# Patient Record
Sex: FEMALE | Race: WHITE | NOT HISPANIC OR LATINO | Employment: OTHER | URBAN - METROPOLITAN AREA
[De-identification: names, ages, dates, MRNs, and addresses within clinical notes are randomized per-mention and may not be internally consistent; named-entity substitution may affect disease eponyms.]

---

## 2017-01-09 ENCOUNTER — COMMUNICATION - HEALTHEAST (OUTPATIENT)
Dept: ADMINISTRATIVE | Facility: HOSPITAL | Age: 72
End: 2017-01-09

## 2017-01-18 ENCOUNTER — COMMUNICATION - HEALTHEAST (OUTPATIENT)
Dept: ADMINISTRATIVE | Facility: HOSPITAL | Age: 72
End: 2017-01-18

## 2017-01-24 ENCOUNTER — COMMUNICATION - HEALTHEAST (OUTPATIENT)
Dept: INTERNAL MEDICINE | Facility: CLINIC | Age: 72
End: 2017-01-24

## 2017-02-01 ENCOUNTER — COMMUNICATION - HEALTHEAST (OUTPATIENT)
Dept: INTERNAL MEDICINE | Facility: CLINIC | Age: 72
End: 2017-02-01

## 2017-02-03 ENCOUNTER — COMMUNICATION - HEALTHEAST (OUTPATIENT)
Dept: ADMINISTRATIVE | Facility: CLINIC | Age: 72
End: 2017-02-03

## 2017-02-03 ENCOUNTER — COMMUNICATION - HEALTHEAST (OUTPATIENT)
Dept: INTERNAL MEDICINE | Facility: CLINIC | Age: 72
End: 2017-02-03

## 2017-02-03 DIAGNOSIS — L50.9 HIVES: ICD-10-CM

## 2017-02-15 ENCOUNTER — OFFICE VISIT - HEALTHEAST (OUTPATIENT)
Dept: ALLERGY | Facility: CLINIC | Age: 72
End: 2017-02-15

## 2017-02-15 DIAGNOSIS — L50.9 URTICARIA: ICD-10-CM

## 2017-02-15 DIAGNOSIS — R09.81 NASAL CONGESTION: ICD-10-CM

## 2017-03-03 ENCOUNTER — RECORDS - HEALTHEAST (OUTPATIENT)
Dept: ADMINISTRATIVE | Facility: OTHER | Age: 72
End: 2017-03-03

## 2017-03-23 ENCOUNTER — COMMUNICATION - HEALTHEAST (OUTPATIENT)
Dept: ALLERGY | Facility: CLINIC | Age: 72
End: 2017-03-23

## 2017-04-06 ENCOUNTER — OFFICE VISIT - HEALTHEAST (OUTPATIENT)
Dept: INTERNAL MEDICINE | Facility: CLINIC | Age: 72
End: 2017-04-06

## 2017-04-06 DIAGNOSIS — H91.90 HEARING LOSS, UNSPECIFIED LATERALITY: ICD-10-CM

## 2017-04-06 DIAGNOSIS — E78.2 MIXED HYPERLIPIDEMIA: ICD-10-CM

## 2017-04-06 DIAGNOSIS — C50.411 MALIGNANT NEOPLASM OF UPPER-OUTER QUADRANT OF RIGHT FEMALE BREAST (H): ICD-10-CM

## 2017-04-06 DIAGNOSIS — Z78.0 MENOPAUSE: ICD-10-CM

## 2017-04-06 DIAGNOSIS — Z00.01 ENCOUNTER FOR GENERAL ADULT MEDICAL EXAMINATION WITH ABNORMAL FINDINGS: ICD-10-CM

## 2017-04-06 DIAGNOSIS — E55.9 VITAMIN D DEFICIENCY: ICD-10-CM

## 2017-04-06 LAB
CHOLEST SERPL-MCNC: 299 MG/DL
FASTING STATUS PATIENT QL REPORTED: YES
HDLC SERPL-MCNC: 47 MG/DL
LDLC SERPL CALC-MCNC: 191 MG/DL
TRIGL SERPL-MCNC: 305 MG/DL

## 2017-04-06 ASSESSMENT — MIFFLIN-ST. JEOR: SCORE: 1606.62

## 2017-04-21 ENCOUNTER — COMMUNICATION - HEALTHEAST (OUTPATIENT)
Dept: INTERNAL MEDICINE | Facility: CLINIC | Age: 72
End: 2017-04-21

## 2017-04-27 ENCOUNTER — OFFICE VISIT - HEALTHEAST (OUTPATIENT)
Dept: AUDIOLOGY | Facility: CLINIC | Age: 72
End: 2017-04-27

## 2017-04-27 DIAGNOSIS — H90.3 SENSORINEURAL HEARING LOSS, ASYMMETRICAL: ICD-10-CM

## 2017-05-04 ENCOUNTER — RECORDS - HEALTHEAST (OUTPATIENT)
Dept: ADMINISTRATIVE | Facility: OTHER | Age: 72
End: 2017-05-04

## 2017-09-08 ENCOUNTER — HOSPITAL ENCOUNTER (OUTPATIENT)
Dept: MAMMOGRAPHY | Facility: CLINIC | Age: 72
Discharge: HOME | End: 2017-09-08
Attending: INTERNAL MEDICINE

## 2017-09-08 DIAGNOSIS — Z12.31 VISIT FOR SCREENING MAMMOGRAM: ICD-10-CM

## 2017-10-09 ENCOUNTER — HOSPITAL ENCOUNTER (OUTPATIENT)
Dept: CT IMAGING | Facility: CLINIC | Age: 72
Setting detail: RADIATION/ONCOLOGY SERIES
Discharge: STILL ADMITTED OR ADMINISTRATIVE | End: 2017-10-09
Attending: INTERNAL MEDICINE

## 2017-10-09 DIAGNOSIS — C50.411 MALIGNANT NEOPLASM OF UPPER-OUTER QUADRANT OF RIGHT FEMALE BREAST (H): ICD-10-CM

## 2017-10-09 DIAGNOSIS — Z15.09 GENETIC PREDISPOSITION TO CANCER: ICD-10-CM

## 2017-10-16 ENCOUNTER — AMBULATORY - HEALTHEAST (OUTPATIENT)
Dept: INFUSION THERAPY | Facility: HOSPITAL | Age: 72
End: 2017-10-16

## 2017-10-16 ENCOUNTER — OFFICE VISIT - HEALTHEAST (OUTPATIENT)
Dept: ONCOLOGY | Facility: HOSPITAL | Age: 72
End: 2017-10-16

## 2017-10-16 DIAGNOSIS — C50.411 MALIGNANT NEOPLASM OF UPPER-OUTER QUADRANT OF RIGHT BREAST IN FEMALE, ESTROGEN RECEPTOR NEGATIVE (H): ICD-10-CM

## 2017-10-16 DIAGNOSIS — Z17.1 MALIGNANT NEOPLASM OF UPPER-OUTER QUADRANT OF RIGHT BREAST IN FEMALE, ESTROGEN RECEPTOR NEGATIVE (H): ICD-10-CM

## 2017-10-16 DIAGNOSIS — Z15.09 GENETIC PREDISPOSITION TO CANCER: ICD-10-CM

## 2017-10-16 DIAGNOSIS — C50.411 MALIGNANT NEOPLASM OF UPPER-OUTER QUADRANT OF RIGHT FEMALE BREAST (H): ICD-10-CM

## 2017-10-16 DIAGNOSIS — Z15.01 BRCA2 POSITIVE: ICD-10-CM

## 2017-10-16 DIAGNOSIS — D12.5 BENIGN NEOPLASM OF SIGMOID COLON: ICD-10-CM

## 2017-10-16 DIAGNOSIS — Z15.09 BRCA2 POSITIVE: ICD-10-CM

## 2017-11-17 ENCOUNTER — TRANSFERRED RECORDS (OUTPATIENT)
Dept: HEALTH INFORMATION MANAGEMENT | Facility: CLINIC | Age: 72
End: 2017-11-17

## 2017-11-17 ENCOUNTER — RECORDS - HEALTHEAST (OUTPATIENT)
Dept: ADMINISTRATIVE | Facility: OTHER | Age: 72
End: 2017-11-17

## 2017-12-13 ENCOUNTER — COMMUNICATION - HEALTHEAST (OUTPATIENT)
Dept: INTERNAL MEDICINE | Facility: CLINIC | Age: 72
End: 2017-12-13

## 2018-01-25 ENCOUNTER — RECORDS - HEALTHEAST (OUTPATIENT)
Dept: ADMINISTRATIVE | Facility: OTHER | Age: 73
End: 2018-01-25

## 2018-03-01 ENCOUNTER — OFFICE VISIT - HEALTHEAST (OUTPATIENT)
Dept: INTERNAL MEDICINE | Facility: CLINIC | Age: 73
End: 2018-03-01

## 2018-03-01 ENCOUNTER — COMMUNICATION - HEALTHEAST (OUTPATIENT)
Dept: INTERNAL MEDICINE | Facility: CLINIC | Age: 73
End: 2018-03-01

## 2018-03-01 DIAGNOSIS — E55.9 VITAMIN D DEFICIENCY: ICD-10-CM

## 2018-03-01 DIAGNOSIS — Z00.01 ENCOUNTER FOR GENERAL ADULT MEDICAL EXAMINATION WITH ABNORMAL FINDINGS: ICD-10-CM

## 2018-03-01 DIAGNOSIS — C50.411 MALIGNANT NEOPLASM OF UPPER-OUTER QUADRANT OF RIGHT BREAST IN FEMALE, ESTROGEN RECEPTOR NEGATIVE (H): ICD-10-CM

## 2018-03-01 DIAGNOSIS — R06.00 DYSPNEA, UNSPECIFIED TYPE: ICD-10-CM

## 2018-03-01 DIAGNOSIS — Z17.1 MALIGNANT NEOPLASM OF UPPER-OUTER QUADRANT OF RIGHT BREAST IN FEMALE, ESTROGEN RECEPTOR NEGATIVE (H): ICD-10-CM

## 2018-03-01 DIAGNOSIS — E78.2 MIXED HYPERLIPIDEMIA: ICD-10-CM

## 2018-03-01 DIAGNOSIS — Z78.0 MENOPAUSE: ICD-10-CM

## 2018-03-01 LAB
ALBUMIN SERPL-MCNC: 3.9 G/DL (ref 3.5–5)
ALBUMIN UR-MCNC: ABNORMAL MG/DL
ALP SERPL-CCNC: 100 U/L (ref 45–120)
ALT SERPL W P-5'-P-CCNC: 43 U/L (ref 0–45)
ANION GAP SERPL CALCULATED.3IONS-SCNC: 10 MMOL/L (ref 5–18)
APPEARANCE UR: CLEAR
AST SERPL W P-5'-P-CCNC: 28 U/L (ref 0–40)
BILIRUB DIRECT SERPL-MCNC: 0.1 MG/DL
BILIRUB SERPL-MCNC: 0.4 MG/DL (ref 0–1)
BILIRUB UR QL STRIP: NEGATIVE
BUN SERPL-MCNC: 16 MG/DL (ref 8–28)
CALCIUM SERPL-MCNC: 9.5 MG/DL (ref 8.5–10.5)
CHLORIDE BLD-SCNC: 102 MMOL/L (ref 98–107)
CHOLEST SERPL-MCNC: 250 MG/DL
CO2 SERPL-SCNC: 26 MMOL/L (ref 22–31)
COLOR UR AUTO: YELLOW
CREAT SERPL-MCNC: 0.65 MG/DL (ref 0.6–1.1)
ERYTHROCYTE [DISTWIDTH] IN BLOOD BY AUTOMATED COUNT: 11.3 % (ref 11–14.5)
FASTING STATUS PATIENT QL REPORTED: YES
GFR SERPL CREATININE-BSD FRML MDRD: >60 ML/MIN/1.73M2
GLUCOSE BLD-MCNC: 90 MG/DL (ref 70–125)
GLUCOSE UR STRIP-MCNC: NEGATIVE MG/DL
HCT VFR BLD AUTO: 45.9 % (ref 35–47)
HDLC SERPL-MCNC: 51 MG/DL
HGB BLD-MCNC: 15.5 G/DL (ref 12–16)
HGB UR QL STRIP: ABNORMAL
KETONES UR STRIP-MCNC: NEGATIVE MG/DL
LDLC SERPL CALC-MCNC: 160 MG/DL
LEUKOCYTE ESTERASE UR QL STRIP: NEGATIVE
MCH RBC QN AUTO: 30.6 PG (ref 27–34)
MCHC RBC AUTO-ENTMCNC: 33.8 G/DL (ref 32–36)
MCV RBC AUTO: 90 FL (ref 80–100)
NITRATE UR QL: NEGATIVE
PH UR STRIP: 5.5 [PH] (ref 5–8)
PLATELET # BLD AUTO: 243 THOU/UL (ref 140–440)
PMV BLD AUTO: 7.4 FL (ref 7–10)
POTASSIUM BLD-SCNC: 5 MMOL/L (ref 3.5–5)
PROT SERPL-MCNC: 7.3 G/DL (ref 6–8)
RBC # BLD AUTO: 5.08 MILL/UL (ref 3.8–5.4)
SODIUM SERPL-SCNC: 138 MMOL/L (ref 136–145)
SP GR UR STRIP: 1.02 (ref 1–1.03)
TRIGL SERPL-MCNC: 197 MG/DL
TSH SERPL DL<=0.005 MIU/L-ACNC: 4.13 UIU/ML (ref 0.3–5)
UROBILINOGEN UR STRIP-ACNC: ABNORMAL
WBC: 5.9 THOU/UL (ref 4–11)

## 2018-03-01 ASSESSMENT — MIFFLIN-ST. JEOR: SCORE: 1654.81

## 2018-03-02 LAB — 25(OH)D3 SERPL-MCNC: 38.2 NG/ML (ref 30–80)

## 2018-04-09 ENCOUNTER — OFFICE VISIT (OUTPATIENT)
Dept: PODIATRY | Facility: CLINIC | Age: 73
End: 2018-04-09
Payer: COMMERCIAL

## 2018-04-09 VITALS
SYSTOLIC BLOOD PRESSURE: 106 MMHG | DIASTOLIC BLOOD PRESSURE: 66 MMHG | HEIGHT: 66 IN | BODY MASS INDEX: 40.18 KG/M2 | WEIGHT: 250 LBS

## 2018-04-09 DIAGNOSIS — R60.0 LOWER EXTREMITY EDEMA: Primary | ICD-10-CM

## 2018-04-09 DIAGNOSIS — B35.1 ONYCHOMYCOSIS: ICD-10-CM

## 2018-04-09 PROCEDURE — 99203 OFFICE O/P NEW LOW 30 MIN: CPT | Performed by: PODIATRIST

## 2018-04-09 RX ORDER — ACETAMINOPHEN 160 MG
2000 TABLET,DISINTEGRATING ORAL
COMMUNITY

## 2018-04-09 RX ORDER — MULTIVITAMIN WITH IRON
1 TABLET ORAL DAILY
COMMUNITY

## 2018-04-09 RX ORDER — ACETAMINOPHEN 325 MG/1
325-650 TABLET ORAL PRN
COMMUNITY

## 2018-04-09 RX ORDER — FLUTICASONE PROPIONATE 50 MCG
2 SPRAY, SUSPENSION (ML) NASAL
COMMUNITY
Start: 2018-03-01 | End: 2024-04-08

## 2018-04-09 RX ORDER — ROSUVASTATIN CALCIUM 10 MG/1
20 TABLET, COATED ORAL DAILY
COMMUNITY
Start: 2018-03-01

## 2018-04-09 RX ORDER — DIAZEPAM 5 MG
5-10 TABLET ORAL
COMMUNITY
Start: 2018-03-01 | End: 2024-04-08

## 2018-04-09 NOTE — MR AVS SNAPSHOT
"              After Visit Summary   2018    Aida Alberts    MRN: [id number]           Patient Information     Date Of Birth          1945        Visit Information        Provider Department      2018 11:00 AM Sohail Ayala DPM [facility]        Today's Diagnoses     Lower extremity edema    -  1    Onychomycosis           Follow-ups after your visit        Follow-up notes from your care team     Return if symptoms worsen or fail to improve.      Who to contact     If you have questions or need follow up information about today's clinic visit or your schedule please contact [facility] directly at [phone].  Normal or non-critical lab and imaging results will be communicated to you by MyChart, letter or phone within 4 business days after the clinic has received the results. If you do not hear from us within 7 days, please contact the clinic through MyChart or phone. If you have a critical or abnormal lab result, we will notify you by phone as soon as possible.  Submit refill requests through [address] or call your pharmacy and they will forward the refill request to us. Please allow 3 business days for your refill to be completed.          Additional Information About Your Visit        MyChart Information     [address] lets you send messages to your doctor, view your test results, renew your prescriptions, schedule appointments and more. To sign up, go to [email or web address] . Click on \"Log in\" on the left side of the screen, which will take you to the Welcome page. Then click on \"Sign up Now\" on the right side of the page.     You will be asked to enter the access code listed below, as well as some personal information. Please follow the directions to create your username and password.     Your access code is: [email or web address]  Expires: 2018  1:01 PM     Your access code will  in 90 days. If you need help or a new code, please call your [facility] or " "[phone].        Care EveryWhere ID     This is your Care EveryWhere ID. This could be used by other organizations to access your [facility] medical records  [id number]        Your Vitals Were     Height BMI (Body Mass Index)                5' 6\" (1.676 m) 40.35 kg/m2           Blood Pressure from Last 3 Encounters:   04/09/18 106/66    Weight from Last 3 Encounters:   04/09/18 250 lb (113.4 kg)              Today, you had the following     No orders found for display       Primary Care Provider Fax #    Physician No Ref-Primary [phone]       No address on file        Equal Access to Services     [facility]: Hadii aad adan hadoxanao Soesequiel, waaxda luqadaha, qaybta kaalmada aderobbyyada, lilia cole aderobby styles . So [facility] [phone].    ATENCIÓN: Si habla español, tiene a chavez disposición servicios gratuitos de asistencia lingüística. Llame al [phone].    We comply with applicable federal civil rights laws and [place] laws. We do not discriminate on the basis of race, color, national origin, age, disability, sex, sexual orientation, or gender identity.            Thank you!     Thank you for choosing [facility]  for your care. Our goal is always to provide you with excellent care. Hearing back from our patients is one way we can continue to improve our services. Please take a few minutes to complete the written survey that you may receive in the mail after your visit with us. Thank you!             Your Updated Medication List - Protect others around you: Learn how to safely use, store and throw away your medicines at www.disposemymeds.org.          This list is accurate as of 4/9/18  1:01 PM.  Always use your most recent med list.                   Brand Name Dispense Instructions for use Diagnosis    acetaminophen 325 MG tablet    TYLENOL     Take 325-650 mg by mouth        diazepam 5 MG tablet    VALIUM     Take 5-10 mg by mouth        fluticasone 50 MCG/ACT spray    " FLONASE     Spray 2 sprays in nostril        magnesium 250 MG tablet      Take 1 tablet by mouth        rosuvastatin 10 MG tablet    CRESTOR     Take 10 mg by mouth        vitamin D3 2000 UNITS Caps      Take 2,000 Units by mouth

## 2018-04-09 NOTE — PROGRESS NOTES
"Foot & Ankle Surgery  April 9, 2018    CC: \"bl ankle swelling\", \"nail discoloration\"    I was asked to see Aida Alberts regarding the chief complaint by:  self    HPI:  Pt is a 72 year old female who presents with above complaint.  Bilateral issues x months.  \"no pain\".  She noticed the swelling when she was on a recent [place] vacation.  However, she has had issues long-term with bilateral lower extremity edema, left worse than right as she previously sustained a left tibial fracture.  She has compression stockings but has not used them yet.  She was also told while getting a pedicure that her nails were discolored, wondering what the issue is.     ROS:   Pos for CC.  The patient denies current nausea, vomiting, chills, fevers, belly pain, calf pain, chest pain or SOB.  Complete remainder of ROS is otherwise neg.    VITALS:    Vitals:    04/09/18 1116   BP: 106/66   Weight: 250 lb (113.4 kg)   Height: 5' 6\" (1.676 m)       PMH:  No past medical history on file.    SXHX:  No past surgical history on file.     MEDS:    Current Outpatient Prescriptions   Medication     rosuvastatin (CRESTOR) 10 MG tablet     magnesium 250 MG tablet     fluticasone (FLONASE) 50 MCG/ACT spray     diazepam (VALIUM) 5 MG tablet     Cholecalciferol (VITAMIN D3) 2000 UNITS CAPS     acetaminophen (TYLENOL) 325 MG tablet     No current facility-administered medications for this visit.        ALL:     Allergies   Allergen Reactions     Amoxicillin-Pot Clavulanate Nausea and Vomiting       FMH:  No family history on file.    SocHx:    Social History     Social History     Marital status:      Spouse name: N/A     Number of children: N/A     Years of education: N/A     Occupational History     Not on file.     Social History Main Topics     Smoking status: Former Smoker     Smokeless tobacco: Never Used     Alcohol use Not on file     Drug use: Not on file     Sexual activity: Not on file     Other Topics Concern     Not on file "     Social History Narrative     No narrative on file           EXAMINATION:  Gen:   No apparent distress  Neuro:   A&Ox3, no deficits  Psych:    Answering questions appropriately for age and situation with normal affect  Head:    NCAT  Eye:    Visual scanning without deficit  Ear:    Response to auditory stimuli wnl  Lung:    Non-labored breathing on RA noted  Abd:    NTND per patient report  Lymph:    Pitting edema bilateral lower extremity   Vasc:    Pulses palpable, CFT minimally delayed  Neuro:    Light touch sensation intact to all sensory nerve distributions without paresthesias  Derm:    Nail polish on all nails but I can visualize the underside of the R hallux nail, and it appears mycotic, dystrophic, thickened, discolored without skin pigment change.  Peeling skin medial R lower leg without venous pigment changes  MSK:    ROM, strength wnl without limitation, no pain on palpation noted.  Calf:    Neg for redness or tenderness    Assessment:  72 year old female with onychomycosis, mt R hallux; bilateral lower extremity pitting edema      Plan:  Discussed etiologies, anatomy and options  1.  Onychomycosis   -discussed cultures would be needed for definitive diagnosis  -discussed topical medication treatment plan, and less-than-ideal results  -patient declined treatment at this time    2.  Bilateral lower extremity edema  -discussed risk of blood clot being conributing facture, mt after recent trip to [place].  Patient declined scan, which is reasonable, as she has had baseline bilateral lower extremity edema in the past.  We also discussed humidity and/or salty diet can contribute to acute edema in the lower extremity  -advised compression stockings and elevation 30m TID for swelling control     Follow up:  prn or sooner with acute issues      Patient's medical history was reviewed today    Body mass index is 40.35 kg/(m^2).  Weight management plan: Patient was referred to their PCP to discuss a diet and  exercise plan.        Sohail Ayala, ARSALAN   Podiatric Foot & Ankle Surgeon  [facility]  [phone]

## 2018-04-19 ENCOUNTER — RECORDS - HEALTHEAST (OUTPATIENT)
Dept: ADMINISTRATIVE | Facility: OTHER | Age: 73
End: 2018-04-19

## 2018-04-26 ENCOUNTER — COMMUNICATION - HEALTHEAST (OUTPATIENT)
Dept: INTERNAL MEDICINE | Facility: CLINIC | Age: 73
End: 2018-04-26

## 2018-04-27 ENCOUNTER — COMMUNICATION - HEALTHEAST (OUTPATIENT)
Dept: ONCOLOGY | Facility: HOSPITAL | Age: 73
End: 2018-04-27

## 2018-04-30 ENCOUNTER — OFFICE VISIT - HEALTHEAST (OUTPATIENT)
Dept: ONCOLOGY | Facility: HOSPITAL | Age: 73
End: 2018-04-30

## 2018-04-30 DIAGNOSIS — C50.411 MALIGNANT NEOPLASM OF UPPER-OUTER QUADRANT OF RIGHT BREAST IN FEMALE, ESTROGEN RECEPTOR NEGATIVE (H): ICD-10-CM

## 2018-04-30 DIAGNOSIS — Z17.1 MALIGNANT NEOPLASM OF UPPER-OUTER QUADRANT OF RIGHT BREAST IN FEMALE, ESTROGEN RECEPTOR NEGATIVE (H): ICD-10-CM

## 2018-05-03 ENCOUNTER — COMMUNICATION - HEALTHEAST (OUTPATIENT)
Dept: ONCOLOGY | Facility: HOSPITAL | Age: 73
End: 2018-05-03

## 2018-05-09 ENCOUNTER — COMMUNICATION - HEALTHEAST (OUTPATIENT)
Dept: INTERNAL MEDICINE | Facility: CLINIC | Age: 73
End: 2018-05-09

## 2018-05-11 ENCOUNTER — RECORDS - HEALTHEAST (OUTPATIENT)
Dept: ADMINISTRATIVE | Facility: OTHER | Age: 73
End: 2018-05-11

## 2018-05-15 ENCOUNTER — COMMUNICATION - HEALTHEAST (OUTPATIENT)
Dept: ONCOLOGY | Facility: HOSPITAL | Age: 73
End: 2018-05-15

## 2018-05-16 ENCOUNTER — OFFICE VISIT - HEALTHEAST (OUTPATIENT)
Dept: ONCOLOGY | Facility: HOSPITAL | Age: 73
End: 2018-05-16

## 2018-05-16 DIAGNOSIS — Z15.01 BRCA2 POSITIVE: ICD-10-CM

## 2018-05-16 DIAGNOSIS — Z15.09 BRCA2 POSITIVE: ICD-10-CM

## 2018-05-16 DIAGNOSIS — C50.411 MALIGNANT NEOPLASM OF UPPER-OUTER QUADRANT OF RIGHT FEMALE BREAST (H): ICD-10-CM

## 2018-05-18 ENCOUNTER — RECORDS - HEALTHEAST (OUTPATIENT)
Dept: ADMINISTRATIVE | Facility: OTHER | Age: 73
End: 2018-05-18

## 2018-06-02 ENCOUNTER — OFFICE VISIT - HEALTHEAST (OUTPATIENT)
Dept: FAMILY MEDICINE | Facility: CLINIC | Age: 73
End: 2018-06-02

## 2018-06-02 DIAGNOSIS — R09.89 SINUS COMPLAINT: ICD-10-CM

## 2018-06-08 ENCOUNTER — OFFICE VISIT - HEALTHEAST (OUTPATIENT)
Dept: INTERNAL MEDICINE | Facility: CLINIC | Age: 73
End: 2018-06-08

## 2018-06-08 DIAGNOSIS — Z78.0 MENOPAUSE: ICD-10-CM

## 2018-06-08 DIAGNOSIS — Z01.818 PREOP EXAM FOR INTERNAL MEDICINE: ICD-10-CM

## 2018-06-08 LAB
ANION GAP SERPL CALCULATED.3IONS-SCNC: 12 MMOL/L (ref 5–18)
BUN SERPL-MCNC: 16 MG/DL (ref 8–28)
CALCIUM SERPL-MCNC: 9.7 MG/DL (ref 8.5–10.5)
CHLORIDE BLD-SCNC: 101 MMOL/L (ref 98–107)
CO2 SERPL-SCNC: 27 MMOL/L (ref 22–31)
CREAT SERPL-MCNC: 0.64 MG/DL (ref 0.6–1.1)
ERYTHROCYTE [DISTWIDTH] IN BLOOD BY AUTOMATED COUNT: 11.5 % (ref 11–14.5)
GFR SERPL CREATININE-BSD FRML MDRD: >60 ML/MIN/1.73M2
GLUCOSE BLD-MCNC: 74 MG/DL (ref 70–125)
HCT VFR BLD AUTO: 44.6 % (ref 35–47)
HGB BLD-MCNC: 15.3 G/DL (ref 12–16)
MCH RBC QN AUTO: 31.2 PG (ref 27–34)
MCHC RBC AUTO-ENTMCNC: 34.2 G/DL (ref 32–36)
MCV RBC AUTO: 91 FL (ref 80–100)
PLATELET # BLD AUTO: 261 THOU/UL (ref 140–440)
PMV BLD AUTO: 7.2 FL (ref 7–10)
POTASSIUM BLD-SCNC: 4.9 MMOL/L (ref 3.5–5)
RBC # BLD AUTO: 4.89 MILL/UL (ref 3.8–5.4)
SODIUM SERPL-SCNC: 140 MMOL/L (ref 136–145)
WBC: 10.1 THOU/UL (ref 4–11)

## 2018-06-08 ASSESSMENT — MIFFLIN-ST. JEOR: SCORE: 1650.28

## 2018-08-30 ENCOUNTER — COMMUNICATION - HEALTHEAST (OUTPATIENT)
Dept: INTERNAL MEDICINE | Facility: CLINIC | Age: 73
End: 2018-08-30

## 2018-09-07 ENCOUNTER — RECORDS - HEALTHEAST (OUTPATIENT)
Dept: ADMINISTRATIVE | Facility: OTHER | Age: 73
End: 2018-09-07

## 2019-04-15 ENCOUNTER — TRANSFERRED RECORDS (OUTPATIENT)
Dept: HEALTH INFORMATION MANAGEMENT | Facility: CLINIC | Age: 74
End: 2019-04-15
Payer: COMMERCIAL

## 2019-04-19 ENCOUNTER — COMMUNICATION - HEALTHEAST (OUTPATIENT)
Dept: INTERNAL MEDICINE | Facility: CLINIC | Age: 74
End: 2019-04-19

## 2019-07-22 ENCOUNTER — RECORDS - HEALTHEAST (OUTPATIENT)
Dept: ADMINISTRATIVE | Facility: OTHER | Age: 74
End: 2019-07-22

## 2019-07-23 ENCOUNTER — RECORDS - HEALTHEAST (OUTPATIENT)
Dept: ADMINISTRATIVE | Facility: OTHER | Age: 74
End: 2019-07-23

## 2019-08-13 ENCOUNTER — AMBULATORY - HEALTHEAST (OUTPATIENT)
Dept: FAMILY MEDICINE | Facility: CLINIC | Age: 74
End: 2019-08-13

## 2019-08-13 ENCOUNTER — COMMUNICATION - HEALTHEAST (OUTPATIENT)
Dept: ONCOLOGY | Facility: HOSPITAL | Age: 74
End: 2019-08-13

## 2019-08-13 DIAGNOSIS — C50.411 MALIGNANT NEOPLASM OF UPPER-OUTER QUADRANT OF RIGHT BREAST IN FEMALE, ESTROGEN RECEPTOR NEGATIVE (H): ICD-10-CM

## 2019-08-13 DIAGNOSIS — Z15.09 GENETIC PREDISPOSITION TO CANCER: ICD-10-CM

## 2019-08-13 DIAGNOSIS — Z87.891 STOPPED SMOKING WITH GREATER THAN 20 PACK YEAR HISTORY: ICD-10-CM

## 2019-08-13 DIAGNOSIS — Z17.1 MALIGNANT NEOPLASM OF UPPER-OUTER QUADRANT OF RIGHT BREAST IN FEMALE, ESTROGEN RECEPTOR NEGATIVE (H): ICD-10-CM

## 2019-08-29 ENCOUNTER — HOSPITAL ENCOUNTER (OUTPATIENT)
Dept: CT IMAGING | Facility: CLINIC | Age: 74
Discharge: HOME | End: 2019-08-29
Attending: INTERNAL MEDICINE

## 2019-08-29 DIAGNOSIS — C50.411 MALIGNANT NEOPLASM OF UPPER-OUTER QUADRANT OF RIGHT BREAST IN FEMALE, ESTROGEN RECEPTOR NEGATIVE (H): ICD-10-CM

## 2019-08-29 DIAGNOSIS — Z87.891 STOPPED SMOKING WITH GREATER THAN 20 PACK YEAR HISTORY: ICD-10-CM

## 2019-08-29 DIAGNOSIS — Z17.1 MALIGNANT NEOPLASM OF UPPER-OUTER QUADRANT OF RIGHT BREAST IN FEMALE, ESTROGEN RECEPTOR NEGATIVE (H): ICD-10-CM

## 2019-08-29 DIAGNOSIS — Z15.09 GENETIC PREDISPOSITION TO CANCER: ICD-10-CM

## 2019-10-07 ENCOUNTER — HOSPITAL ENCOUNTER (OUTPATIENT)
Dept: MAMMOGRAPHY | Facility: CLINIC | Age: 74
Discharge: HOME | End: 2019-10-07
Attending: INTERNAL MEDICINE

## 2019-10-07 DIAGNOSIS — Z12.31 SCREENING MAMMOGRAM, ENCOUNTER FOR: ICD-10-CM

## 2019-10-08 ENCOUNTER — RECORDS - HEALTHEAST (OUTPATIENT)
Dept: ADMINISTRATIVE | Facility: OTHER | Age: 74
End: 2019-10-08

## 2019-10-08 ENCOUNTER — RECORDS - HEALTHEAST (OUTPATIENT)
Dept: RADIOLOGY | Facility: CLINIC | Age: 74
End: 2019-10-08

## 2019-10-10 ENCOUNTER — AMBULATORY - HEALTHEAST (OUTPATIENT)
Dept: ONCOLOGY | Facility: HOSPITAL | Age: 74
End: 2019-10-10

## 2019-10-10 ENCOUNTER — OFFICE VISIT - HEALTHEAST (OUTPATIENT)
Dept: ONCOLOGY | Facility: HOSPITAL | Age: 74
End: 2019-10-10

## 2019-10-10 DIAGNOSIS — C50.411 MALIGNANT NEOPLASM OF UPPER-OUTER QUADRANT OF RIGHT BREAST IN FEMALE, ESTROGEN RECEPTOR NEGATIVE (H): ICD-10-CM

## 2019-10-10 DIAGNOSIS — Z17.1 MALIGNANT NEOPLASM OF UPPER-OUTER QUADRANT OF RIGHT BREAST IN FEMALE, ESTROGEN RECEPTOR NEGATIVE (H): ICD-10-CM

## 2019-10-10 DIAGNOSIS — Z12.31 ENCOUNTER FOR SCREENING MAMMOGRAM FOR MALIGNANT NEOPLASM OF BREAST: ICD-10-CM

## 2019-10-11 ENCOUNTER — AMBULATORY - HEALTHEAST (OUTPATIENT)
Dept: OTHER | Facility: CLINIC | Age: 74
End: 2019-10-11

## 2019-10-11 DIAGNOSIS — Z17.1 ESTROGEN RECEPTOR NEGATIVE STATUS (ER-): ICD-10-CM

## 2019-10-11 DIAGNOSIS — C50.411 MALIGNANT NEOPLASM OF UPPER-OUTER QUADRANT OF RIGHT FEMALE BREAST (H): ICD-10-CM

## 2019-10-17 NOTE — TELEPHONE ENCOUNTER
ONCOLOGY INTAKE: Records Information      APPT INFORMATION:  Referring provider:  Va Marinelli  Referring provider s clinic:  [facility]   Reason for visit/diagnosis:  BRCA2+, personal history of breast cancer, family history of ovarian cancer  Has patient been notified of appointment date and time?: yes    RECORDS INFORMATION:  Were the records received with the referral (via [employer])? yes    Has patient been seen for any external appt for this diagnosis? yes    If yes, where? [facility]     Has patient had any imaging or procedures outside of [place] for this condition? yes      If Yes, where? [facility] - labs for genetics and imaging for breast cancer    ADDITIONAL INFORMATION:  Referral sent to [facility]

## 2019-10-18 NOTE — TELEPHONE ENCOUNTER
RECORDS STATUS - BREAST    RECORDS REQUESTED FROM: Epic/   DATE REQUESTED: 11/15/2019    NOTES DETAILS STATUS   OFFICE NOTE from referring provider Shanita Marinelli   OFFICE NOTE from medical oncologist [facility]   OFFICE NOTE from surgeon N/A Robotic Assisted Hysterectomy 2/23/2016    OFFICE NOTE from radiation oncologist     OPERATIVE REPORT     LABS     PATHOLOGY REPORTS  (Tissue diagnosis, Stage, ER/CA percentage positive and intensity of staining, HER2 IHC, FISH, and all biopsies from breast and any distant metastasis)                 Complete Bx slides Case [id number] [facility] 2/24/2016 Report in CE    Bx Slides [facility] Breast path Report in CE     GENONOMIC TESTING     TYPE:   (Next Generation Sequencing, including Foundation One testing, and Oncotype score)     IMAGING (NEED IMAGES & REPORT)     CT SCANS     MRI Complete PACS   MAMMO Complete  [facility]    ULTRASOUND Complete PACS   PET     BONE SCAN     BRAIN MRI       Action    Action Taken 10/18/2019 3pm     I faxed MR (Genetic results/notes from [facility]) to HIM    I called [facility] to have some IMG pushed to PACS  IMG dept Ph: [phone]

## 2019-10-30 ENCOUNTER — AMBULATORY - HEALTHEAST (OUTPATIENT)
Dept: OTHER | Facility: CLINIC | Age: 74
End: 2019-10-30

## 2019-10-31 ENCOUNTER — HEALTH MAINTENANCE LETTER (OUTPATIENT)
Age: 74
End: 2019-10-31

## 2019-11-13 ENCOUNTER — AMBULATORY - HEALTHEAST (OUTPATIENT)
Dept: OTHER | Facility: CLINIC | Age: 74
End: 2019-11-13

## 2019-11-15 ENCOUNTER — PRE VISIT (OUTPATIENT)
Dept: ONCOLOGY | Facility: CLINIC | Age: 74
End: 2019-11-15

## 2019-12-02 ENCOUNTER — AMBULATORY - HEALTHEAST (OUTPATIENT)
Dept: OTHER | Facility: CLINIC | Age: 74
End: 2019-12-02

## 2019-12-16 ENCOUNTER — HEALTH MAINTENANCE LETTER (OUTPATIENT)
Age: 74
End: 2019-12-16

## 2020-01-02 NOTE — PROGRESS NOTES
Oncology Risk Management Consultation:  Date on this visit: 1/3/2020    Aida Alberts  is referred by Laz Hernández MD for an oncology risk management consultation. She requires high risk screening and surveillance to reduce  her risk of cancer secondary to having a deleterious BRCA2 mutation and is considered to be at high risk for hereditary breast and ovarian cancer.    Primary Physician: Laz Hernández MD    History Of Present Illness:  Ms. Alberts is a very pleasant, healthy 74 year old female who presents with family history of breast and ovarian cancer and a personal diagnosis of a BRCA2 mutation.     Genetic Testin2015 - Positive for a deleterious BRCA2+, deletions of exons 5-7, which is the same mutation identified in her sister who was diagnosed with stage IV ovarian cancer. The testing was done using a single site genetic test from [facility].     Pertinent history:  Menarche (age unknown)  Nulliparous.  Menopause in 40s.  Breast density: Heterogeneously dense.  History of triple negative breast cancer diagnosed in  located on the right side measuring 1.6 cm in size presenting as a mammogram otherwise completely asymptomatic for which she had lumpectomy and sentinel lymph node biopsy. [place] lymph node biopsy did show some positive cells on immunohistochemistry in her lymph node. Subsequent to that she underwent adjuvant chemotherapy with a dose dense [place] and cyclophosphamide for 4 cycles. Then she went on to have radiation therapy. Then at [facility] they discovered that the lymph node might have been positive so she was given adjuvant Taxol therapy as well.    2016 [facility] pathology showed benign endometrial polyp (2.7 cm), benign submucosal, intramural and subserosal leiomyomata (6; up to 0.9 cm), deep adenomyosis, endometriosis, uterine serosa, bilateral ovaries and fallopian tubes with no evidence of dysplasia or malignancy.  Mild chronic cervicitis.  No evidence  of cervical dysplasia.    History of OCPs: No  History of HRT: No    Pertinent screening history:  5/15/2000- Mammogram  5/18/2000 - bilateral breast ultrasound  6/5/2002- Mammogram -  6/20/2003- Mammogram  1/31/2013 - Breast imaging ([facility]?)  1/2/2015 - Screening mammogram, BiRads0 for 1.2 cm partially circumscribed mass in upper outer left breast at 2:00.   1/9/2015 - Left breast US, BiRads3, benign cyst and probable cluster of benign microcysts at 2:00 within zone 2-3  5/28/2015 - Left breast US, BiRads3, Cluster of probable benign microcysts 2:00 zone 3, located approximately 7 cm from the nipple, have decreased slightly in size.  6/25/2015 - Mammogram ([facility])  7/24/2015 - Breast MRI, BiRads2.  2/19/2016 - Mammogram ([facility])  2/19/2016 - Left breast US and Diagnostic tomosynthesis mammogram both, BiRads 0 for 2:00 position zone 2 demonstrates a hypoechoic mass 1.5 x 0.7 cm. This most likely represents a cyst. internal echoes within the mass.   2/22/2016 - Breast MRI, BiRads2, likely benign cyst in left breast.  8/22/2016 - [facility]  8/25/2016 - Screening mammogram, BiRads2  3/6/2017 -Breast MRI, BiRads2.  9/8/2017 - Screening mammogram, BiRads2  4/20/2018 - Breast MRI, BiRads4,  LEFT BREAST: Regional area of clumped retroareolar plane enhancement 4.2 x 1.2 cm for which MR-guided biopsy is recommended as this is indeterminate.  5/11/2018 - Left breast biopsy, Pathology reveals fibrocystic change.  10/7/2019 - Screening mammogram, BiRads2.        Patient reports history of multiple adenomas, no records available for:  5/8/2013 - Diagnostic colonoscopy  6/26/2015 - Colonoscopy  10/17/2016 - Colonoscopy  11/17/2017 - Colonoscopy    At this visit, she denies new fatigue, breast pain, asymmetry, lumps, masses, thickening, nipple discharge and skin changes in her breasts.  She denies urinary urgency and frequency, abdominal pain, bloating, constipation, unusual vaginal discharge and early satiety.    Past Medical/Surgical  History:  Past Medical History:   Diagnosis Date     BRCA2 positive 01/22/2015    deletion of exons 5-7, identified using single site testing through [email or web address]     Colonic adenoma     5-6 advanced adenomas in 2013 colonoscopy     Malignant neoplasm of upper-outer quadrant of right breast in female, estrogen receptor negative ([facility]) 2002    infiltrating ductal carcinoma w/DCIS, Stage IIA (T1c, N1, cM0, Free text: ER-ve,GA-ve,Hds4wmb -ve)     No past surgical history on file.    Allergies:  Allergies as of 01/03/2020 - Reviewed 04/09/2018   Allergen Reaction Noted     Amoxicillin-pot clavulanate Nausea and Vomiting 02/15/2016       Current Medications:  Current Outpatient Medications   Medication Sig Dispense Refill     acetaminophen (TYLENOL) 325 MG tablet Take 325-650 mg by mouth       Cholecalciferol (VITAMIN D3) 2000 UNITS CAPS Take 2,000 Units by mouth       diazepam (VALIUM) 5 MG tablet Take 5-10 mg by mouth       fluticasone (FLONASE) 50 MCG/ACT spray Spray 2 sprays in nostril       magnesium 250 MG tablet Take 1 tablet by mouth       rosuvastatin (CRESTOR) 10 MG tablet Take 10 mg by mouth          Family History:  Family History   Problem Relation Age of Onset     Hereditary Breast and Ovarian Cancer Syndrome Sister         BRCA2+, same mutation     Ovarian Cancer Sister 77        endometriod adenocarcinoma, grade2     Breast Cancer Niece 40     Melanoma Niece 29     Cancer Paternal Uncle         intestinal or renal cell        Social History:  Social History     Socioeconomic History     Marital status:      Spouse name: Not on file     Number of children: Not on file     Years of education: Not on file     Highest education level: Not on file   Occupational History     Not on file   Social Needs     Financial resource strain: Not on file     Food insecurity:     Worry: Not on file     Inability: Not on file     Transportation needs:     Medical: Not on file     Non-medical: Not on file   Tobacco  Use     Smoking status: Former Smoker     Smokeless tobacco: Never Used   Substance and Sexual Activity     Alcohol use: Not on file     Drug use: Not on file     Sexual activity: Not on file   Lifestyle     Physical activity:     Days per week: Not on file     Minutes per session: Not on file     Stress: Not on file   Relationships     Social connections:     Talks on phone: Not on file     Gets together: Not on file     Attends Church service: Not on file     Active member of club or organization: Not on file     Attends meetings of clubs or organizations: Not on file     Relationship status: Not on file     Intimate partner violence:     Fear of current or ex partner: Not on file     Emotionally abused: Not on file     Physically abused: Not on file     Forced sexual activity: Not on file   Other Topics Concern     Not on file   Social History Narrative     Not on file       Physical Exam:  There were no vitals taken for this visit.  Physical exam deferred.    Laboratory/Imaging Studies  No results found for any visits on 01/03/20.    ASSESSMENT  We discussed the differences between cancer risk for the general population and those with BRCA mutations. It is estimated that 10% of all women with breast cancer have BRCA mutations.  It is estimated that 15% of all women with ovarian cancer have BRCA mutations. We also discussed that inheriting a mutation does not mean that a person will develop cancer, but rather that they are at increased risk.     Women with BRCA2 mutations have an estimated 45-49% cumulative risk of breast cancer by age 70, compared to the general population risk of 12%.  The risk of developing contralateral breast cancer within BRCA2 carriers  is higher when the age at first diagnosis is less than age 40. It is estimated that in women whose breast cancer have a 34.6% risk for contralateral breast cancer within 10- years of their initial diagnosis (with no intervention.)    The risk for ovarian  cancer, including primary peritoneal and fallopian tube cancer, by age 70 is estimated to be between 11-18%. Male BRCA2+ carriers have a cumulative breast cancer risk of more than 6% by age 70. Both men and women have an increased risk of pancreatic cancer,  Melanoma, gall bladder, bile duct and stomach cancer compared to the general population, although the exact risks have not been well defined.     We reviewed the All Syndromes Known to Man calculator and looked at her remaining lifetime risk for contralateral breast cancer, which does not take into account her breast cancer genomic composition but only looks at her BRCA2+ status; her remaining risk for recurrence by this calculator is about 13.7% within the next 5 years and up to 33% to age 85. Her ovarian cancer risk may be about 2.3-5%.  We discussed that she should continue to have gynecological manual exams as long as she is sexually active, which she is not at this time, and that I would not recommend pelvic imaging or  tests for her for screening due to their lack of efficacy. I would recommend annual skin exams (already underway with Dr. Billingsley) and annual eye exams, already underway, as she is at heightened risk for melanoma. I would not recommend pancreatic screening for her at this time as there is no pancreatic cancer in her family; we discuss losing weight, and limiting alcohol as prevention strategies for pancreatic cancer.    We discussed signs and symptoms to be watchful for in between surveillance visits. She verbalized understanding of signs and symptoms to address with her health care providers  including vaginal bleeding, unresolved bloating, pain, tenderness, early satiety and lumps, thickening, swelling, tenderness, nipple discharge or changes in skin of breasts.    We also discussed following  a healthy lifestyle plan recommended by both NCCN and the American Cancer Society that can reduce the risk of cancer:  1 Limit alcohol  consumption to less than 1 drink per day (1 drink=5 oz.wine, 12 oz. Beer or 1.5 oz. 80-proof liquor). She may cut back on her alcohol intake but takes in about 1-2 drinks when she does drink.   2. Exercise per American Cancer Society guidelines of at least 150 minutes of moderate-intensity activity or 75 minutes of vigorous activity each week. (Or a combination of both.) Exercise should be spread  out over the week. She is planning to add a walking regimen with the [email or web address] program this year.   3. Maintain a healthy weight with a Body Mass Index between 19-24.9. She recently lost 26 pounds using [employer] and going to continue the program this year.  4. Do not use tobacco products and limit exposure to passive smoke. She does not smoke.     INDIVIDUALIZED CANCER RISK MANAGEMENT PLAN:  Individualized Surveillance Plan for women  Hereditary Breast and/or Ovarian Cancer Syndrome   Per NCCN Guidelines Version 3.2019   Recommended screening Test or procedure Last done Next Scheduled    Breast self awareness- starting at age 18. Women should be familiar with their breasts and promptly report changes to their care provider. Periodic, consistent self exam may facilitate breast self awareness. Premenopausal women may find self exams to be most informative when performed at the end of menses.   1/3//2019   October 2020   Breast screening, starting at age 25 Clinical breast exams every 6 -12 months Deferred October 2020   Breast screening   Age 25-29 Annual breast MRI screening with contrast (or mammogram if MRI is unavailable) or individualized based on family history if a breast cancer diagnosis before age 30 is present.       Breast MRI is performed preferably on day 7-15 of menstrual cycle for premenopausal women.     See below     See below   Breast screening   Age >30-75 years     Annual mammogram (consider tomosynthesis mammogram) and annual screening MRI.     Breast MRI is performed preferably on day 7-15  of menstrual cycle for premenopausal women.    Age>75 years, management should be considered on an individual basis. 10/7/2019- SCREENING MAMMOGRAM Findings: postsurgical lumpectomy changes in the right breast. No evidence for malignancy and no change from the previous exam(s). Continue routine screening mammogram as recommended. ACR BI-RADS Category 2: Benign Findings   Breast MRI in April    Exam  in October 2020 followed by tomosynthesis mammogram   Ovarian cancer screening, starting at age 30-35 Consider transvaginal ultrasound and  tests. NA- THBSO in 2016 for risk reduction    Recommendation: risk-reducing salpingo-oophorectomy(RRSO), typically between 35 and 40 years old and  upon completion of child bearing.     Because ovarian cancer onset in patients with BRCA2 mutations is on average of 8-10 years later than in patients with BRCA1 mutations, it is reasonable to delay RRSO until 40-45 years in patients with BRCA2 mutations  unless age at diagnosis in the family warrants earlier age for consideration for prophylactic surgery.    Limited data suggest that there may be a slightly increased risk of serous uterine cancer among women with BRCA1+ pathogenic variants. The provider and the patient should discuss the risks and benefits of a concurrent hysterectomy at the time of RRSO for women with a BRCA1+ pathogenic variant /like pathogenic variant prior to surgery.     Salpingectomy alone is not the standard of care for risk reduction although clinical trials are ongoing.     Discuss option of risk-reducing mastectomy.    Consider risks and benefits of risk reducing agents, such as tamoxifen and raloxifene for breast and ovarian cancer.    Consider a full body skin and eye exam for melanoma screening for both BRCA1+ and BRCA2+.     NOTE: Women with BRCA mutation who are treated for breast cancer should have screening of the remaining breast tissue with annual mammography and breast MRI.    The International  Cancer of the Pancreas Screening (CAPS) Consortium recommends that individuals with a BRCA2 mutation who have at least one first-degree relative with pancreatic cancer should undergo initial screening with endoscopic ultrasonography and/or MRI/magnetic resonance cholangiopancreatography.       I will be happy to work with her to manage her cancer risk, will follow up on her screenings and see her in the Cancer Risk Management clinic in October.    I spent 60  minutes with the patient with greater that 50% of it in counseling and coordinating care as documented above.    Estrella Her, APRN-CNS, OCN, AGN-BC  Clinical Nurse Specialist  Cancer Risk Management Program  [facility] [address]  [place] [address]    phone:  [phone]  Pager: [phone]  fax: [phone]    CC: MD Demetrice Sylvester MD Joshua Colton, MD

## 2020-01-03 ENCOUNTER — ONCOLOGY VISIT (OUTPATIENT)
Dept: ONCOLOGY | Facility: CLINIC | Age: 75
End: 2020-01-03
Attending: CLINICAL NURSE SPECIALIST
Payer: COMMERCIAL

## 2020-01-03 VITALS
TEMPERATURE: 97.7 F | RESPIRATION RATE: 16 BRPM | SYSTOLIC BLOOD PRESSURE: 116 MMHG | WEIGHT: 234.9 LBS | HEART RATE: 78 BPM | OXYGEN SATURATION: 98 % | BODY MASS INDEX: 39.14 KG/M2 | HEIGHT: 65 IN | DIASTOLIC BLOOD PRESSURE: 70 MMHG

## 2020-01-03 DIAGNOSIS — Z15.01 BRCA2 POSITIVE: Primary | ICD-10-CM

## 2020-01-03 DIAGNOSIS — Z15.09 BRCA2 POSITIVE: Primary | ICD-10-CM

## 2020-01-03 DIAGNOSIS — Z85.3 HISTORY OF MALIGNANT NEOPLASM OF BREAST: ICD-10-CM

## 2020-01-03 PROBLEM — M12.9 ARTHROPATHY: Status: ACTIVE | Noted: 2020-01-03

## 2020-01-03 PROBLEM — E78.5 HYPERLIPIDEMIA: Status: ACTIVE | Noted: 2020-01-03

## 2020-01-03 PROBLEM — Z17.1 MALIGNANT NEOPLASM OF UPPER-OUTER QUADRANT OF RIGHT BREAST IN FEMALE, ESTROGEN RECEPTOR NEGATIVE (H): Status: ACTIVE | Noted: 2020-01-03

## 2020-01-03 PROBLEM — M17.0 BILATERAL PRIMARY OSTEOARTHRITIS OF KNEE: Status: ACTIVE | Noted: 2019-04-08

## 2020-01-03 PROBLEM — Z86.0100 HISTORY OF COLON POLYPS: Status: ACTIVE | Noted: 2019-04-08

## 2020-01-03 PROBLEM — C50.411 MALIGNANT NEOPLASM OF UPPER-OUTER QUADRANT OF RIGHT BREAST IN FEMALE, ESTROGEN RECEPTOR NEGATIVE (H): Status: ACTIVE | Noted: 2020-01-03

## 2020-01-03 PROBLEM — D12.6 BENIGN NEOPLASM OF COLON: Status: ACTIVE | Noted: 2019-04-08

## 2020-01-03 PROCEDURE — 99215 OFFICE O/P EST HI 40 MIN: CPT | Mod: ZP | Performed by: CLINICAL NURSE SPECIALIST

## 2020-01-03 PROCEDURE — G0463 HOSPITAL OUTPT CLINIC VISIT: HCPCS | Mod: ZF

## 2020-01-03 ASSESSMENT — PAIN SCALES - GENERAL: PAINLEVEL: NO PAIN (0)

## 2020-01-03 ASSESSMENT — MIFFLIN-ST. JEOR: SCORE: 1568.87

## 2020-01-03 NOTE — LETTER
Cancer Risk Management  Program Locations    [facility]  [facility]  [facility]  [facility]  Mailing Address  Cancer Risk Management Program  [facility]  [address]  [place] [address]    New patient appointments  [phone]  January 3, 2020    Aida Alberts  [address] [facility] [address]  [facility] [address]      Dear Aida,    It was a pleasure meeting with you today.  Below is a copy of my note from our visit, outlining your surveillance plan.      I look forward to seeing you in the future to coordinate your care and reduce your health risks. Please feel free to contact me if you have any questions or concerns.      Oncology Risk Management Consultation:  Date on this visit: 1/3/2020    Aida Alberts  is referred by Laz Hernández MD for an oncology risk management consultation. She requires high risk screening and surveillance to reduce  her risk of cancer secondary to having a deleterious BRCA2 mutation and is considered to be at high risk for hereditary breast and ovarian cancer.    Primary Physician: Laz Hernández MD    History Of Present Illness:  Ms. Alberts is a very pleasant, healthy 74 year old female who presents with family history of breast and ovarian cancer and a personal diagnosis of a BRCA2 mutation.     Genetic Testin2015 - Positive for a deleterious BRCA2+, deletions of exons 5-7, which is the same mutation identified in her sister who was diagnosed with stage IV ovarian cancer. The testing was done using a single site genetic test from [employer].     Pertinent history:  Menarche (age unknown)  Nulliparous.  Menopause in 40s.  Breast density: Heterogeneously dense.  History of triple negative breast cancer diagnosed in  located on the right side measuring 1.6 cm in size presenting as  a mammogram otherwise completely asymptomatic for which she had lumpectomy and sentinel lymph node biopsy. [place] lymph node biopsy did show some positive cells on immunohistochemistry in her lymph node. Subsequent to that she underwent adjuvant chemotherapy with a dose dense [facility] and cyclophosphamide for 4 cycles. Then she went on to have radiation therapy. Then at [facility] they discovered that the lymph node might have been positive so she was given adjuvant Taxol therapy as well.    2/23/2016 [facility] pathology showed benign endometrial polyp (2.7 cm), benign submucosal, intramural and subserosal leiomyomata (6; up to 0.9 cm), deep adenomyosis, endometriosis, uterine serosa, bilateral ovaries and fallopian tubes with no evidence of dysplasia or malignancy.  Mild chronic cervicitis.  No evidence of cervical dysplasia.    History of OCPs: No  History of HRT: No    Pertinent screening history:  5/15/2000- Mammogram  5/18/2000 - bilateral breast ultrasound  6/5/2002- Mammogram -  6/20/2003- Mammogram  1/31/2013 - Breast imaging ([facility]?)  1/2/2015 - Screening mammogram, BiRads0 for 1.2 cm partially circumscribed mass in upper outer left breast at 2:00.   1/9/2015 - Left breast US, BiRads3, benign cyst and probable cluster of benign microcysts at 2:00 within zone 2-3  5/28/2015 - Left breast US, BiRads3, Cluster of probable benign microcysts 2:00 zone 3, located approximately 7 cm from the nipple, have decreased slightly in size.  6/25/2015 - Mammogram ([facility])  7/24/2015 - Breast MRI, BiRads2.  2/19/2016 - Mammogram ([facility])  2/19/2016 - Left breast US and Diagnostic tomosynthesis mammogram both, BiRads 0 for 2:00 position zone 2 demonstrates a hypoechoic mass 1.5 x 0.7 cm. This most likely represents a cyst. internal echoes within the mass.   2/22/2016 - Breast MRI, BiRads2, likely benign cyst in left breast.  8/22/2016 - [facility]  8/25/2016 - Screening mammogram, BiRads2  3/6/2017 -Breast MRI, BiRads2.  9/8/2017 - Screening  mammogram, BiRads2  4/20/2018 - Breast MRI, BiRads4,  LEFT BREAST: Regional area of clumped retroareolar plane enhancement 4.2 x 1.2 cm for which MR-guided biopsy is recommended as this is indeterminate.  5/11/2018 - Left breast biopsy, Pathology reveals fibrocystic change.  10/7/2019 - Screening mammogram, BiRads2.        Patient reports history of multiple adenomas, no records available for:  5/8/2013 - Diagnostic colonoscopy  6/26/2015 - Colonoscopy  10/17/2016 - Colonoscopy  11/17/2017 - Colonoscopy    At this visit, she denies new fatigue, breast pain, asymmetry, lumps, masses, thickening, nipple discharge and skin changes in her breasts.  She denies urinary urgency and frequency, abdominal pain, bloating, constipation, unusual vaginal discharge and early satiety.    Past Medical/Surgical History:  Past Medical History:   Diagnosis Date     BRCA2 positive 01/22/2015    deletion of exons 5-7, identified using single site testing through [facility]     Colonic adenoma     5-6 advanced adenomas in 2013 colonoscopy     Malignant neoplasm of upper-outer quadrant of right breast in female, estrogen receptor negative ([facility]) 2002    infiltrating ductal carcinoma w/DCIS, Stage IIA (T1c, N1, cM0, Free text: ER-ve,AK-ve,Eti6ali -ve)     No past surgical history on file.    Allergies:  Allergies as of 01/03/2020 - Reviewed 04/09/2018   Allergen Reaction Noted     Amoxicillin-pot clavulanate Nausea and Vomiting 02/15/2016       Current Medications:  Current Outpatient Medications   Medication Sig Dispense Refill     acetaminophen (TYLENOL) 325 MG tablet Take 325-650 mg by mouth       Cholecalciferol (VITAMIN D3) 2000 UNITS CAPS Take 2,000 Units by mouth       diazepam (VALIUM) 5 MG tablet Take 5-10 mg by mouth       fluticasone (FLONASE) 50 MCG/ACT spray Spray 2 sprays in nostril       magnesium 250 MG tablet Take 1 tablet by mouth       rosuvastatin (CRESTOR) 10 MG tablet Take 10 mg by mouth          Family  History:  Family History   Problem Relation Age of Onset     Hereditary Breast and Ovarian Cancer Syndrome Sister         BRCA2+, same mutation     Ovarian Cancer Sister 77        endometriod adenocarcinoma, grade2     Breast Cancer Niece 40     Melanoma Niece 29     Cancer Paternal Uncle         intestinal or renal cell        Social History:  Social History     Socioeconomic History     Marital status:      Spouse name: Not on file     Number of children: Not on file     Years of education: Not on file     Highest education level: Not on file   Occupational History     Not on file   Social Needs     Financial resource strain: Not on file     Food insecurity:     Worry: Not on file     Inability: Not on file     Transportation needs:     Medical: Not on file     Non-medical: Not on file   Tobacco Use     Smoking status: Former Smoker     Smokeless tobacco: Never Used   Substance and Sexual Activity     Alcohol use: Not on file     Drug use: Not on file     Sexual activity: Not on file   Lifestyle     Physical activity:     Days per week: Not on file     Minutes per session: Not on file     Stress: Not on file   Relationships     Social connections:     Talks on phone: Not on file     Gets together: Not on file     Attends Mu-ism service: Not on file     Active member of club or organization: Not on file     Attends meetings of clubs or organizations: Not on file     Relationship status: Not on file     Intimate partner violence:     Fear of current or ex partner: Not on file     Emotionally abused: Not on file     Physically abused: Not on file     Forced sexual activity: Not on file   Other Topics Concern     Not on file   Social History Narrative     Not on file       Physical Exam:  There were no vitals taken for this visit.  Physical exam deferred.    Laboratory/Imaging Studies  No results found for any visits on 01/03/20.    ASSESSMENT  We discussed the differences between cancer risk for the  general population and those with BRCA mutations. It is estimated that 10% of all women with breast cancer have BRCA mutations.  It is estimated that 15% of all women with ovarian cancer have BRCA mutations. We also discussed that inheriting a mutation does not mean that a person will develop cancer, but rather that they are at increased risk.     Women with BRCA2 mutations have an estimated 45-49% cumulative risk of breast cancer by age 70, compared to the general population risk of 12%.  The risk of developing contralateral breast cancer within BRCA2 carriers  is higher when the age at first diagnosis is less than age 40. It is estimated that in women whose breast cancer have a 34.6% risk for contralateral breast cancer within 10- years of their initial diagnosis (with no intervention.)    The risk for ovarian cancer, including primary peritoneal and fallopian tube cancer, by age 70 is estimated to be between 11-18%. Male BRCA2+ carriers have a cumulative breast cancer risk of more than 6% by age 70. Both men and women have an increased risk of pancreatic cancer,  Melanoma, gall bladder, bile duct and stomach cancer compared to the general population, although the exact risks have not been well defined.     We reviewed the All Syndromes Known to Man calculator and looked at her remaining lifetime risk for contralateral breast cancer, which does not take into account her breast cancer genomic composition but only looks at her BRCA2+ status; her remaining risk for recurrence by this calculator is about 13.7% within the next 5 years and up to 33% to age 85. Her ovarian cancer risk may be about 2.3-5%.  We discussed that she should continue to have gynecological manual exams as long as she is sexually active, which she is not at this time, and that I would not recommend pelvic imaging or  tests for her for screening due to their lack of efficacy. I would recommend annual skin exams (already underway with   [facility]) and annual eye exams, already underway, as she is at heightened risk for melanoma. I would not recommend pancreatic screening for her at this time as there is no pancreatic cancer in her family; we discuss losing weight, and limiting alcohol as prevention strategies for pancreatic cancer.    We discussed signs and symptoms to be watchful for in between surveillance visits. She verbalized understanding of signs and symptoms to address with her health care providers  including vaginal bleeding, unresolved bloating, pain, tenderness, early satiety and lumps, thickening, swelling, tenderness, nipple discharge or changes in skin of breasts.    We also discussed following  a healthy lifestyle plan recommended by both NCCN and the American Cancer Society that can reduce the risk of cancer:  1 Limit alcohol consumption to less than 1 drink per day (1 drink=5 oz.wine, 12 oz. Beer or 1.5 oz. 80-proof liquor). She may cut back on her alcohol intake but takes in about 1-2 drinks when she does drink.   2. Exercise per American Cancer Society guidelines of at least 150 minutes of moderate-intensity activity or 75 minutes of vigorous activity each week. (Or a combination of both.) Exercise should be spread  out over the week. She is planning to add a walking regimen with the [employer] program this year.   3. Maintain a healthy weight with a Body Mass Index between 19-24.9. She recently lost 26 pounds using [employer] and going to continue the program this year.  4. Do not use tobacco products and limit exposure to passive smoke. She does not smoke.     INDIVIDUALIZED CANCER RISK MANAGEMENT PLAN:  Individualized Surveillance Plan for women  Hereditary Breast and/or Ovarian Cancer Syndrome   Per NCCN Guidelines Version 3.2019   Recommended screening Test or procedure Last done Next Scheduled    Breast self awareness- starting at age 18. Women should be familiar with their breasts and promptly report changes to  their care provider. Periodic, consistent self exam may facilitate breast self awareness. Premenopausal women may find self exams to be most informative when performed at the end of menses.   1/3//2019   October 2020   Breast screening, starting at age 25 Clinical breast exams every 6 -12 months Deferred October 2020   Breast screening   Age 25-29 Annual breast MRI screening with contrast (or mammogram if MRI is unavailable) or individualized based on family history if a breast cancer diagnosis before age 30 is present.       Breast MRI is performed preferably on day 7-15 of menstrual cycle for premenopausal women.     See below     See below   Breast screening   Age >30-75 years     Annual mammogram (consider tomosynthesis mammogram) and annual screening MRI.     Breast MRI is performed preferably on day 7-15 of menstrual cycle for premenopausal women.    Age>75 years, management should be considered on an individual basis. 10/7/2019- SCREENING MAMMOGRAM Findings: postsurgical lumpectomy changes in the right breast. No evidence for malignancy and no change from the previous exam(s). Continue routine screening mammogram as recommended. ACR BI-RADS Category 2: Benign Findings   Breast MRI in April    Exam  in October 2020 followed by tomosynthesis mammogram   Ovarian cancer screening, starting at age 30-35 Consider transvaginal ultrasound and  tests. NA- [facility] in 2016 for risk reduction    Recommendation: risk-reducing salpingo-oophorectomy(RRSO), typically between 35 and 40 years old and  upon completion of child bearing.     Because ovarian cancer onset in patients with BRCA2 mutations is on average of 8-10 years later than in patients with BRCA1 mutations, it is reasonable to delay RRSO until 40-45 years in patients with BRCA2 mutations  unless age at diagnosis in the family warrants earlier age for consideration for prophylactic surgery.    Limited data suggest that there may be a slightly increased risk of  serous uterine cancer among women with BRCA1+ pathogenic variants. The provider and the patient should discuss the risks and benefits of a concurrent hysterectomy at the time of RRSO for women with a BRCA1+ pathogenic variant /like pathogenic variant prior to surgery.     Salpingectomy alone is not the standard of care for risk reduction although clinical trials are ongoing.     Discuss option of risk-reducing mastectomy.    Consider risks and benefits of risk reducing agents, such as tamoxifen and raloxifene for breast and ovarian cancer.    Consider a full body skin and eye exam for melanoma screening for both BRCA1+ and BRCA2+.     NOTE: Women with BRCA mutation who are treated for breast cancer should have screening of the remaining breast tissue with annual mammography and breast MRI.    The International Cancer of the Pancreas Screening (CAPS) Consortium recommends that individuals with a BRCA2 mutation who have at least one first-degree relative with pancreatic cancer should undergo initial screening with endoscopic ultrasonography and/or MRI/magnetic resonance cholangiopancreatography.       I will be happy to work with her to manage her cancer risk, will follow up on her screenings and see her in the Cancer Risk Management clinic in October.    I spent 60  minutes with the patient with greater that 50% of it in counseling and coordinating care as documented above.    Estrella Her, JACKSON-CNS, OCN, AGN-BC  Clinical Nurse Specialist  Cancer Risk Management Program  [facility] [address]  [place] [address]    phone:  [phone]  Pager: [phone]  fax: [phone]    CC: MD Demetrice Sylvester MD Joshua Colton, MD

## 2020-01-03 NOTE — NURSING NOTE
"Oncology Rooming Note    January 3, 2020 2:01 PM   Aida Alberts is a 74 year old female who presents for:    Chief Complaint   Patient presents with     Oncology Clinic Visit     New; Malignant neoplasm of upper-outer quadrant of right breast, estrogen receptor negative     Initial Vitals: Ht 1.655 m (5' 5.16\")   Wt 106.5 kg (234 lb 14.4 oz)   LMP  (LMP Unknown)   Breastfeeding No   BMI 38.90 kg/m   Estimated body mass index is 38.9 kg/m  as calculated from the following:    Height as of this encounter: 1.655 m (5' 5.16\").    Weight as of this encounter: 106.5 kg (234 lb 14.4 oz). Body surface area is 2.21 meters squared.  No Pain (0) Comment: Data Unavailable   No LMP recorded (lmp unknown). Patient is postmenopausal.  Allergies reviewed: Yes  Medications reviewed: Yes    Medications: Medication refills not needed today.  Pharmacy name entered into [employer]: [facility] [address] [place] [address] [id number] [facility]    Clinical concerns: No new concerns.       Leah York, ANGIE              "

## 2020-01-03 NOTE — PATIENT INSTRUCTIONS
Individualized Surveillance Plan for women  Hereditary Breast and/or Ovarian Cancer Syndrome   Per NCCN Guidelines Version 3.2019   Recommended screening Test or procedure Last done Next Scheduled    Breast self awareness- starting at age 18. Women should be familiar with their breasts and promptly report changes to their care provider. Periodic, consistent self exam may facilitate breast self awareness. Premenopausal women may find self exams to be most informative when performed at the end of menses.   1/3//2019   October 2020   Breast screening, starting at age 25 Clinical breast exams every 6 -12 months     Breast screening   Age 25-29 Annual breast MRI screening with contrast (or mammogram if MRI is unavailable) or individualized based on family history if a breast cancer diagnosis before age 30 is present.       Breast MRI is performed preferably on day 7-15 of menstrual cycle for premenopausal women.     See below     See below   Breast screening   Age >30-75 years     Annual mammogram (consider tomosynthesis mammogram) and annual screening MRI.     Breast MRI is performed preferably on day 7-15 of menstrual cycle for premenopausal women.    Age>75 years, management should be considered on an individual basis. 10/7/2019- SCREENING MAMMOGRAM Findings: postsurgical lumpectomy changes in the right breast. No evidence for malignancy and no change from the previous exam(s). Continue routine screening mammogram as recommended. ACR BI-RADS Category 2: Benign Findings   Breast MRI in April    Exam  in October 2020 followed by tomosynthesis mammogram   Ovarian cancer screening, starting at age 30-35 Consider transvaginal ultrasound and  tests. NA- [facility] in 2016 for risk reduction    Recommendation: risk-reducing salpingo-oophorectomy(RRSO), typically between 35 and 40 years old and  upon completion of child bearing.     Because ovarian cancer onset in patients with BRCA2 mutations is on average of 8-10 years  later than in patients with BRCA1 mutations, it is reasonable to delay RRSO until 40-45 years in patients with BRCA2 mutations  unless age at diagnosis in the family warrants earlier age for consideration for prophylactic surgery.    Limited data suggest that there may be a slightly increased risk of serous uterine cancer among women with BRCA1+ pathogenic variants. The provider and the patient should discuss the risks and benefits of a concurrent hysterectomy at the time of RRSO for women with a BRCA1+ pathogenic variant /like pathogenic variant prior to surgery.     Salpingectomy alone is not the standard of care for risk reduction although clinical trials are ongoing.     Discuss option of risk-reducing mastectomy.    Consider risks and benefits of risk reducing agents, such as tamoxifen and raloxifene for breast and ovarian cancer.    Consider a full body skin and eye exam for melanoma screening for both BRCA1+ and BRCA2+.     NOTE: Women with BRCA mutation who are treated for breast cancer should have screening of the remaining breast tissue with annual mammography and breast MRI.    The International Cancer of the Pancreas Screening (CAPS) Consortium recommends that individuals with a BRCA2 mutation who have at least one first-degree relative with pancreatic cancer should undergo initial screening with endoscopic ultrasonography and/or MRI/magnetic resonance cholangiopancreatography.         Patient Education   Understanding Breast Density  What is breast density?  Breasts are made of connective tissue, glandular tissue and fatty tissue. Dense breasts have a lot of the first two types of tissue, but not much fat.  Why is it important?  Having dense breasts means you have a slightly higher risk of getting breast cancer. It also means your mammograms will be harder to read. This is because both dense tissue and lumps look white on a mammogram. Fatty tissue looks black. The pictures below show the 4 levels of  breast density:     How do I know if I have dense breasts?  Only a mammogram can tell you if you have dense breasts. The person who reviews your mammogram (radiologist) will give your breasts a density score based on the levels shown above.  What should I do?  Talk to your doctor about your options. But know that mammograms are proven to reduce cancer deaths. Plus, a yearly mammogram is even more important for women who have a higher risk of breast cancer. Your doctor may order other tests as well.  You should also know how your breasts look and feel. Any time you feel or see something that isn't normal, tell your doctor.  For informational purposes only. Not to replace the advice of your health care provider. Images courtesy American College of Radiology (ACR)   and Society of Breast Imaging (SBI). Used with permission. Text copyright   2014 [employer]. All rights reserved. [place] 164425 - 08/14.

## 2020-01-03 NOTE — Clinical Note
1/3/2020       RE: Aida Alberts  [address]  [facility] [address]     Dear Colleague,    Thank you for referring your patient, Aida Alberts, to the [facility]. Please see a copy of my visit note below.    Oncology Risk Management Consultation:  Date on this visit: 1/3/2020    Aida Alberts  is referred by Laz Hernández MD for an oncology risk management consultation. She requires high risk screening and surveillance to reduce  her risk of cancer secondary to having a deleterious BRCA2 mutation and is considered to be at high risk for hereditary breast and ovarian cancer.    Primary Physician: No Ref-Primary, Physician     History Of Present Illness:  Ms. Alberts is a very pleasant, healthy 74 year old female who presents with family history of breast and ovarian cancer and a personal diagnosis of a BRCA2 mutation.     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    Pertinent history:  Nulliparous.   - History of triple negative breast cancer status post lumpectomy, adjuvant chemotherapy and radiation therapy. right side measuring 1.6 cm in size presenting as a mammogram otherwise completely asymptomatic for which she had lumpectomy and sentinel lymph node biopsy. [place] lymph node biopsy did show some positive cells on immunohistochemistry in her lymph node. Subsequent to that she underwent adjuvant chemotherapy with a dose dense [place] and cyclophosphamide for 4 cycles. Then she went on to have radiation therapy. Then at [facility] they discovered that the lymph node might have been positive so she was given adjuvant Taxol therapy as well.     - prophylactic THBSO, pathology benign.    2018 MRI showed questionable shadow in the left breast. Biopsy showed no evidence of any disease only some fibro-cystic  changes and minimal proliferative changes.    Pertinent screening history:  10/7/2019  BILATERAL FULL FIELD DIGITAL SCREENING MAMMOGRAM Performed on: 10/7/19 Compared to: 09/08/2017 Mammo Screening Bilateral and 08/22/2016 Mammo Screening Bilateral Findings: The breasts are heterogeneously dense, which may obscure small masses. There are postsurgical lumpectomy changes in the right breast. No evidence for malignancy and no change from the previous exam(s). Continue routine screening mammogram as recommended. ACR BI-RADS Category 2: Benign Findings      @he reports _________________    Past Medical/Surgical History:  Past Medical History:   Diagnosis Date     BRCA2 positive 01/22/2015    deletion of exons 5-7, identified using single site testing through [employer]     Colonic adenoma     5-6 advanced adenomas in 2013 colonoscopy     Malignant neoplasm of upper-outer quadrant of right breast in female, estrogen receptor negative (H) 2002    infiltrating ductal carcinoma w/DCIS, Stage IIA (T1c, N1, cM0, Free text: ER-ve,ND-ve,[id number] -ve)     No past surgical history on file.    Allergies:  Allergies as of 01/03/2020 - Reviewed 04/09/2018   Allergen Reaction Noted     Amoxicillin-pot clavulanate Nausea and Vomiting 02/15/2016       Current Medications:  Current Outpatient Medications   Medication Sig Dispense Refill     acetaminophen (TYLENOL) 325 MG tablet Take 325-650 mg by mouth       Cholecalciferol (VITAMIN D3) 2000 UNITS CAPS Take 2,000 Units by mouth       diazepam (VALIUM) 5 MG tablet Take 5-10 mg by mouth       fluticasone (FLONASE) 50 MCG/ACT spray Spray 2 sprays in nostril       magnesium 250 MG tablet Take 1 tablet by mouth       rosuvastatin (CRESTOR) 10 MG tablet Take 10 mg by mouth          Family History:  Family History   Problem Relation Age of Onset     Hereditary Breast and Ovarian Cancer Syndrome Sister         BRCA2+, same mutation     Ovarian Cancer Sister 77        endometriod  "adenocarcinoma, grade2     Breast Cancer Niece 40     Melanoma Niece 29     Cancer Paternal Uncle         intestinal or renal cell        Social History:  Social History     Socioeconomic History     Marital status:      Spouse name: Not on file     Number of children: Not on file     Years of education: Not on file     Highest education level: Not on file   Occupational History     Not on file   Social Needs     Financial resource strain: Not on file     Food insecurity:     Worry: Not on file     Inability: Not on file     Transportation needs:     Medical: Not on file     Non-medical: Not on file   Tobacco Use     Smoking status: Former Smoker     Smokeless tobacco: Never Used   Substance and Sexual Activity     Alcohol use: Not on file     Drug use: Not on file     Sexual activity: Not on file   Lifestyle     Physical activity:     Days per week: Not on file     Minutes per session: Not on file     Stress: Not on file   Relationships     Social connections:     Talks on phone: Not on file     Gets together: Not on file     Attends [employer] service: Not on file     Active member of club or organization: Not on file     Attends meetings of clubs or organizations: Not on file     Relationship status: Not on file     Intimate partner violence:     Fear of current or ex partner: Not on file     Emotionally abused: Not on file     Physically abused: Not on file     Forced sexual activity: Not on file   Other Topics Concern     Not on file   Social History Narrative     Not on file         Review of Systems:  {ROS Zebra:[id number]::\"GENERAL: No change in weight, sleep or appetite.  Normal energy.  No fever or chills\",\"EYES: Negative for vision changes or eye problems\",\"ENT: No problems with ears, nose or throat.  No difficulty swallowing.\",\"RESP: No coughing, wheezing or shortness of breath\",\"CV: No chest pains or palpitations\",\"GI: No nausea, vomiting,  heartburn, abdominal pain, diarrhea, constipation or change " "in bowel habits\",\": No urinary frequency or dysuria, bladder or kidney problems\",\"MUSCULOSKELETAL: No significant muscle or joint pains\",\"NEUROLOGIC: No headaches, numbness, tingling, weakness, problems with balance or coordination\",\"PSYCHIATRIC: No problems with anxiety, depression or mental health\",\"HEME/IMMUNE/ALLERGY: No history of bleeding or clotting problems or anemia.  No allergies or immune system problems\",\"ENDOCRINE: No history of thyroid disease, diabetes or other endocrine disorders\",\"SKIN: No rashes,worrisome lesions or skin problems\"}    Physical Exam:  There were no vitals taken for this visit.  {EXAM COMPLETE FEMALE:637895::\"  GENERAL APPEARANCE: healthy, alert and no apparent distress, except for the concern of her risk of cancer\",\"   BREAST: A multipositional, bilateral breast exam was performed.  Fairly symmpetrical -Rsl>L. Right breast: no palpable dominant masses, no nipple discharge, no skin changes. Dense tissue.  Right axilla: no palpable adenopathy. Left breast: no palpable dominant masses, no nipple discharge, no skin changes. Left axilla: no palpable adenopathy. Dense tissue. CARDIOVASCULAR: regular rate and rhythm, normal S1 S2, no S3 or S4 and no murmur. Pulses +1 in all extremities\",\"   ABDOMEN:  soft, nontender, no masses and bowel sounds present in all 4 quadrants\",\"     SKIN: no suspicious lesions or rashes\",\"   HENT: Mouth without ulcers or lesions\",\"   NECK: no adenopathy, no asymmetry or masses\",\"   LYMPHATICS: No cervical, supraclavicular, axillary or inguinal lymphadenopathy\",\"   RESP: lungs clear to auscultation - no rales, rhonchi or wheezes\"}  Laboratory/Imaging Studies  No results found for any visits on 01/03/20.    ASSESSMENT  We discussed the differences between cancer risk for the general population and those with BRCA mutations. It is estimated that 10% of all women with breast cancer have BRCA mutations.  It is estimated that 15% of all women with ovarian cancer " have BRCA mutations. We also discussed that inheriting a mutation does not mean that a person will develop cancer, but rather that they are at increased risk.     Women with BRCA2 mutations have an estimated 45-49% cumulative risk of breast cancer by age 70, compared to the general population risk of 12%.  The risk of developing contralateral breast cancer within BRCA2 carriers  is higher when the age at first diagnosis is less than age 40. It is estimated that in women whose breast cancer have a 34.6% risk for contralaterl breast cancer within 10- years of their initial diagnosis (with no intervention.)    The risk for ovarian ccancer, including primary peritoneal and fallopian tube cancer, by age 70 is estimated to be between 11-18%. Male BRCA2+ carriers have a cumulative breast cancer risk of more than 6% by age 70. Both men and women have an increased risk of pancreatic cancer,  Melanoma, gall bladder, bile duct and stomach cancer compared to the general population, although the exact risks have not been well defined.     We also discussed the risks and benefits of prophylactic bilateral salpingo-ooperectomy. Research involving 10 studies of BRCA1 and BRCA2 mutation carriers showed an 80% reduction the risk for fallopian tube and ovarian cancer following risk reducing salpingo-oophorectomy, with a residual risk of peritoneal carcinoma of 1-2%. Research shows the a bilateral salpingo-ooperectomy can also reduce the risk of breast cancer by 50%, especially in women 40 years old and younger.  If she wants to discuss this option further, I will be happy to refer her to an appropriate surgeon.    We discussed recommendations for birth control.  The use of oral contraceptives is associated with a significant reduced risk of ovarian cancer for BRCA1/2 carriers with a significant 36% risk reduction in ovarian cancers for each additional 10 years of oral contraceptive use.  (Dudley et al.2010.Oral contraceptive use and  breast or ovarian cancer risk in BRCA1/2 carriers: A meta-analysis.  Journal of Cancer. 46:12: 1877-6905.     We discussed signs and symptoms to be watchful for and she practiced palpating with the clinic's breast simulation model. She verbalized understanding of signs and symptoms to address with her physicians (vaginal bleeding, unresolved bloating, pain, tenderness, early satiety) and lumps, thickening, swelling, tenderness, nipple discharge or changes in skin of breasts.    We also discussed following  a healthy lifestyle plan recommended by both NCCN and the American Cancer Society that can reduce the risk of cancer:  1 Limit alcohol consumption to less than 1 drink per day (1 drink=5 oz.wine, 12 oz. Beer or 1.5 oz. 80-proof liquor).  2. Exercise per American Cancer Society guidelines of at least 150 minutes of moderate-intensity activity or 75 minutes of vigorous activity each week. (Or a combination of both.) Exercise should be spread  out over the week.  3. Maintain a healthy weight with a Body Mass Index between 19-24.9.  4. Do not use tobacco products and limit exposure to passive smoke.    INDIVIDUALIZED CANCER RISK MANAGEMENT PLAN:            I will be happy to work with her to manage her cancer risk, will follow up on her screenings and see her in the Cancer Risk Management clinic in six months.    I spent xx minutes with the patient with greater that 50% of it in counseling and coordinating care as documented above.                          Again, thank you for allowing me to participate in the care of your patient.      Sincerely,    JACKSON Ward CNS

## 2020-01-09 DIAGNOSIS — F41.9 ANXIETY: Primary | ICD-10-CM

## 2020-01-09 RX ORDER — LORAZEPAM 1 MG/1
TABLET ORAL
Qty: 2 TABLET | Refills: 0 | Status: SHIPPED | OUTPATIENT
Start: 2020-01-09 | End: 2021-06-15

## 2020-01-10 ENCOUNTER — RECORDS - HEALTHEAST (OUTPATIENT)
Dept: ADMINISTRATIVE | Facility: OTHER | Age: 75
End: 2020-01-10

## 2020-06-18 ENCOUNTER — HOSPITAL ENCOUNTER (OUTPATIENT)
Dept: MRI IMAGING | Facility: CLINIC | Age: 75
Discharge: HOME | End: 2020-06-18
Attending: CLINICAL NURSE SPECIALIST | Admitting: CLINICAL NURSE SPECIALIST
Payer: COMMERCIAL

## 2020-06-18 DIAGNOSIS — Z85.3 HISTORY OF MALIGNANT NEOPLASM OF BREAST: ICD-10-CM

## 2020-06-18 DIAGNOSIS — Z15.01 BRCA2 POSITIVE: ICD-10-CM

## 2020-06-18 DIAGNOSIS — Z15.09 BRCA2 POSITIVE: ICD-10-CM

## 2020-06-18 LAB
CREAT BLD-MCNC: 0.5 MG/DL (ref 0.52–1.04)
GFR SERPL CREATININE-BSD FRML MDRD: >90 ML/MIN/{1.73_M2}

## 2020-06-18 PROCEDURE — 25500064 ZZH RX 255 OP 636: Performed by: CLINICAL NURSE SPECIALIST

## 2020-06-18 PROCEDURE — A9585 GADOBUTROL INJECTION: HCPCS | Performed by: CLINICAL NURSE SPECIALIST

## 2020-06-18 PROCEDURE — 77049 MRI BREAST C-+ W/CAD BI: CPT

## 2020-06-18 PROCEDURE — 82565 ASSAY OF CREATININE: CPT

## 2020-06-18 RX ORDER — GADOBUTROL 604.72 MG/ML
10 INJECTION INTRAVENOUS ONCE
Status: COMPLETED | OUTPATIENT
Start: 2020-06-18 | End: 2020-06-18

## 2020-06-18 RX ADMIN — GADOBUTROL 10 ML: 604.72 INJECTION INTRAVENOUS at 12:02

## 2020-09-17 ENCOUNTER — COMMUNICATION - HEALTHEAST (OUTPATIENT)
Dept: ONCOLOGY | Facility: HOSPITAL | Age: 75
End: 2020-09-17

## 2020-09-30 ENCOUNTER — COMMUNICATION - HEALTHEAST (OUTPATIENT)
Dept: ONCOLOGY | Facility: HOSPITAL | Age: 75
End: 2020-09-30

## 2020-11-05 ENCOUNTER — COMMUNICATION - HEALTHEAST (OUTPATIENT)
Dept: ADMINISTRATIVE | Facility: HOSPITAL | Age: 75
End: 2020-11-05

## 2020-11-06 ENCOUNTER — COMMUNICATION - HEALTHEAST (OUTPATIENT)
Dept: ONCOLOGY | Facility: HOSPITAL | Age: 75
End: 2020-11-06

## 2020-11-24 ENCOUNTER — AMBULATORY - HEALTHEAST (OUTPATIENT)
Dept: OTHER | Facility: CLINIC | Age: 75
End: 2020-11-24

## 2020-11-24 DIAGNOSIS — C50.411 MALIGNANT NEOPLASM OF UPPER-OUTER QUADRANT OF RIGHT FEMALE BREAST (H): ICD-10-CM

## 2020-12-02 ENCOUNTER — AMBULATORY - HEALTHEAST (OUTPATIENT)
Dept: OTHER | Facility: CLINIC | Age: 75
End: 2020-12-02

## 2020-12-18 ENCOUNTER — ANCILLARY PROCEDURE (OUTPATIENT)
Dept: MAMMOGRAPHY | Facility: CLINIC | Age: 75
End: 2020-12-18
Attending: CLINICAL NURSE SPECIALIST
Payer: COMMERCIAL

## 2020-12-18 DIAGNOSIS — Z15.01 BRCA2 POSITIVE: ICD-10-CM

## 2020-12-18 DIAGNOSIS — Z15.09 BRCA2 POSITIVE: ICD-10-CM

## 2020-12-18 DIAGNOSIS — Z85.3 HISTORY OF MALIGNANT NEOPLASM OF BREAST: ICD-10-CM

## 2020-12-18 PROCEDURE — 77067 SCR MAMMO BI INCL CAD: CPT | Performed by: STUDENT IN AN ORGANIZED HEALTH CARE EDUCATION/TRAINING PROGRAM

## 2020-12-18 PROCEDURE — 77063 BREAST TOMOSYNTHESIS BI: CPT | Performed by: STUDENT IN AN ORGANIZED HEALTH CARE EDUCATION/TRAINING PROGRAM

## 2020-12-21 ENCOUNTER — TRANSFERRED RECORDS (OUTPATIENT)
Dept: HEALTH INFORMATION MANAGEMENT | Facility: CLINIC | Age: 75
End: 2020-12-21

## 2020-12-22 ENCOUNTER — VIRTUAL VISIT (OUTPATIENT)
Dept: ONCOLOGY | Facility: CLINIC | Age: 75
End: 2020-12-22
Attending: CLINICAL NURSE SPECIALIST
Payer: COMMERCIAL

## 2020-12-22 ENCOUNTER — TRANSFERRED RECORDS (OUTPATIENT)
Dept: HEALTH INFORMATION MANAGEMENT | Facility: CLINIC | Age: 75
End: 2020-12-22

## 2020-12-22 DIAGNOSIS — Z15.09 BRCA2 POSITIVE: Primary | ICD-10-CM

## 2020-12-22 DIAGNOSIS — Z15.01 BRCA2 POSITIVE: Primary | ICD-10-CM

## 2020-12-22 PROCEDURE — 99213 OFFICE O/P EST LOW 20 MIN: CPT | Mod: GT | Performed by: CLINICAL NURSE SPECIALIST

## 2020-12-22 NOTE — PROGRESS NOTES
"Aida Alberts is a 75 year old female who is being evaluated via a billable video visit.      The patient has been notified of following:     \"This video visit will be conducted via a call between you and your physician/provider. We have found that certain health care needs can be provided without the need for an in-person physical exam.  This service lets us provide the care you need with a video conversation.  If a prescription is necessary we can send it directly to your pharmacy.  If lab work is needed we can place an order for that and you can then stop by our lab to have the test done at a later time.    Video visits are billed at different rates depending on your insurance coverage.  Please reach out to your insurance provider with any questions.    If during the course of the call the physician/provider feels a video visit is not appropriate, you will not be charged for this service.\"    Patient has given verbal consent for Video visit? Yes  How would you like to obtain your AVS? MyChart  If you are dropped from the video visit, the video invite should be resent to: Send to e-mail at: [email or web address]  Will anyone else be joining your video visit? No      Vitals - Patient Reported  Weight (Patient Reported): 108.9 kg (240 lb)  Height (Patient Reported): 166.4 cm (5' 5.5\")  BMI (Based on Pt Reported Ht/Wt): 39.33  Pain Score: No Pain (0)    Rita Lui CMA December 22, 2020  4:00 PM     Video-Visit Details    Type of service:  Video Visit    Video Start Time: 1633  Video End Time: 1655    Originating Location (pt. Location): Home    Distant Location (provider location):  [facility]     Platform used for Video Visit: [employer]    Oncology Risk Management Consultation:  Date on this visit: 12/22/2020    Aida Alberts is participating in a billable video visit today for follow up.  She requires screening and surveillance to minimize her risk of cancer " secondary to having a deleterious BRCA2 mutation.  She is considered to be at high risk for hereditary breast and ovarian cancer.    Primary Physician: No Ref-Primary, Physician     History Of Present Illness:  Ms. 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      Genetic Testin2015 - Positive for a deleterious BRCA2+, deletions of exons 5-7, which is the same mutation identified in her sister who was diagnosed with stage IV ovarian cancer. The testing was done using a single site genetic test from [facility].     Pertinent history:  Menarche (age unknown)  Nulliparous.  Menopause in 40s.  Breast density: Heterogeneously dense.  History of triple negative breast cancer diagnosed in  located on the right side measuring 1.6 cm in size presenting as a mammogram otherwise completely asymptomatic for which she had lumpectomy and sentinel lymph node biopsy. [place] lymph node biopsy did show some positive cells on immunohistochemistry in her lymph node. Subsequent to that she underwent adjuvant chemotherapy with a dose dense [place] and cyclophosphamide for 4 cycles. Then she went on to have radiation therapy. Then at [facility] they discovered that the lymph node might have been positive so she was given adjuvant Taxol therapy as well.     2016 [facility] pathology showed benign endometrial polyp (2.7 cm), benign submucosal, intramural and subserosal leiomyomata (6; up to 0.9 cm), deep adenomyosis, endometriosis, uterine serosa, bilateral ovaries and fallopian tubes with no evidence of dysplasia or malignancy.  Mild chronic cervicitis.  No evidence of cervical dysplasia.     History of OCPs: No  History of HRT: No     Pertinent screening history:  5/15/2000- Mammogram  2000 - bilateral breast ultrasound  2002- Mammogram -  2003- Mammogram  2013 - Breast imaging ([facility]?)  2015 - Screening  mammogram, BiRads0 for 1.2 cm partially circumscribed mass in upper outer left breast at 2:00.   1/9/2015 - Left breast US, BiRads3, benign cyst and probable cluster of benign microcysts at 2:00 within zone 2-3  5/28/2015 - Left breast US, BiRads3, Cluster of probable benign microcysts 2:00 zone 3, located approximately 7 cm from the nipple, have decreased slightly in size.  6/25/2015 - Mammogram ([facility])  7/24/2015 - Breast MRI, BiRads2.  2/19/2016 - Mammogram ([facility])  2/19/2016 - Left breast US and Diagnostic tomosynthesis mammogram both, BiRads 0 for 2:00 position zone 2 demonstrates a hypoechoic mass 1.5 x 0.7 cm. This most likely represents a cyst. internal echoes within the mass.   2/22/2016 - Breast MRI, BiRads2, likely benign cyst in left breast.  8/22/2016 - [facility]  8/25/2016 - Screening mammogram, BiRads2  3/6/2017 -Breast MRI, BiRads2.  9/8/2017 - Screening mammogram, BiRads2  4/20/2018 - Breast MRI, BiRads4,  LEFT BREAST: Regional area of clumped retroareolar plane enhancement 4.2 x 1.2 cm for which MR-guided biopsy is recommended as this is indeterminate.  5/11/2018 - Left breast biopsy, Pathology reveals fibrocystic change.  10/7/2019 - Screening mammogram, BiRads2.  6/18/2020- Breast MRI, BiRads2  12/18/2020- Screening tomosynthesis mammogram, BiRads2        Patient reports history of multiple adenomas, no records available for:  5/8/2013 - Diagnostic colonoscopy  6/26/2015 - Colonoscopy  10/17/2016 - Colonoscopy  11/17/2017 - Colonoscopy  12/21/2020- Colonoscopy (Dr. Kwong, [facility], [facility]), records requested     At this visit, she denies new fatigue, breast pain, asymmetry, lumps, masses, thickening, nipple discharge and skin changes in her breasts. She denies any urinary urgency, frequency, abdominal pain, bloating and early satiety.    Past Medical/Surgical History:  Past Medical History:   Diagnosis Date     BRCA2 positive 01/22/2015    deletion of exons 5-7, identified using single site  testing through [facility]     Colonic adenoma     5-6 advanced adenomas in 2013 colonoscopy     Malignant neoplasm of upper-outer quadrant of right breast in female, estrogen receptor negative ([facility]) 2002    infiltrating ductal carcinoma w/DCIS, Stage IIA (T1c, N1, cM0, Free text: ER-ve,CT-ve,Yzt3rga -ve)     No past surgical history on file.    Allergies:  Allergies as of 12/22/2020 - Reviewed 12/22/2020   Allergen Reaction Noted     Cefuroxime Hives 06/02/2018     Amoxicillin-pot clavulanate Nausea and Vomiting 02/15/2016       Current Medications:  Current Outpatient Medications   Medication Sig Dispense Refill     acetaminophen (TYLENOL) 325 MG tablet Take 325-650 mg by mouth       Cholecalciferol (VITAMIN D3) 2000 UNITS CAPS Take 2,000 Units by mouth       fluticasone (FLONASE) 50 MCG/ACT spray Spray 2 sprays in nostril       rosuvastatin (CRESTOR) 10 MG tablet Take 20 mg by mouth        diazepam (VALIUM) 5 MG tablet Take 5-10 mg by mouth       LORazepam (ATIVAN) 1 MG tablet Take 30 minutes prior to departure for breast MRI.  Do not operate a vehicle after taking this medication (Patient not taking: Reported on 12/22/2020) 2 tablet 0     magnesium 250 MG tablet Take 1 tablet by mouth          Family History:  Family History   Problem Relation Age of Onset     Hereditary Breast and Ovarian Cancer Syndrome Sister         BRCA2+, same mutation     Ovarian Cancer Sister 77        endometriod adenocarcinoma, grade2     Breast Cancer Niece 40     Melanoma Niece 29     Cancer Paternal Uncle         intestinal or renal cell        Social History:  Social History     Socioeconomic History     Marital status:      Spouse name: Not on file     Number of children: 2     Years of education: Not on file     Highest education level: Not on file   Occupational History     Occupation: Owner     Employer: [employer]   Social Needs     Financial resource strain: Not on file     Food insecurity     Worry: Not  on file     Inability: Not on file     Transportation needs     Medical: Not on file     Non-medical: Not on file   Tobacco Use     Smoking status: Former Smoker     Packs/day: 0.50     Years: 17.00     Pack years: 8.50     Types: Cigarettes     Start date: 1/3/1965     Quit date: 1/3/1982     Years since quittin.9     Smokeless tobacco: Never Used   Substance and Sexual Activity     Alcohol use: Yes     Drug use: Never     Sexual activity: Not Currently   Lifestyle     Physical activity     Days per week: Not on file     Minutes per session: Not on file     Stress: Not on file   Relationships     Social connections     Talks on phone: Not on file     Gets together: Not on file     Attends Protestant service: Not on file     Active member of club or organization: Not on file     Attends meetings of clubs or organizations: Not on file     Relationship status: Not on file     Intimate partner violence     Fear of current or ex partner: Not on file     Emotionally abused: Not on file     Physically abused: Not on file     Forced sexual activity: Not on file   Other Topics Concern     Not on file   Social History Narrative     Not on file       Physical Exam:  LMP  (LMP Unknown)   GENERAL: Healthy, alert and no distress  EYES: Eyes grossly normal to inspection.  No discharge or erythema, or obvious scleral/conjunctival abnormalities.  RESP: No audible wheeze, cough, or visible cyanosis.  No visible retractions or increased work of breathing.    SKIN: Visible skin clear. No significant rash, abnormal pigmentation or lesions.  NEURO: Cranial nerves grossly intact.  Mentation and speech appropriate for age.  PSYCH: Mentation appears normal, affect normal/bright, judgement and insight intact, normal speech and appearance well-groomed.    Laboratory/Imaging Studies  No results found for any visits on 20.    ASSESSMENT  We reviwed her mammogram results and discussed the updated BRCA2+ guidelines per NCCN, listed  below.      We discussed that she should continue to have dermatology screening with Dr. Demetrice Billingsley, at [facility] in [place].  She is due to see her in March/April.    She should continue annual eye exams with [facility], which she is vigilant about, as she has a family history of macular degeneration.  She will let her next opthalomoloigst (her last one retired) that she is BRCA2+ and at elevated risk for ocular melanoma.    She did have a colonoscopy yesterday, as she is having these due to a history of colonic adenomas. Pathology results are pending on the polyps that were removed. Records are reequested.    INDIVIDUALIZED CANCER RISK MANAGEMENT PLAN:  Individualized Surveillance Plan for women  Hereditary Breast and/or Ovarian Cancer Syndrome   Per NCCN Guidelines Version 1.2020   Recommended screening Test or procedure Last done Next Scheduled    Breast self awareness starting at age 18. Women should be familiar with their breasts and promptly report changes to their care provider. Periodic, consistent self exam may facilitate breast self awareness. Premenopausal women may find self exams to be most informative when performed at the end of menses.   12/22/2020 December 2021   Breast screening, starting at age 25 Clinical breast exams every 6 -12 months Deferred December 2021   Breast screening   Age 25-29 Annual breast MRI screening with contrast (or mammogram if MRI is unavailable) or individualized based on family history if a breast cancer diagnosis before age 30 is present.       Breast MRI is performed preferably on day 7-15 of menstrual cycle for premenopausal women.     See below     See below   Breast screening   Age >30-75 years     Annual mammogram (consider tomosynthesis mammogram) and annual screening MRI.     Breast MRI is performed preferably on day 7-15 of menstrual cycle for premenopausal women.    Age>75 years, management should be considered on an individual  basis. 6/18/2020- Breast MRI, BiRads2    12/18/2020- Screening tomosynthesis mammogram, BiRads2 Breast MRI in late June (6/19 or later)    Next exam: December 2021 followed by mammogram   Ovarian cancer screening, starting at age 30-35 Consider transvaginal ultrasound and  tests. NA- THBSO in 2016 NA   Pancreatic Cancer Screening beginning at age 50 or 10 years prior to the earliest pancreatic cancer on the BRCA-side of the family  In families with exocrine pancreatic cancer in a first or second degree relative     Consider Annual MRI/MRCP and/or Endoscopic Ultrasound   Discuss family history- no FH of pancreatic cancer that we know of   Review at next visit   Recommendation: risk-reducing salpingo-oophorectomy(RRSO), typically between 35 and 40 years old and  upon completion of child bearing.     Because ovarian cancer onset in patients with BRCA2 mutations is on average of 8-10 years later than in patients with BRCA1 mutations, it is reasonable to delay RRSO until 40-45 years in patients with BRCA2 mutations  unless age at diagnosis in the family warrants earlier age for consideration for prophylactic surgery.    Limited data suggest that there may be a slightly increased risk of serous uterine cancer among women with BRCA1+ pathogenic variants. The provider and the patient should discuss the risks and benefits of a concurrent hysterectomy at the time of RRSO for women with a BRCA1+ pathogenic variant /like pathogenic variant prior to surgery.     Salpingectomy alone is not the standard of care for risk reduction although clinical trials are ongoing.     Discuss option of risk-reducing mastectomy.    Consider risks and benefits of risk reducing agents, such as tamoxifen and raloxifene for breast and ovarian cancer.    Consider a full body skin and eye exam for melanoma screening for both BRCA1+ and BRCA2+.     NOTE: Women with BRCA mutation who are treated for breast cancer should have screening of the  remaining breast tissue with annual mammography and breast MRI.         Estrella Her, JACKSON-CNS, OCN, AGN-BC  Clinical Nurse Specialist  Cancer Risk Management Program  [facility] [address]  [place] [address]    phone:  [phone]  Pager: [phone]  fax: [phone]    CC: Dr. Hernández

## 2020-12-22 NOTE — LETTER
"    12/22/2020         RE: Aida Alberts  [address]  [facility] [address]        Dear Colleague,    Thank you for referring your patient, Aida Alberts, to the [facility]. Please see a copy of my visit note below.    Aida Alberts is a 75 year old female who is being evaluated via a billable video visit.      The patient has been notified of following:     \"This video visit will be conducted via a call between you and your physician/provider. We have found that certain health care needs can be provided without the need for an in-person physical exam.  This service lets us provide the care you need with a video conversation.  If a prescription is necessary we can send it directly to your pharmacy.  If lab work is needed we can place an order for that and you can then stop by our lab to have the test done at a later time.    Video visits are billed at different rates depending on your insurance coverage.  Please reach out to your insurance provider with any questions.    If during the course of the call the physician/provider feels a video visit is not appropriate, you will not be charged for this service.\"    Patient has given verbal consent for Video visit? Yes  How would you like to obtain your AVS? MyChart  If you are dropped from the video visit, the video invite should be resent to: Send to e-mail at: [email or web address]  Will anyone else be joining your video visit? No      Vitals - Patient Reported  Weight (Patient Reported): 108.9 kg (240 lb)  Height (Patient Reported): 166.4 cm (5' 5.5\")  BMI (Based on Pt Reported Ht/Wt): 39.33  Pain Score: No Pain (0)    Rita Lui CMA December 22, 2020  4:00 PM     Video-Visit Details    Type of service:  Video Visit    Video Start Time: 1633  Video End Time: 1655    Originating Location (pt. Location): Home    Distant Location (provider location):  [facility] "     Platform used for Video Visit: [facility]    Oncology Risk Management Consultation:  Date on this visit: 2020    Aida Alberts is participating in a billable video visit today for follow up.  She requires screening and surveillance to minimize her risk of cancer secondary to having a deleterious BRCA2 mutation.  She is considered to be at high risk for hereditary breast and ovarian cancer.    Primary Physician: No Ref-Primary, Physician     History Of Present Illness:  Ms. 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      Genetic Testin2015 - Positive for a deleterious BRCA2+, deletions of exons 5-7, which is the same mutation identified in her sister who was diagnosed with stage IV ovarian cancer. The testing was done using a single site genetic test from [employer].     Pertinent history:  Menarche (age unknown)  Nulliparous.  Menopause in 40s.  Breast density: Heterogeneously dense.  History of triple negative breast cancer diagnosed in  located on the right side measuring 1.6 cm in size presenting as a mammogram otherwise completely asymptomatic for which she had lumpectomy and sentinel lymph node biopsy. [place] lymph node biopsy did show some positive cells on immunohistochemistry in her lymph node. Subsequent to that she underwent adjuvant chemotherapy with a dose dense [place] and cyclophosphamide for 4 cycles. Then she went on to have radiation therapy. Then at [facility] they discovered that the lymph node might have been positive so she was given adjuvant Taxol therapy as well.     2016 [facility] pathology showed benign endometrial polyp (2.7 cm), benign submucosal, intramural and subserosal leiomyomata (6; up to 0.9 cm), deep adenomyosis, endometriosis, uterine serosa, bilateral ovaries and fallopian tubes with no evidence of dysplasia or malignancy.  Mild chronic cervicitis.   No evidence of cervical dysplasia.     History of OCPs: No  History of HRT: No     Pertinent screening history:  5/15/2000- Mammogram  5/18/2000 - bilateral breast ultrasound  6/5/2002- Mammogram -  6/20/2003- Mammogram  1/31/2013 - Breast imaging ([facility]?)  1/2/2015 - Screening mammogram, BiRads0 for 1.2 cm partially circumscribed mass in upper outer left breast at 2:00.   1/9/2015 - Left breast US, BiRads3, benign cyst and probable cluster of benign microcysts at 2:00 within zone 2-3  5/28/2015 - Left breast US, BiRads3, Cluster of probable benign microcysts 2:00 zone 3, located approximately 7 cm from the nipple, have decreased slightly in size.  6/25/2015 - Mammogram ([facility])  7/24/2015 - Breast MRI, BiRads2.  2/19/2016 - Mammogram ([facility])  2/19/2016 - Left breast US and Diagnostic tomosynthesis mammogram both, BiRads 0 for 2:00 position zone 2 demonstrates a hypoechoic mass 1.5 x 0.7 cm. This most likely represents a cyst. internal echoes within the mass.   2/22/2016 - Breast MRI, BiRads2, likely benign cyst in left breast.  8/22/2016 - [facility]  8/25/2016 - Screening mammogram, BiRads2  3/6/2017 -Breast MRI, BiRads2.  9/8/2017 - Screening mammogram, BiRads2  4/20/2018 - Breast MRI, BiRads4,  LEFT BREAST: Regional area of clumped retroareolar plane enhancement 4.2 x 1.2 cm for which MR-guided biopsy is recommended as this is indeterminate.  5/11/2018 - Left breast biopsy, Pathology reveals fibrocystic change.  10/7/2019 - Screening mammogram, BiRads2.  6/18/2020- Breast MRI, BiRads2  12/18/2020- Screening tomosynthesis mammogram, BiRads2        Patient reports history of multiple adenomas, no records available for:  5/8/2013 - Diagnostic colonoscopy  6/26/2015 - Colonoscopy  10/17/2016 - Colonoscopy  11/17/2017 - Colonoscopy  12/21/2020- Colonoscopy (Dr. Kwong, [facility]), records requested     At this visit, she denies new fatigue, breast pain, asymmetry, lumps, masses, thickening, nipple discharge and  skin changes in her breasts. She denies any urinary urgency, frequency, abdominal pain, bloating and early satiety.    Past Medical/Surgical History:  Past Medical History:   Diagnosis Date     BRCA2 positive 01/22/2015    deletion of exons 5-7, identified using single site testing through [facility]     Colonic adenoma     5-6 advanced adenomas in 2013 colonoscopy     Malignant neoplasm of upper-outer quadrant of right breast in female, estrogen receptor negative (H) 2002    infiltrating ductal carcinoma w/DCIS, Stage IIA (T1c, N1, cM0, Free text: ER-ve,NV-ve,Wsz0bwr -ve)     No past surgical history on file.    Allergies:  Allergies as of 12/22/2020 - Reviewed 12/22/2020   Allergen Reaction Noted     Cefuroxime Hives 06/02/2018     Amoxicillin-pot clavulanate Nausea and Vomiting 02/15/2016       Current Medications:  Current Outpatient Medications   Medication Sig Dispense Refill     acetaminophen (TYLENOL) 325 MG tablet Take 325-650 mg by mouth       Cholecalciferol (VITAMIN D3) 2000 UNITS CAPS Take 2,000 Units by mouth       fluticasone (FLONASE) 50 MCG/ACT spray Spray 2 sprays in nostril       rosuvastatin (CRESTOR) 10 MG tablet Take 20 mg by mouth        diazepam (VALIUM) 5 MG tablet Take 5-10 mg by mouth       LORazepam (ATIVAN) 1 MG tablet Take 30 minutes prior to departure for breast MRI.  Do not operate a vehicle after taking this medication (Patient not taking: Reported on 12/22/2020) 2 tablet 0     magnesium 250 MG tablet Take 1 tablet by mouth          Family History:  Family History   Problem Relation Age of Onset     Hereditary Breast and Ovarian Cancer Syndrome Sister         BRCA2+, same mutation     Ovarian Cancer Sister 77        endometriod adenocarcinoma, grade2     Breast Cancer Niece 40     Melanoma Niece 29     Cancer Paternal Uncle         intestinal or renal cell        Social History:  Social History     Socioeconomic History     Marital status:      Spouse name: Not on file      Number of children: 2     Years of education: Not on file     Highest education level: Not on file   Occupational History     Occupation: Owner     Employer: [employer]   Social Needs     Financial resource strain: Not on file     Food insecurity     Worry: Not on file     Inability: Not on file     Transportation needs     Medical: Not on file     Non-medical: Not on file   Tobacco Use     Smoking status: Former Smoker     Packs/day: 0.50     Years: 17.00     Pack years: 8.50     Types: Cigarettes     Start date: 1/3/1965     Quit date: 1/3/1982     Years since quittin.9     Smokeless tobacco: Never Used   Substance and Sexual Activity     Alcohol use: Yes     Drug use: Never     Sexual activity: Not Currently   Lifestyle     Physical activity     Days per week: Not on file     Minutes per session: Not on file     Stress: Not on file   Relationships     Social connections     Talks on phone: Not on file     Gets together: Not on file     Attends [employer] service: Not on file     Active member of club or organization: Not on file     Attends meetings of clubs or organizations: Not on file     Relationship status: Not on file     Intimate partner violence     Fear of current or ex partner: Not on file     Emotionally abused: Not on file     Physically abused: Not on file     Forced sexual activity: Not on file   Other Topics Concern     Not on file   Social History Narrative     Not on file       Physical Exam:  LMP  (LMP Unknown)   GENERAL: Healthy, alert and no distress  EYES: Eyes grossly normal to inspection.  No discharge or erythema, or obvious scleral/conjunctival abnormalities.  RESP: No audible wheeze, cough, or visible cyanosis.  No visible retractions or increased work of breathing.    SKIN: Visible skin clear. No significant rash, abnormal pigmentation or lesions.  NEURO: Cranial nerves grossly intact.  Mentation and speech appropriate for age.  PSYCH: Mentation appears normal, affect  normal/bright, judgement and insight intact, normal speech and appearance well-groomed.    Laboratory/Imaging Studies  No results found for any visits on 12/22/20.    ASSESSMENT  We reviwed her mammogram results and discussed the updated BRCA2+ guidelines per NCCN, listed below.      We discussed that she should continue to have dermatology screening with Dr. Demetrice Billingsley, at [facility] in [place].  She is due to see her in March/April.    She should continue annual eye exams with [facility], which she is vigilant about, as she has a family history of macular degeneration.  She will let her next opthalomoloigst (her last one retired) that she is BRCA2+ and at elevated risk for ocular melanoma.    She did have a colonoscopy yesterday, as she is having these due to a history of colonic adenomas. Pathology results are pending on the polyps that were removed. Records are reequested.    INDIVIDUALIZED CANCER RISK MANAGEMENT PLAN:  Individualized Surveillance Plan for women  Hereditary Breast and/or Ovarian Cancer Syndrome   Per NCCN Guidelines Version 1.2020   Recommended screening Test or procedure Last done Next Scheduled    Breast self awareness starting at age 18. Women should be familiar with their breasts and promptly report changes to their care provider. Periodic, consistent self exam may facilitate breast self awareness. Premenopausal women may find self exams to be most informative when performed at the end of menses.   12/22/2020 December 2021   Breast screening, starting at age 25 Clinical breast exams every 6 -12 months Deferred December 2021   Breast screening   Age 25-29 Annual breast MRI screening with contrast (or mammogram if MRI is unavailable) or individualized based on family history if a breast cancer diagnosis before age 30 is present.       Breast MRI is performed preferably on day 7-15 of menstrual cycle for premenopausal women.     See below     See below    Breast screening   Age >30-75 years     Annual mammogram (consider tomosynthesis mammogram) and annual screening MRI.     Breast MRI is performed preferably on day 7-15 of menstrual cycle for premenopausal women.    Age>75 years, management should be considered on an individual basis. 6/18/2020- Breast MRI, BiRads2    12/18/2020- Screening tomosynthesis mammogram, BiRads2 Breast MRI in late June (6/19 or later)    Next exam: December 2021 followed by mammogram   Ovarian cancer screening, starting at age 30-35 Consider transvaginal ultrasound and  tests. NA- THBSO in 2016 NA   Pancreatic Cancer Screening beginning at age 50 or 10 years prior to the earliest pancreatic cancer on the BRCA-side of the family  In families with exocrine pancreatic cancer in a first or second degree relative     Consider Annual MRI/MRCP and/or Endoscopic Ultrasound   Discuss family history- no FH of pancreatic cancer that we know of   Review at next visit   Recommendation: risk-reducing salpingo-oophorectomy(RRSO), typically between 35 and 40 years old and  upon completion of child bearing.     Because ovarian cancer onset in patients with BRCA2 mutations is on average of 8-10 years later than in patients with BRCA1 mutations, it is reasonable to delay RRSO until 40-45 years in patients with BRCA2 mutations  unless age at diagnosis in the family warrants earlier age for consideration for prophylactic surgery.    Limited data suggest that there may be a slightly increased risk of serous uterine cancer among women with BRCA1+ pathogenic variants. The provider and the patient should discuss the risks and benefits of a concurrent hysterectomy at the time of RRSO for women with a BRCA1+ pathogenic variant /like pathogenic variant prior to surgery.     Salpingectomy alone is not the standard of care for risk reduction although clinical trials are ongoing.     Discuss option of risk-reducing mastectomy.    Consider risks and benefits of  risk reducing agents, such as tamoxifen and raloxifene for breast and ovarian cancer.    Consider a full body skin and eye exam for melanoma screening for both BRCA1+ and BRCA2+.     NOTE: Women with BRCA mutation who are treated for breast cancer should have screening of the remaining breast tissue with annual mammography and breast MRI.         JACKSON Ward-CNS, OCN, AGN-BC  Clinical Nurse Specialist  Cancer Risk Management Program  [facility] [address]  [place] [address]    phone:  [phone]  Pager: [phone]  fax: [phone]    CC: Dr. Hernández

## 2021-01-05 ENCOUNTER — RECORDS - HEALTHEAST (OUTPATIENT)
Dept: RADIOLOGY | Facility: CLINIC | Age: 76
End: 2021-01-05

## 2021-01-06 ENCOUNTER — OFFICE VISIT - HEALTHEAST (OUTPATIENT)
Dept: ONCOLOGY | Facility: HOSPITAL | Age: 76
End: 2021-01-06

## 2021-01-06 DIAGNOSIS — Z15.09 GENETIC PREDISPOSITION TO CANCER: ICD-10-CM

## 2021-01-06 DIAGNOSIS — C50.411 MALIGNANT NEOPLASM OF UPPER-OUTER QUADRANT OF RIGHT BREAST IN FEMALE, ESTROGEN RECEPTOR NEGATIVE (H): ICD-10-CM

## 2021-01-06 DIAGNOSIS — Z15.01 BRCA2 POSITIVE: ICD-10-CM

## 2021-01-06 DIAGNOSIS — Z15.09 BRCA2 POSITIVE: ICD-10-CM

## 2021-01-06 DIAGNOSIS — Z17.1 MALIGNANT NEOPLASM OF UPPER-OUTER QUADRANT OF RIGHT BREAST IN FEMALE, ESTROGEN RECEPTOR NEGATIVE (H): ICD-10-CM

## 2021-01-15 ENCOUNTER — HEALTH MAINTENANCE LETTER (OUTPATIENT)
Age: 76
End: 2021-01-15

## 2021-03-25 ENCOUNTER — TRANSFERRED RECORDS (OUTPATIENT)
Dept: HEALTH INFORMATION MANAGEMENT | Facility: CLINIC | Age: 76
End: 2021-03-25
Payer: COMMERCIAL

## 2021-05-24 ENCOUNTER — RECORDS - HEALTHEAST (OUTPATIENT)
Dept: ADMINISTRATIVE | Facility: CLINIC | Age: 76
End: 2021-05-24

## 2021-05-25 ENCOUNTER — TELEPHONE (OUTPATIENT)
Dept: ONCOLOGY | Facility: CLINIC | Age: 76
End: 2021-05-25

## 2021-05-25 ENCOUNTER — RECORDS - HEALTHEAST (OUTPATIENT)
Dept: ADMINISTRATIVE | Facility: CLINIC | Age: 76
End: 2021-05-25

## 2021-05-25 NOTE — TELEPHONE ENCOUNTER
5/25/21 - [facility] to call [phone] opt 5, opt2 to schedule the following for Estrella Her:    1.  MRI AFTER June 19th (SD or )  2.  In person visit with Estrella AFTER December 19th (make sure to look to make sure it is an in person day for her!)  3.  Mammogram after visit with Estrella (can be right after appointment)    -[place]

## 2021-05-26 ENCOUNTER — RECORDS - HEALTHEAST (OUTPATIENT)
Dept: ADMINISTRATIVE | Facility: CLINIC | Age: 76
End: 2021-05-26

## 2021-05-26 DIAGNOSIS — N64.59 OTHER SIGNS AND SYMPTOMS IN BREAST: ICD-10-CM

## 2021-05-26 DIAGNOSIS — Z15.01 BRCA2 POSITIVE: Primary | ICD-10-CM

## 2021-05-26 DIAGNOSIS — Z12.31 ENCOUNTER FOR SCREENING MAMMOGRAM FOR MALIGNANT NEOPLASM OF BREAST: ICD-10-CM

## 2021-05-26 DIAGNOSIS — Z15.09 BRCA2 POSITIVE: Primary | ICD-10-CM

## 2021-05-27 ENCOUNTER — RECORDS - HEALTHEAST (OUTPATIENT)
Dept: ADMINISTRATIVE | Facility: CLINIC | Age: 76
End: 2021-05-27

## 2021-05-28 ENCOUNTER — RECORDS - HEALTHEAST (OUTPATIENT)
Dept: ADMINISTRATIVE | Facility: CLINIC | Age: 76
End: 2021-05-28

## 2021-05-30 VITALS — BODY MASS INDEX: 40.65 KG/M2 | WEIGHT: 244 LBS | HEIGHT: 65 IN

## 2021-05-31 VITALS — BODY MASS INDEX: 41.6 KG/M2 | WEIGHT: 251.9 LBS

## 2021-06-01 VITALS — HEIGHT: 66 IN | WEIGHT: 252 LBS | BODY MASS INDEX: 40.5 KG/M2

## 2021-06-01 VITALS — BODY MASS INDEX: 40.34 KG/M2 | HEIGHT: 66 IN | WEIGHT: 251 LBS

## 2021-06-01 VITALS — WEIGHT: 253.5 LBS | BODY MASS INDEX: 40.92 KG/M2

## 2021-06-01 VITALS — BODY MASS INDEX: 40.88 KG/M2 | WEIGHT: 253.3 LBS

## 2021-06-02 ENCOUNTER — RECORDS - HEALTHEAST (OUTPATIENT)
Dept: ADMINISTRATIVE | Facility: CLINIC | Age: 76
End: 2021-06-02

## 2021-06-02 NOTE — PROGRESS NOTES
[facility] Progress Note    Patient: Aida Alberts  MRN: [id number]  Date of Service: 10/10/2019        Reason for visit      1. Malignant neoplasm of upper-outer quadrant of right breast in female, estrogen receptor negative (H)        Assessment     1. A very pleasant 74 y.o.  woman with history of breast cancer diagnosed in  2003 status post lumpectomy, adjuvant chemotherapy and radiation therapy. This was triple negative so no hormone therapy was needed.  April 2018 MRI showed questionable shadow in the left breast.  Biopsy showed no evidence of any disease only some fibro-cystic changes and minimal proliferative changes.  Current mammogram is negative.  2. Found to be BRCA2 positive. Her one sister has been diagnosed with stage IV ovarian cancer. S/P b/l ADONAY and BSO.  3. History of smoking in the past.  4. Patient herself has no biological children.    Plan     1. Continue annual follow-ups.  We did talk about high risk genetics clinic at the [facility].  We will make a referral if she wants to go there.  2. Follow-up with me in a year.  3. Advised to find a primary care physician.  4. Good diet and exercise.  5. MRI breast in spring next year and then will see her back in about a year or so with a mammogram.    Clinical stage      Cancer of upper-outer quadrant of female breast, right    Primary site: Breast (Right)    Clinical free text: ER-ve,MT-ve,Ymp4yhx -ve    Clinical: Stage IIA (T1c, N1, cM0) - Signed by Laz Hernández MD on 7/29/2015    Summary: Stage IIA (T1c, N1, cM0)      History     Aida Alberts is a very pleasant 74 y.o. old female with a history of breast cancer diagnosed in 2003 located on the right side measuring 1.6 cm in size presenting as a mammogram otherwise completely asymptomatic for which she had lumpectomy and sentinel lymph node biopsy. [place] lymph node biopsy did show some positive cells on immunohistochemistry in her lymph node.  Subsequent to that she underwent adjuvant chemotherapy with a dose dense [place] and cyclophosphamide for 4 cycles. Then she went on to have radiation therapy. Then at [facility] they discovered that the lymph node might have been positive so she was given adjuvant Taxol therapy as well. This was triple negative type of breast cancer so no adjuvant hormonal therapy was given. She has since been followed between [facility] and her regular doctor. No evidence of any recurrence. In Summer of 2015 she was found to be positive for BRCA2 mutation. This came to light after her sister was diagnosed with ovarian cancer. She was tested and was found to be positive for BRCA2. Subsequent to that patient and her other siblings were also detected to be positive. Interestingly patient has 3 other unaffected siblings who are all positive as well.  Patient's father  of heart problems. One of his brother  of lung cancer. One of her nieces, sister's daughter was diagnosed with breast cancer at age 40. However she tested negative for BRCA2 mutation.    She was counseled to have her ovaries removed. In 2016 she underwent ADONAY with BSO. No malignancy was found. She recovering well from her surgery.     She had MRI of the breast done at [facility] in 2018.  That showed that behind the nipple on her left breast there were some shadows which was suspicious for malignancy.  She was recommended to have biopsy.  That biopsy was negative as well.  She comes in today for scheduled follow-up.  She did have a mammogram done last week.  She also had a CT scan of the chest.  Both of them were negative for any malignancy.  She has concerns and questions.  She did not get an MRI this year of her breast.    Past Medical History     Past Medical History:   Diagnosis Date     BRCA gene positive      Breast cancer (H)      Colon polyp     patient reports about 6 adenomas     Fibrocystic breast      Hx antineoplastic chemotherapy       Hx of radiation therapy      Hyperlipidemia        Review of Systems   Constitutional  Constitutional (WDL): All constitutional elements are within defined limits  Neurosensory  Neurosensory (WDL): Exceptions to WDL  Peripheral Sensory Neuropathy: Asymptomatic, loss of deep tendon reflexes or paresthesia  Cardiovascular  Cardiovascular (WDL): All cardiovascular elements are within defined limits  Pulmonary  Respiratory (WDL): Exceptions to WDL  Dyspnea: Shortness of breath with moderate exertion  Gastrointestinal  Gastrointestinal (WDL): All gastrointestinal elements are within defined limits  Genitourinary  Genitourinary (WDL): All genitourinary elements are within defined limits  Integumentary  Integumentary (WDL): All integumentary elements are within defined limits  Patient Coping  Patient Coping: Accepting  Accompanied by  Accompanied by: Alone    ECOG performance status and Distress Assessment      ECOG Performance:    ECOG Performance Status: 0    Distress Assessment  Distress Assessment Score: 2:     Pain Status  Currently in Pain: No/denies    Vital Signs     Vitals:    10/10/19 1419   BP: 118/59   Pulse: 75   Temp: 98.1  F (36.7  C)   SpO2: 95%       Physical Exam     GENERAL: no acute distress. Cooperative in conversation.   HEENT: pupils are equal, round and reactive. Oral mucosa is moist and intact.  RESP:Chest symmetric. Regular respiratory rate. No stridor.  ABD: Nondistended, soft.  EXTREMITIES: No lower extremity edema.   NEURO: non focal. Alert and oriented x3.   PSYCH: within normal limits. No depression or anxiety.  SKIN: warm dry intact     Lab Results     Results for orders placed or performed in visit on 06/08/18   Basic Metabolic Panel   Result Value Ref Range    Sodium 140 136 - 145 mmol/L    Potassium 4.9 3.5 - 5.0 mmol/L    Chloride 101 98 - 107 mmol/L    CO2 27 22 - 31 mmol/L    Anion Gap, Calculation 12 5 - 18 mmol/L    Glucose 74 70 - 125 mg/dL    Calcium 9.7 8.5 - 10.5 mg/dL     BUN 16 8 - 28 mg/dL    Creatinine 0.64 0.60 - 1.10 mg/dL    GFR MDRD Af Amer >60 >60 mL/min/1.73m2    GFR MDRD Non Af Amer >60 >60 mL/min/1.73m2   HM2(CBC w/o Differential)   Result Value Ref Range    WBC 10.1 4.0 - 11.0 thou/uL    RBC 4.89 3.80 - 5.40 mill/uL    Hemoglobin 15.3 12.0 - 16.0 g/dL    Hematocrit 44.6 35.0 - 47.0 %    MCV 91 80 - 100 fL    MCH 31.2 27.0 - 34.0 pg    MCHC 34.2 32.0 - 36.0 g/dL    RDW 11.5 11.0 - 14.5 %    Platelets 261 140 - 440 thou/uL    MPV 7.2 7.0 - 10.0 fL           Imaging Results     Mammo Screening Bilateral    Result Date: 10/7/2019  BILATERAL FULL FIELD DIGITAL SCREENING MAMMOGRAM Performed on: 10/7/19 Compared to: 09/08/2017 Mammo Screening Bilateral and 08/22/2016 Mammo Screening Bilateral Findings: The breasts are heterogeneously dense, which may obscure small masses. There are postsurgical lumpectomy changes in the right breast. No evidence for malignancy and no change from the previous exam(s). Continue routine screening mammogram as recommended. ACR BI-RADS Category 2: Benign Findings     Total time spent was 40 minutes, more than half of it was in face-to-face counseling regarding disease state, treatment, side effects and management.        Laz Hernández MD

## 2021-06-02 NOTE — PROGRESS NOTES
Pt arrived at [facility] to see Dr. Hernández. Pt has Breast Cancer and is here for f/u.

## 2021-06-02 NOTE — PROGRESS NOTES
S: Patient was seen in [place] to be measured for originally just a right breast form shaper. I will be getting an Rx from Kathya Zapata CNP for post-mastectomy bras in addition.    O: Goal is to evaluate and measure patient for a mastectomy garment that will provide her comfort and support. In addition, we will be getting her a right breast form shaper to provide shape and mimic her remaining natural breast.    A: She liked the  Massage bra in black, 103 [address]ali Contour bra in beige, and the 110 Leisure Bra in white. I will be ordering these in sizes 42C, 42D, 44C and C/D Large, C/D XL. I will place an order for the following breast form shapers in sizes 8-10: [address] [place], [address] Elliptical Lightweight, and the 76391 Massage Form Lightweight. Order submitted for fabrication to [employer].     P: I have the patient scheduled for a fitting appointment at the [facility] two weeks out on Wednesday, 11/6/19 at 3:00 pm.

## 2021-06-02 NOTE — PROGRESS NOTES
Met with Aida as she completed follow up appointment with Dr. Hernández today.  She is interested in partial prosthesis and bras to help with breast asymmetry. I gave her a list of bra/prosthesis suppliers in our area and recommended she see Rocio Hopkins at [facility].  Cira Smith RN

## 2021-06-03 VITALS
BODY MASS INDEX: 38.74 KG/M2 | DIASTOLIC BLOOD PRESSURE: 59 MMHG | HEART RATE: 75 BPM | OXYGEN SATURATION: 95 % | WEIGHT: 240 LBS | TEMPERATURE: 98.1 F | SYSTOLIC BLOOD PRESSURE: 118 MMHG

## 2021-06-03 NOTE — PROGRESS NOTES
"S: I have received Aida's Magali Contour #103 post-mastectomy bras in black and beige (sizes 46B). Rx on-file is current.    A: I assisted Aidain trying on the mastectomy bras. She liked these sizes better so exchanged her 44C #103 bra in beige for the beige one size 46B today. She went home with the #103 Magali Contour bra in black size 46B.     Aida also showed me her breast form and how she has been wearing it upside down which makes her look more symmetric. The thin \"top\" part of the form tends to roll over when this happens so we applied some microfiber fill which helped to keep it from folding under. I let her know even if it continues to fold, as long as it doesn't show through her bra or feel uncomfortable, I think this will be okay if it is staying in place. I let her know to keep putting it in its' cradle at night to help keep the crease out if possible. She is happy with this plan and will continue to wear the fiber fill between the thinner part of the form and the inner pocket of her bras.    P: She is to call with any further needs.  Goal is to maintain a home program.   "

## 2021-06-03 NOTE — PROGRESS NOTES
S: I have received Aida's mastectomy bras and breast form shapers. Rx on file is current.    A: I assisted Aida in trying on the mastectomy bras and breast forms. She found two bras that she really liked. They fit well and she stated they were comfortable. She went home with the ABC Magali Contour 103 44C in beige, the  Leisure bra XL, C/D in white, as well as the ABC Elliptical Lightweight shaper style [id number] in a size 6. She was instructed on the uses of the garments and how to care for the items.    The Magali Contour 103 bra seemed to be a little too snug in the band so I will be ordering in the same bra in sizes 44B in black and beige for her to try at the next appnt.    P: She is to call with any further needs. We did schedule an appnt two weeks out for 11/27 at 10:00 am in [place] for delivery of the new bras to try on.  Goal is to maintain a home program.

## 2021-06-08 NOTE — PROGRESS NOTES
Assessment:    Urticaria developed while sick and on cefuroxime.  Aida should be considered allergic to cefuroxime at this time, however hives did not develop immediately upon starting cefuroxime and symptoms consisted only of hives.  Most likely the hives are related to illness and not allergy.    History of recurrent illnesses in the late summer and fall.  Consider underlying allergic rhinitis predisposing towards seasonal sinusitis, however allergy testing from today is negative.    Plan:    Regarding antibiotic allergy.  Aida should be considered allergic to cefuroxime for now however I would further investigate.  I recommend waiting 6 weeks from the event and then do skin testing and challenge.  We will contact patient to set up after 6 weeks.  Until then, avoidance of all cephalosporin and penicillin antibiotics.    I would recommend starting fluticasone 1 spray each nostril twice daily August through November.  Also, continuing a pot use during that time.  I recommend keeping windows closed, frequent washing the bedding and p.m. showers during that time.  I consider getting an allergy encasement for the pillow or mattress.  ____________________________________________________________________________                                                             Aida is here for evaluation of recurrent rhinorrhea, congestion and sinus infections.  These typically happen between August and November every year.  Drainage to be thick at times.  Often she'll end up on antibiotics.  Last year she was started on a steroid antibiotic combination and this seemed to help so when she developed symptoms again this year she contacted her primary doctor.  Patient was given prescription for Medrol dose pack and cefuroxime 500 mg twice a day.  5-6 days into the course of the antibiotic patient developed diffuse hives.  She does have images of typical urticarial lesions.  She reports that this rash was all over her body  including arms and legs and back.  Every third she was seen and given triamcinolone ointment and prescription for hydroxyzine.  The rash lasted for approximately 1 week.  No report of wheezing, shortness of breath, vomiting or diarrhea.  Her symptoms of sinusitis are improved.  She does use an [place] pot with the symptoms as well.    Review of symptoms:  as above otherwise negative.    Past medical history: Breast cancer, arthritis, hyperlipidemia    Allergies: No known allergies to medications, latex , foods or hymenoptera venom.    Family history: A sister with a history of allergies.  Social history: Currently lives in an apartment that she describes as new.  She's been in for 1-1/2 years.  No visible water seepage or mold.  No pets in the home.  Patient was a previous cigarette smoker stopping 30 years ago she estimates that she smoked for 20 years less than one half pack per day.    Medications: Reviewed in chart.    Physical Exam:  General:  Alert and oriented.  Eyes:  Sclera clear.  Ears: TMs translucent grey with bony landmarks visible. Nose: Pale, boggy mucosal membranes.  Throat: Pink, mosit.  No lesions.  Neck: Supple.  No lymphadenopathy.  Lungs: CTA.  CV: Regular rate and rhythm. Extremities: Well perfused.  No clubbing or cyanosis. Skin: No rash.    Last Percutaneous Allergy Test Results  Trees  Leandro, White  1:20 H  (W/F in mm): 0/0 (02/15/17 1038)  Birch Mix 1:20 H (W/F in mm): 0/0 (02/15/17 1038)  Pender, Common 1:20 H (W/F in mm): 0/0 (02/15/17 1038)  Elm, American 1:20 H (W/F in mm): 0/0 (02/15/17 1038)  [place], Shagbark 1:20 H (W/F in mm): 0/0 (02/15/17 1038)  Maple, Hard/Sugar 1:20 H (W/F in mm): 0/0 (02/15/17 1038)  Ambrose Mix 1:20 H (W/F in mm): 0/0 (02/15/17 1038)  M[place]dale, Red 1:20 H (W/F in mm): 0/0 (02/15/17 1038)  Ramer, American 1:20 H (W/F in mm): 0/0 (02/15/17 1038)  [place] Tree 1:20 H (W/F in mm): 0/0 (02/15/17 1038)  Dust Mites  D. Pteronyssinus Mite 30,000 AU/ML H (W/F in mm):  0/0 (02/15/17 1038)  D. Farinae Mite 30,000 AU/ML H (W/F in mm: 0/0 (02/15/17 1038)  Grasses  Grass Mix #4 10,000 BAU/ML H: 0/0 (02/15/17 1038)  Darrin Grass 1:20 H (W/F in mm): 0/0 (02/15/17 1038)  Cockroach  Cockroach Mix 1:10 H (W/F in mm): 0/0 (02/15/17 1038)  Molds/Fungi  Alternaria Tenuis 1:10 H (W/F in mm): 0/0 (02/15/17 1038)  Aspergillus Fumigatus 1:10 H (W/F in mm): 0/0 (02/15/17 1038)  Homodendrum Cladosporioides 1:10 H (W/F in mm): 0/0 (02/15/17 1038)  Penicillin Notatum 1:10 H (W/F in mm): 0/0 (02/15/17 1038)  Epicoccum 1:10 H (W/F in mm): 0/0 (02/15/17 1038)  Weeds  Ragweed, Short 1:20 H (W/F in mm): 0/0 (02/15/17 1038)  Dock, Sorrel 1:20 H (W/F in mm): 0/0 (02/15/17 1038)  Lamb's Quarter 1:20 H (W/F in mm): 0/0 (02/15/17 1038)  Pigweed, Rough Red Root 1:20 H  (W/F in mm): 0/0 (02/15/17 1038)  Plantain, English 1:20 H  (W/F in mm): 0/0 (02/15/17 1038)  Sagebrush, Mugwort 1:20 H  (W/F in mm): 0/0 (02/15/17 1038)  Animal  Cat 10,000 BAU/ML H (W/F in mm): 0/0 (02/15/17 1038)  Dog 1:10 H (W/F in mm): 0/0 (02/15/17 1038)  Controls  Device Type: QUINTIP (02/15/17 1038)  Neg. control: 50% Glycerine/Saline H (W/F in mm): 0/0 (02/15/17 1038)  Pos. control: Histamine 6mg/ML (W/F in mms): 5/F (02/15/17 1038)     This transcription uses voice recognition software, which may contain typographical errors.

## 2021-06-09 NOTE — PROGRESS NOTES
Assessment and Plan:       1. Mixed hyperlipidemia  She remains on a statin.  Will check labs today.  - Basic Metabolic Panel  - HM2(CBC w/o Differential)  - Thyroid Stimulating Hormone (TSH)  - Hepatic Profile  - Lipid Cascade  - Urinalysis-UC if Indicated    2. Vitamin D deficiency  Check a level today.  - Vitamin D, Total (25-Hydroxy)    3. Menopause  She is due for a bone density scan.    4.  Breast cancer  We reviewed her previous MRI scan which is level 2, benign.      The patient's current medical problems were reviewed.    The following high BMI interventions were performed this visit: encouragement to exercise and weight loss from baseline weight  The following health maintenance schedule was reviewed with the patient and provided in printed form in the after visit summary:   Health Maintenance   Topic Date Due     ZOSTER VACCINE  07/17/2005     DXA SCAN  07/17/2010     MAMMOGRAM  08/29/2017     FALL RISK ASSESSMENT  04/06/2018     ADVANCE DIRECTIVES DISCUSSED WITH PATIENT  12/26/2019     TD 18+ HE  11/09/2022     COLONOSCOPY  10/17/2026     PNEUMOCOCCAL POLYSACCHARIDE VACCINE AGE 65 AND OVER  Completed     INFLUENZA VACCINE RULE BASED  Completed     PNEUMOCOCCAL CONJUGATE VACCINE FOR ADULTS (PCV13 OR PREVNAR)  Completed        Subjective:   Chief Complaint: Aida Alberts is an 71 y.o. female here for an Annual Wellness visit.   HPI: Aida comes in today for her general checkup.  She is feeling fairly well but has questions with regards to her previous MRI of the breast which was done at [facility] and she never heard the results.  She would like to try to lose weight.  She has been having increased problems with knee pain and is seeing ortho.    Review of Systems:   Please see above.  The rest of the review of systems are negative for all systems.    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     Past Medical History:   Diagnosis Date     Breast cancer      Colon polyp     patient reports about 6 adenomas     Fibrocystic breast      Hx antineoplastic chemotherapy      Hx of radiation therapy      Hyperlipidemia       Past Surgical History:   Procedure Laterality Date     BREAST LUMPECTOMY       COLONOSCOPY       HYSTERECTOMY  2016     OOPHORECTOMY       IL EXCISE BREAST CYST      Description: Breast Surgery Lumpectomy;  Recorded: 2011;  Comments: Breast cancer,       Family History   Problem Relation Age of Onset     Ovarian cancer Sister 77     endometrioid, grade 2     BRCA 1/2 Sister      BRCA2 positive, del exons 5-7, [facility]     Heart failure Mother       age 96     Heart disease Father       in his 60s     BRCA 1/2 Brother      BRCA 1/2 Sister      BRCA 1/2 Sister      No Medical Problems Son      No Medical Problems Daughter       Social History     Social History     Marital status:      Spouse name: N/A     Number of children: N/A     Years of education: N/A     Occupational History     Not on file.     Social History Main Topics     Smoking status: Former Smoker     Quit date: 2013     Smokeless tobacco: Never Used      Comment: 1 pack a week until      Alcohol use 4.2 oz/week     7 Glasses of wine per week     Drug use: No     Sexual activity: No     Other Topics Concern     Not on file     Social History Narrative    She owns a marketing company. She has 2 adopted children and 2 grandchildren and was  in  after 44 years of marriage (he is bipolar).  She lives alone in an apartment.      Current Outpatient Prescriptions   Medication Sig  "Dispense Refill     acetaminophen (TYLENOL) 325 MG tablet Take 325-650 mg by mouth every 4 (four) hours as needed.        diazePAM (VALIUM) 5 MG tablet Take 1-2 tablets (5-10 mg total) by mouth once as needed for anxiety or sleep (prior to MRI). 10 tablet 0     MAGNESIUM ORAL Take 1 tablet by mouth every evening. OTC - unsure of strength       simvastatin (ZOCOR) 40 MG tablet Take 1 tablet (40 mg total) by mouth at bedtime. 90 tablet 3     triamcinolone (KENALOG) 0.1 % cream Apply as  Needed for itching 80 g 0     zolpidem (AMBIEN) 5 MG tablet Take 1 tablet (5 mg total) by mouth at bedtime as needed for sleep. 30 tablet 0     No current facility-administered medications for this visit.       Objective:   Vital Signs:   Visit Vitals     /80     Pulse 78     Resp 12     Ht 5' 5.25\" (1.657 m)     Wt (!) 244 lb (110.7 kg)     Breastfeeding No     BMI 40.29 kg/m2        VisionScreening:  No exam data present     PHYSICAL EXAM  Wt Readings from Last 3 Encounters:   04/06/17 (!) 244 lb (110.7 kg)   10/06/16 (!) 241 lb 9.6 oz (109.6 kg)   04/25/16 (!) 254 lb 8 oz (115.4 kg)     General Appearance: Pleasant and alert  HEENT: Sclera are clear, tympanic membranes clear  Lungs: Normal respirations, clear to auscultation  Breasts: Normal-appearing breasts and nipples with no axillary adenopathy   Cardiac: Regular rate and rhythm with no appreciable murmur rub or gallop   Abdomen: Soft and nondistended  Extremities: No edema  Skin: No rashes  Neuro: Moves all extremities and has facial symmetry  Gait: Ambulates with a normal gait    Personalized prevention plan: Lifestyle interventions to promote self-management and wellness were discussed including good nutrition, ongoing physical activity, falls prevention and continuing with screening tests as recommended including mammograms and/or MRI scans as recommended by oncology, bone density scan and her colonoscopy was last October.  And those listed in the health maintenance " plan listed above.    Much or all of the text in this note was generated through the use of Dragon Dictate voice-to-text software. Errors in spelling or words which seem out of context are unintentional. Sound alike errors, in particular, may have escaped editing.      Assessment Results 4/6/2017   Instrumental Activities of Daily Living No help needed   Get Up and Go Score Less than 12 seconds   Mini Cog Total Score 4     A Mini-Cog score of 0-2 suggests the possibility of dementia, score of 3-5 suggests no dementia    Identified Health Risks:     The patient was provided with written information regarding signs of hearing loss.  Information regarding advance directives (living antonio), including where she can download the appropriate form, was provided to the patient via the AVS.

## 2021-06-10 NOTE — PROGRESS NOTES
Audiology only; referred by Sharmin Mckinnon    History:  Patient's adult children are concerned for her hearing ability; a friend noticed she was leaning in closer to hear while at a restaurant together. She denied tinnitus, vertigo, otalgia, aural fullness, or noise exposure. She reported being diagnosed with pneumonia last week and is currently being treated with antibiotics.    Results:  Otoscopy revealed a partial cerumen occlusion in the left ear; a portion of the left tympanic membrane was visible. Right canal was clear.  Hearing sensitivity was assessed with good reliability using circumaural phones.      Right ear:   Normal hearing sensitivity for 250-4000 Hz, sloping to mild-moderate, likely sensorineural hearing loss for 0009-9034 Hz.    Left ear:  Normal hearing sensitivity for 250-2000 Hz, sloping to mild-moderate mixed hearing loss for 8278-2515 Hz.   Hearing asymmetry was noted between ears; Svitlana was negative at 6000 Hz.    Speech reception thresholds showed agreement with pure tone findings in each ear. Word recognition ability was excellent for the left ear; good for the right ear, with presentation levels at typical conversational volume in both ears.    Tympanometry was consistent with normal middle ear function, bilaterally.    Recommendations:  Follow-up with PCP; recommend ENT referral due to asymmetry. Retest hearing annually (to monitor) or per medical management.  Wear hearing protection consistently in noise.     Yony Santizo, [facility]  [place] Licensed Audiologist [id number]

## 2021-06-12 NOTE — TELEPHONE ENCOUNTER
Pt called back returning call to Schedule  Annual clinic visit with Dr Hernández.   Patient stated she will schedule her mammo first them once she has a day she will us to get her appointment with Dr Hernández.

## 2021-06-13 NOTE — PROGRESS NOTES
[facility] Progress Note    Patient: Aida Alberts  MRN: [id number]  Date of Service: 10/16/2017        Reason for visit      1. Malignant neoplasm of upper-outer quadrant of right breast in female, estrogen receptor negative    2. BRCA2 positive    3. 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 Benign neoplasm of sigmoid colon        Assessment     1. A very pleasant 72 y.o.  woman with history of breast cancer diagnosed in  2003 status post lumpectomy, adjuvant chemotherapy and radiation therapy. This was triple negative so no hormone therapy was needed.   2. Found to be BRCA2 positive. Her one sister has been diagnosed with stage IV ovarian cancer. S/P b/l ADONAY and BSO.  3. History of smoking in the past.  4. Patient herself has no biological children.    Plan     1. Twice a year screening with MRI alternating with Mammogram. Next MRI in Feb and mammogram in August.  2. Twice a year clinical breast exam in April and October.  3. Advised to see Dr. Mckinnon for advise regarding coronary calcification.  4. Good diet and exercise.  5. CT chest biennially for smoking history.     Clinical stage      Cancer of upper-outer quadrant of female breast, right    Primary site: Breast (Right)    Clinical free text: ER-ve,IN-ve,Gbz2wcu -ve    Clinical: Stage IIA (T1c, N1, cM0) - Signed by Laz Hernández MD on 7/29/2015    Summary: Stage IIA (T1c, N1, cM0)      History     Aida Alberts is a very pleasant 72 y.o. old female with a history of breast cancer diagnosed in 2003 located on the right side measuring 1.6 cm in size presenting as a mammogram otherwise completely asymptomatic for which she had lumpectomy and sentinel lymph node biopsy. [place] lymph node biopsy did show some positive cells on immunohistochemistry in her lymph node. Subsequent to that she underwent adjuvant chemotherapy with a dose dense [place] and cyclophosphamide for 4 cycles. Then she went on to have radiation therapy. Then at  [facility] they discovered that the lymph node might have been positive so she was given adjuvant Taxol therapy as well. This was triple negative type of breast cancer so no adjuvant hormonal therapy was given. She has since been followed between [facility] and her regular doctor. No evidence of any recurrence. In Summer of 2015 she was found to be positive for BRCA2 mutation. This came to light after her sister was diagnosed with ovarian cancer. She was tested and was found to be positive for BRCA2. Subsequent to that patient and her other siblings were also detected to be positive. Interestingly patient has 3 other unaffected siblings who are all positive as well.  Patient's father  of heart problems. One of his brother  of lung cancer. One of her nieces, sister's daughter was diagnosed with breast cancer at age 40. However she tested negative for BRCA2 mutation.    She was counseled to have her ovaries removed. In 2016 she underwent ADONAY with BSO. No malignancy was found. She recovering well from her surgery.     Comes in today for scheduled follow up.    Past Medical History     Past Medical History:   Diagnosis Date     BRCA gene positive      Breast cancer      Colon polyp     patient reports about 6 adenomas     Fibrocystic breast      Hx antineoplastic chemotherapy      Hx of radiation therapy      Hyperlipidemia        Review of Systems   Constitutional  Constitutional (WDL): Exceptions to WDL  Weight Gain: 5 - <10% from baseline (up 7 lbs)  Neurosensory  Neurosensory (WDL): Exceptions to WDL  Cardiovascular  Cardiovascular (WDL): Exceptions to WDL  Edema: Yes  Edema Limbs: 5 - 10% inter-limb discrepancy in volume or circumference at point of greatest visible difference, swelling or obscuration of anatomic architecture on close inspection (ankles)  Pulmonary  Respiratory (WDL): Exceptions to WDL  Dyspnea: Shortness of breath with minimal exertion, limiting instrumental  ADL  Gastrointestinal  Gastrointestinal (WDL): All gastrointestinal elements are within defined limits  Genitourinary  Genitourinary (WDL): All genitourinary elements are within defined limits  Integumentary  Integumentary (WDL): All integumentary elements are within defined limits  Patient Coping  Patient Coping: Accepting  Accompanied by  Accompanied by: Alone    ECOG performance status and Distress Assessment      ECOG Performance:    ECOG Performance Status: 0    Distress Assessment  Distress Assessment Score: No distress:     Pain Status  Currently in Pain: No/denies    Vital Signs     Vitals:    10/16/17 1509   BP: 133/62   Pulse: 79   Temp: 98.1  F (36.7  C)   SpO2: 96%       Physical Exam     GENERAL: No acute distress. Cooperative in conversation.   HEENT: Pupils are equal, round and reactive. Oral mucosa is clean and intact. No ulcerations or mucositis noted. No bleeding noted.  RESP:Chest symmetric lungs are clear bilaterally per auscultation. Regular respiratory rate. No wheezes or rhonchi.  CV: Normal S1 S2 Regular, rate and rhythm. No murmurs.  ABD: Nondistended, soft, nontender. Positive bowel sounds. No organomegaly.   EXTREMITIES: No lower extremity edema.   NEURO: Non- focal. Alert and oriented x3.  Cranial nerves appear intact.  PSYCH: Within normal limits. No depression or anxiety.  SKIN: Warm dry intact.    LYMPH NODES: Bilateral cervical, supraclavicular, axillary lymph node examination was done.  Negative for any palpable adenopathy.    Lab Results     Results for orders placed or performed in visit on 10/16/17   Comprehensive Metabolic Panel   Result Value Ref Range    Sodium 138 136 - 145 mmol/L    Potassium 4.8 3.5 - 5.0 mmol/L    Chloride 101 98 - 107 mmol/L    CO2 28 22 - 31 mmol/L    Anion Gap, Calculation 9 5 - 18 mmol/L    Glucose 90 70 - 125 mg/dL    BUN 16 8 - 28 mg/dL    Creatinine 0.74 0.60 - 1.10 mg/dL    GFR MDRD Af Amer >60 >60 mL/min/1.73m2    GFR MDRD Non Af Amer >60 >60  mL/min/1.73m2    Bilirubin, Total 0.4 0.0 - 1.0 mg/dL    Calcium 9.4 8.5 - 10.5 mg/dL    Protein, Total 7.5 6.0 - 8.0 g/dL    Albumin 3.8 3.5 - 5.0 g/dL    Alkaline Phosphatase 96 45 - 120 U/L    AST 22 0 - 40 U/L    ALT 22 0 - 45 U/L   HM1 (CBC with Diff)   Result Value Ref Range    WBC 8.7 4.0 - 11.0 thou/uL    RBC 4.79 3.80 - 5.40 mill/uL    Hemoglobin 14.9 12.0 - 16.0 g/dL    Hematocrit 43.7 35.0 - 47.0 %    MCV 91 80 - 100 fL    MCH 31.1 27.0 - 34.0 pg    MCHC 34.1 32.0 - 36.0 g/dL    RDW 13.1 11.0 - 14.5 %    Platelets 245 140 - 440 thou/uL    MPV 9.6 8.5 - 12.5 fL    Neutrophils % 60 50 - 70 %    Lymphocytes % 28 20 - 40 %    Monocytes % 9 2 - 10 %    Eosinophils % 3 0 - 6 %    Basophils % 1 0 - 2 %    Neutrophils Absolute 5.1 2.0 - 7.7 thou/uL    Lymphocytes Absolute 2.4 0.8 - 4.4 thou/uL    Monocytes Absolute 0.8 0.0 - 0.9 thou/uL    Eosinophils Absolute 0.2 0.0 - 0.4 thou/uL    Basophils Absolute 0.1 0.0 - 0.2 thou/uL         Imaging Results     Ct Chest Without Contrast    Result Date: 10/9/2017  CT CHEST WO CONTRAST 10/9/2017 6:06 PM INDICATION: Tobacco use. Right breast cancer. TECHNIQUE: Routine chest. Dose reduction techniques were used. IV CONTRAST: Nonenhanced CT. COMPARISON: 9/2/2015. FINDINGS: LUNGS AND PLEURA: No pulmonary nodules. No pleural effusions. MEDIASTINUM: Left supraclavicular lymph node 1.2 x 0.9 cm image 1. Coronary artery calcifications in the LAD and right coronary artery. No mediastinal adenopathy. No pericardial effusion. LIMITED UPPER ABDOMEN: Negative. MUSCULOSKELETAL: Negative.     CONCLUSION: 1.  No pleural effusions and no pulmonary nodules. 2.  Coronary artery calcifications.    I reviewed her mammography results from August.  Also reviewed her MRI report from March.    Laz Hernández MD

## 2021-06-13 NOTE — PROGRESS NOTES
S: Aida would like to reorder three post-mastectomy bras: Magali Contour #103 in black and beige, as well as the ABC Leisure #110 bra in black. RX has been requested for renewal.    A: Order was submitted for fabrication to [facility].    P: Upon receiving her garments, they will be shipped to her home address, which was verified.

## 2021-06-13 NOTE — PROGRESS NOTES
S: Aida's ABC post-mastectomy bras (three of them) arrived to . RX on-file is current from Kathya Zapata CNP.    A: No assessment was made. Items will be shipped to her home address, along with proper compliance paperwork to be signed, dated, and returned.    P: She is to call with any further needs.  Goal is to maintain a home program.

## 2021-06-14 NOTE — PROGRESS NOTES
Aida Alberts is a 75 y.o. female who is being evaluated via a billable video visit.      How would you like to obtain your AVS? MyChart.  If dropped from the video visit, the video invitation should be resent by: Text to cell phone: [phone]  Will anyone else be joining your video visit? No        Subjective     Aida Alberts is 75 y.o. and presents to clinic today for the following health issues   HPI           Video-Visit Details    Type of service:  Video Visit    Originating Location (pt. Location): Home    Distant Location (provider location):  [facility]     Platform used for Video Visit: Kuznech

## 2021-06-15 DIAGNOSIS — F41.9 ANXIETY: ICD-10-CM

## 2021-06-15 RX ORDER — LORAZEPAM 1 MG/1
TABLET ORAL
Qty: 1 TABLET | Refills: 0 | Status: SHIPPED | OUTPATIENT
Start: 2021-06-15 | End: 2021-07-22

## 2021-06-16 NOTE — PROGRESS NOTES
Assessment and Plan:   She has had some sad family news recently due to sister's recent diagnosis of metastatic uterine cancer and hypertrophic cardiomyopathy.    1. Encounter for general adult medical examination with abnormal findings  She is due for her MRI scan due to her BRCA2 positivity.  Density scans recommended eventually.    2. Mixed hyperlipidemia  Check labs.  - Basic Metabolic Panel  - HM2(CBC w/o Differential)  - Thyroid Stimulating Hormone (TSH)  - Hepatic Profile  - Lipid Cascade  - Urinalysis-UC if Indicated    3. Malignant neoplasm of upper-outer quadrant of right breast in female, estrogen receptor negative  Follow closely by Dr. Hernández.    4. Vitamin D deficiency  - Vitamin D, Total (25-Hydroxy)    5. Menopause  - DXA Bone Density Scan; Future    6. Dyspnea, unspecified type  Her sister's history and chronic shortness of breath, will go ahead and do a baseline echocardiogram.  She had a MUGA scan years ago prior to her chemotherapy for her breast cancer.  She had an EKG in 2016 that was relatively normal.  - Echo Complete; Future     The patient's current medical problems were reviewed.    The following health maintenance schedule was reviewed with the patient and provided in printed form in the after visit summary:   Health Maintenance   Topic Date Due     ZOSTER VACCINE  07/17/2005     DXA SCAN  07/17/2010     FALL RISK ASSESSMENT  04/06/2018     MAMMOGRAM  09/08/2018     ADVANCE DIRECTIVES DISCUSSED WITH PATIENT  12/26/2019     TD 18+ HE  11/09/2022     COLONOSCOPY  11/17/2027     PNEUMOCOCCAL POLYSACCHARIDE VACCINE AGE 65 AND OVER  Completed     INFLUENZA VACCINE RULE BASED  Completed     PNEUMOCOCCAL CONJUGATE VACCINE FOR ADULTS (PCV13 OR PREVNAR)  Completed        Subjective:   Chief Complaint: Aida Alberts is an 72 y.o. female here for an Annual Wellness visit.   HPI: Aida is doing fairly well.  She recently spent 2 weeks in [employer].  Her sister has been diagnosed with  metastatic uterine cancer which they feel is genetic and related to ovarian cancer.  She also had an alcohol ablation due to hypertrophic cardiomyopathy.    Review of Systems:    Please see above.  The rest of the review of systems are negative for all systems.    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     Past Medical History:   Diagnosis Date     BRCA gene positive      Breast cancer      Colon polyp     patient reports about 6 adenomas     Fibrocystic breast      Hx antineoplastic chemotherapy      Hx of radiation therapy      Hyperlipidemia       Past Surgical History:   Procedure Laterality Date     BREAST LUMPECTOMY       COLONOSCOPY       HYSTERECTOMY  2016     OOPHORECTOMY       FL EXCISE BREAST CYST      Description: Breast Surgery Lumpectomy;  Recorded: 2011;  Comments: Breast cancer,       Family History   Problem Relation Age of Onset     Ovarian cancer Sister 77     endometrioid, grade 2     BRCA 1/2 Sister      BRCA2 positive, del exons 5-7, [employer] [facility]     Heart failure Mother       age 96     Heart disease Father       in his 60s     BRCA 1/2 Brother      BRCA 1/2 Sister      BRCA 1/2 Sister      Heart disease Sister      HOCM     Uterine cancer Sister 76     No Medical Problems Son      No Medical Problems Daughter       Social History     Social History     Marital status:      Spouse name: N/A     Number of children: N/A     Years of education: N/A     Occupational History     Not on file.     Social History Main Topics     Smoking status: Former Smoker     Quit  "date: 12/26/2013     Smokeless tobacco: Never Used      Comment: 1 pack a week until 2013     Alcohol use 4.2 oz/week     7 Glasses of wine per week     Drug use: No     Sexual activity: No     Other Topics Concern     Not on file     Social History Narrative    She owns a marketing company. She has 2 adopted children and 2 grandchildren and was  in 2014 after 44 years of marriage (he is bipolar).  She lives alone in an apartment.      Current Outpatient Prescriptions   Medication Sig Dispense Refill     acetaminophen (TYLENOL) 325 MG tablet Take 325-650 mg by mouth every 4 (four) hours as needed.        cholecalciferol, vitamin D3, (VITAMIN D3) 2,000 unit capsule Take 2,000 Units by mouth daily.       diazePAM (VALIUM) 5 MG tablet Take 1-2 tablets (5-10 mg total) by mouth once as needed for anxiety or sleep (prior to MRI). 10 tablet 0     MAGNESIUM ORAL Take 1 tablet by mouth every evening. OTC - unsure of strength       rosuvastatin (CRESTOR) 10 MG tablet Take 1 tablet (10 mg total) by mouth at bedtime. 90 tablet 11     zolpidem (AMBIEN) 5 MG tablet Take 1 tablet (5 mg total) by mouth at bedtime as needed for sleep. 30 tablet 0     fluticasone (FLONASE) 50 mcg/actuation nasal spray 2 sprays into each nostril daily. 10 g 11     No current facility-administered medications for this visit.       Objective:   Vital Signs:   Visit Vitals     /76 (Patient Site: Left Arm, Patient Position: Sitting, Cuff Size: Adult Large)     Pulse 80     Ht 5' 6\" (1.676 m)     Wt (!) 252 lb (114.3 kg)     SpO2 97%     BMI 40.67 kg/m2        VisionScreening:  No exam data present     PHYSICAL EXAM  Wt Readings from Last 3 Encounters:   03/01/18 (!) 252 lb (114.3 kg)   10/16/17 (!) 251 lb 14.4 oz (114.3 kg)   04/06/17 (!) 244 lb (110.7 kg)     General Appearance: Pleasant and alert  HEENT: Sclera are clear, tympanic membranes clear  Lungs: Normal respirations, clear to auscultation  Breasts: Normal-appearing breasts and " nipples with no axillary adenopathy   Cardiac: Regular rate and rhythm with no appreciable murmur rub or gallop   Abdomen: Soft and nondistended  Extremities: No edema  Skin: No rashes  Neuro: Moves all extremities and has facial symmetry  Gait: Ambulates with a normal gait    Personalized prevention plan: Lifestyle interventions to promote self-management and wellness were discussed including good nutrition, ongoing physical activity, falls prevention and continuing with screening tests as recommended including density scan and those listed in the health maintenance plan listed above.    Much or all of the text in this note was generated through the use of Dragon Dictate voice-to-text software. Errors in spelling or words which seem out of context are unintentional. Sound alike errors, in particular, may have escaped editing.      Assessment Results 3/1/2018   Activities of Daily Living No help needed   Instrumental Activities of Daily Living No help needed   Get Up and Go Score -   Mini Cog Total Score 5   Some recent data might be hidden     A Mini-Cog score of 0-2 suggests the possibility of dementia, score of 3-5 suggests no dementia    Identified Health Risks:     She is at risk for lack of exercise and has been provided with information to increase physical activity for the benefit of her well-being.  The patient was provided with written information regarding signs of hearing loss.  Information regarding advance directives (living antonio), including where she can download the appropriate form, was provided to the patient via the AVS.

## 2021-06-17 NOTE — PROGRESS NOTES
[facility] Progress Note    Patient: Aida Alberts  MRN: [id number]  Date of Service: 4/30/2018        Reason for visit      1. Malignant neoplasm of upper-outer quadrant of right breast in female, estrogen receptor negative        Assessment     1. A very pleasant 72 y.o.  woman with history of breast cancer diagnosed in  2003 status post lumpectomy, adjuvant chemotherapy and radiation therapy. This was triple negative so no hormone therapy was needed.  Now the MRI showing questionable shadow in the left breast.  Needs biopsy.  2. Found to be BRCA2 positive. Her one sister has been diagnosed with stage IV ovarian cancer. S/P b/l ADONAY and BSO.  3. History of smoking in the past.  4. Patient herself has no biological children.    Plan     1. I had lengthy discussion with the patient.  Reviewed the MRI images with her.  Recommended due to her genetics as well as what the MRI findings are it is advisable that she should consider biopsy.  This should be done ideally under MRI guidance.  2. Follow-up with me after that..  3. Advised to see Dr. Mckinnon for her primary care.  4. Good diet and exercise.  5. CT chest biennially for smoking history.     Clinical stage      Cancer of upper-outer quadrant of female breast, right    Primary site: Breast (Right)    Clinical free text: ER-ve,NV-ve,[id number] -ve    Clinical: Stage IIA (T1c, N1, cM0) - Signed by Laz Hernández MD on 7/29/2015    Summary: Stage IIA (T1c, N1, cM0)      History     Aida Alberts is a very pleasant 72 y.o. old female with a history of breast cancer diagnosed in 2003 located on the right side measuring 1.6 cm in size presenting as a mammogram otherwise completely asymptomatic for which she had lumpectomy and sentinel lymph node biopsy. [place] lymph node biopsy did show some positive cells on immunohistochemistry in her lymph node. Subsequent to that she underwent adjuvant chemotherapy with a dose dense [place] and  cyclophosphamide for 4 cycles. Then she went on to have radiation therapy. Then at [facility] they discovered that the lymph node might have been positive so she was given adjuvant Taxol therapy as well. This was triple negative type of breast cancer so no adjuvant hormonal therapy was given. She has since been followed between [facility] and her regular doctor. No evidence of any recurrence. In Summer of 2015 she was found to be positive for BRCA2 mutation. This came to light after her sister was diagnosed with ovarian cancer. She was tested and was found to be positive for BRCA2. Subsequent to that patient and her other siblings were also detected to be positive. Interestingly patient has 3 other unaffected siblings who are all positive as well.  Patient's father  of heart problems. One of his brother  of lung cancer. One of her nieces, sister's daughter was diagnosed with breast cancer at age 40. However she tested negative for BRCA2 mutation.    She was counseled to have her ovaries removed. In 2016 she underwent ADONAY with BSO. No malignancy was found. She recovering well from her surgery.     She had MRI of the breast done at [facility].  That showed that behind the nipple on her left breast there were some shadows which was suspicious for malignancy.  She has been recommended to have biopsy.  She is here to discuss that.  She is not having any symptoms.  She is obviously anxious after finding it out.    Past Medical History     Past Medical History:   Diagnosis Date     BRCA gene positive      Breast cancer      Colon polyp     patient reports about 6 adenomas     Fibrocystic breast      Hx antineoplastic chemotherapy      Hx of radiation therapy      Hyperlipidemia        Review of Systems   Constitutional  Constitutional (WDL): Exceptions to WDL  Fatigue: Fatigue relieved by rest (works full-time)  Neurosensory  Neurosensory (WDL): Exceptions to WDL  Peripheral Sensory Neuropathy:  Asymptomatic, loss of deep tendon reflexes or paresthesia (positional in left hand)  Cardiovascular  Cardiovascular (WDL): Exceptions to WDL  Edema: Yes  Edema Limbs: 5 - 10% inter-limb discrepancy in volume or circumference at point of greatest visible difference, swelling or obscuration of anatomic architecture on close inspection (worse in the heat LT>RT)  Pulmonary  Respiratory (WDL): Within Defined Limits  Gastrointestinal  Gastrointestinal (WDL): All gastrointestinal elements are within defined limits  Genitourinary  Genitourinary (WDL): All genitourinary elements are within defined limits  Integumentary  Integumentary (WDL): All integumentary elements are within defined limits (on antibiotic for a cyst under her rt arm)  Patient Coping  Patient Coping: Accepting  Accompanied by  Accompanied by: Alone    ECOG performance status and Distress Assessment      ECOG Performance:    ECOG Performance Status: 0    Distress Assessment  Distress Assessment Score: 7 (being here):     Pain Status  Currently in Pain: No/denies    Vital Signs     Vitals:    04/30/18 1513   BP: 148/66   Pulse: 87   Temp: 98.5  F (36.9  C)   SpO2: 96%       Physical Exam     GENERAL: no acute distress. Cooperative in conversation.   HEENT: pupils are equal, round and reactive. Oral mucosa is moist and intact.  RESP:Chest symmetric. Regular respiratory rate. No stridor.  ABD: Nondistended, soft.  EXTREMITIES: No lower extremity edema.   NEURO: non focal. Alert and oriented x3.   PSYCH: within normal limits. No depression or anxiety.  SKIN: warm dry intact     Lab Results     Results for orders placed or performed in visit on 03/01/18   Basic Metabolic Panel   Result Value Ref Range    Sodium 138 136 - 145 mmol/L    Potassium 5.0 3.5 - 5.0 mmol/L    Chloride 102 98 - 107 mmol/L    CO2 26 22 - 31 mmol/L    Anion Gap, Calculation 10 5 - 18 mmol/L    Glucose 90 70 - 125 mg/dL    Calcium 9.5 8.5 - 10.5 mg/dL    BUN 16 8 - 28 mg/dL    Creatinine  0.65 0.60 - 1.10 mg/dL    GFR MDRD Af Amer >60 >60 mL/min/1.73m2    GFR MDRD Non Af Amer >60 >60 mL/min/1.73m2   HM2(CBC w/o Differential)   Result Value Ref Range    WBC 5.9 4.0 - 11.0 thou/uL    RBC 5.08 3.80 - 5.40 mill/uL    Hemoglobin 15.5 12.0 - 16.0 g/dL    Hematocrit 45.9 35.0 - 47.0 %    MCV 90 80 - 100 fL    MCH 30.6 27.0 - 34.0 pg    MCHC 33.8 32.0 - 36.0 g/dL    RDW 11.3 11.0 - 14.5 %    Platelets 243 140 - 440 thou/uL    MPV 7.4 7.0 - 10.0 fL   Thyroid Stimulating Hormone (TSH)   Result Value Ref Range    TSH 4.13 0.30 - 5.00 uIU/mL   Hepatic Profile   Result Value Ref Range    Bilirubin, Total 0.4 0.0 - 1.0 mg/dL    Bilirubin, Direct 0.1 <=0.5 mg/dL    Protein, Total 7.3 6.0 - 8.0 g/dL    Albumin 3.9 3.5 - 5.0 g/dL    Alkaline Phosphatase 100 45 - 120 U/L    AST 28 0 - 40 U/L    ALT 43 0 - 45 U/L   Lipid Cascade   Result Value Ref Range    Cholesterol 250 (H) <=199 mg/dL    Triglycerides 197 (H) <=149 mg/dL    HDL Cholesterol 51 >=50 mg/dL    LDL Calculated 160 (H) <=129 mg/dL    Patient Fasting > 8hrs? Yes    Urinalysis-UC if Indicated   Result Value Ref Range    Color, UA Yellow Colorless, Yellow, Straw, Light Yellow    Clarity, UA Clear Clear    Glucose, UA Negative Negative    Bilirubin, UA Negative Negative    Ketones, UA Negative Negative    Specific Gravity, UA 1.025 1.005 - 1.030    Blood, UA Trace (!) Negative    pH, UA 5.5 5.0 - 8.0    Protein, UA Trace (!) Negative mg/dL    Urobilinogen, UA 0.2 E.U./dL 0.2 E.U./dL, 1.0 E.U./dL    Nitrite, UA Negative Negative    Leukocytes, UA Negative Negative   Vitamin D, Total (25-Hydroxy)   Result Value Ref Range    Vitamin D, Total (25-Hydroxy) 38.2 30.0 - 80.0 ng/mL         Imaging Results     No results found.  I reviewed her MRI report and images.  Total time spent was 40 minutes, more than half of it was in face-to-face counseling regarding disease state, treatment, side effects and management.      Laz Hernández MD

## 2021-06-18 NOTE — PROGRESS NOTES
[facility] Progress Note    Patient: Aida Alberts  MRN: [id number]  Date of Service: 5/16/2018        Reason for visit      1. Malignant neoplasm of upper-outer quadrant of right female breast    2. BRCA2 positive        Assessment     1. A very pleasant 72 y.o.  woman with history of breast cancer diagnosed in  2003 status post lumpectomy, adjuvant chemotherapy and radiation therapy. This was triple negative so no hormone therapy was needed.  April 2018 MRI showed questionable shadow in the left breast.  Biopsy showed no evidence of any disease only some fibro-cystic changes and minimal proliferative changes.  2. Found to be BRCA2 positive. Her one sister has been diagnosed with stage IV ovarian cancer. S/P b/l ADONAY and BSO.  3. History of smoking in the past.  4. Patient herself has no biological children.    Plan     1. I had lengthy discussion with the patient.  Reviewed the labs report with her.  This is suggestive that she has no precancerous or worrisome lesion.  She should continue with the follow-up plan that she has set up before which is her yearly MRI as well as yearly breast exams.  2. Follow-up with me in August next year.  3. Advised to see Dr. Mckinnon for her primary care at least once a year.  4. Good diet and exercise.  5. CT chest biennially for smoking history.     Clinical stage      Cancer of upper-outer quadrant of female breast, right    Primary site: Breast (Right)    Clinical free text: ER-ve,IN-ve,Nek5xsp -ve    Clinical: Stage IIA (T1c, N1, cM0) - Signed by Laz Hernández MD on 7/29/2015    Summary: Stage IIA (T1c, N1, cM0)      History     Aida Alberts is a very pleasant 72 y.o. old female with a history of breast cancer diagnosed in 2003 located on the right side measuring 1.6 cm in size presenting as a mammogram otherwise completely asymptomatic for which she had lumpectomy and sentinel lymph node biopsy. [place] lymph node biopsy did show some positive  cells on immunohistochemistry in her lymph node. Subsequent to that she underwent adjuvant chemotherapy with a dose dense [place] and cyclophosphamide for 4 cycles. Then she went on to have radiation therapy. Then at [facility] they discovered that the lymph node might have been positive so she was given adjuvant Taxol therapy as well. This was triple negative type of breast cancer so no adjuvant hormonal therapy was given. She has since been followed between [facility] and her regular doctor. No evidence of any recurrence. In Summer of 2015 she was found to be positive for BRCA2 mutation. This came to light after her sister was diagnosed with ovarian cancer. She was tested and was found to be positive for BRCA2. Subsequent to that patient and her other siblings were also detected to be positive. Interestingly patient has 3 other unaffected siblings who are all positive as well.  Patient's father  of heart problems. One of his brother  of lung cancer. One of her nieces, sister's daughter was diagnosed with breast cancer at age 40. However she tested negative for BRCA2 mutation.    She was counseled to have her ovaries removed. In 2016 she underwent ADONAY with BSO. No malignancy was found. She recovering well from her surgery.     She had MRI of the breast done at [facility] in 2018.  That showed that behind the nipple on her left breast there were some shadows which was suspicious for malignancy.  She was recommended to have biopsy.  We discussed that.  She had a biopsy done last week.  Comes in today to discuss results of the same.  Overall feels well.    Past Medical History     Past Medical History:   Diagnosis Date     BRCA gene positive      Breast cancer      Colon polyp     patient reports about 6 adenomas     Fibrocystic breast      Hx antineoplastic chemotherapy      Hx of radiation therapy      Hyperlipidemia        Review of Systems   Constitutional  Constitutional (WDL):  Exceptions to WDL  Fatigue: Fatigue relieved by rest (works full-time)  Neurosensory  Neurosensory (WDL): Exceptions to WDL  Peripheral Sensory Neuropathy: Asymptomatic, loss of deep tendon reflexes or paresthesia (positional in hands)  Cardiovascular  Cardiovascular (WDL): Exceptions to WDL  Edema: Yes  Edema Limbs: 5 - 10% inter-limb discrepancy in volume or circumference at point of greatest visible difference, swelling or obscuration of anatomic architecture on close inspection (worse in the heat)  Pulmonary  Respiratory (WDL): Within Defined Limits  Gastrointestinal  Gastrointestinal (WDL): All gastrointestinal elements are within defined limits  Genitourinary  Genitourinary (WDL): All genitourinary elements are within defined limits  Integumentary  Integumentary (WDL): All integumentary elements are within defined limits  Patient Coping  Patient Coping: Accepting  Accompanied by  Accompanied by: Alone    ECOG performance status and Distress Assessment      ECOG Performance:    ECOG Performance Status: 0    Distress Assessment  Distress Assessment Score: No distress:     Pain Status  Currently in Pain: No/denies    Vital Signs     Vitals:    05/16/18 1517   BP: 134/66   Pulse: 83   Temp: 97.9  F (36.6  C)   SpO2: 95%       Physical Exam     GENERAL: no acute distress. Cooperative in conversation.   HEENT: pupils are equal, round and reactive. Oral mucosa is moist and intact.  RESP:Chest symmetric. Regular respiratory rate. No stridor.  ABD: Nondistended, soft.  EXTREMITIES: No lower extremity edema.   NEURO: non focal. Alert and oriented x3.   PSYCH: within normal limits. No depression or anxiety.  SKIN: warm dry intact     Lab Results     Results for orders placed or performed in visit on 03/01/18   Basic Metabolic Panel   Result Value Ref Range    Sodium 138 136 - 145 mmol/L    Potassium 5.0 3.5 - 5.0 mmol/L    Chloride 102 98 - 107 mmol/L    CO2 26 22 - 31 mmol/L    Anion Gap, Calculation 10 5 - 18 mmol/L     Glucose 90 70 - 125 mg/dL    Calcium 9.5 8.5 - 10.5 mg/dL    BUN 16 8 - 28 mg/dL    Creatinine 0.65 0.60 - 1.10 mg/dL    GFR MDRD Af Amer >60 >60 mL/min/1.73m2    GFR MDRD Non Af Amer >60 >60 mL/min/1.73m2   HM2(CBC w/o Differential)   Result Value Ref Range    WBC 5.9 4.0 - 11.0 thou/uL    RBC 5.08 3.80 - 5.40 mill/uL    Hemoglobin 15.5 12.0 - 16.0 g/dL    Hematocrit 45.9 35.0 - 47.0 %    MCV 90 80 - 100 fL    MCH 30.6 27.0 - 34.0 pg    MCHC 33.8 32.0 - 36.0 g/dL    RDW 11.3 11.0 - 14.5 %    Platelets 243 140 - 440 thou/uL    MPV 7.4 7.0 - 10.0 fL   Thyroid Stimulating Hormone (TSH)   Result Value Ref Range    TSH 4.13 0.30 - 5.00 uIU/mL   Hepatic Profile   Result Value Ref Range    Bilirubin, Total 0.4 0.0 - 1.0 mg/dL    Bilirubin, Direct 0.1 <=0.5 mg/dL    Protein, Total 7.3 6.0 - 8.0 g/dL    Albumin 3.9 3.5 - 5.0 g/dL    Alkaline Phosphatase 100 45 - 120 U/L    AST 28 0 - 40 U/L    ALT 43 0 - 45 U/L   Lipid Cascade   Result Value Ref Range    Cholesterol 250 (H) <=199 mg/dL    Triglycerides 197 (H) <=149 mg/dL    HDL Cholesterol 51 >=50 mg/dL    LDL Calculated 160 (H) <=129 mg/dL    Patient Fasting > 8hrs? Yes    Urinalysis-UC if Indicated   Result Value Ref Range    Color, UA Yellow Colorless, Yellow, Straw, Light Yellow    Clarity, UA Clear Clear    Glucose, UA Negative Negative    Bilirubin, UA Negative Negative    Ketones, UA Negative Negative    Specific Gravity, UA 1.025 1.005 - 1.030    Blood, UA Trace (!) Negative    pH, UA 5.5 5.0 - 8.0    Protein, UA Trace (!) Negative mg/dL    Urobilinogen, UA 0.2 E.U./dL 0.2 E.U./dL, 1.0 E.U./dL    Nitrite, UA Negative Negative    Leukocytes, UA Negative Negative   Vitamin D, Total (25-Hydroxy)   Result Value Ref Range    Vitamin D, Total (25-Hydroxy) 38.2 30.0 - 80.0 ng/mL     Histology (NOTE)   Surgical Final Report   Patient Name: KALYN WILLIAM   Accession #:[id number]   Taken: 5/11/2018   Received: 5/11/2018   Reported: 5/14/2018   Physician(s): ARSEN FELDMAN  "[email or web address]                      Final Pathologic Diagnosis   Breast, left 12 o'clock zone one, biopsy:            -Proliferative fibrocystic changes       -No evidence of atypia or malignancy   *Electronically Signed Out By Lois Siegel MD*   MD Lois Watson MD   Procedures/Addenda   Clinical History   Left breast 12:00 zone 1 enhancement seen on MRI   Gross Description   The specimen is received in formalin and labeled with the patient's   name and \"left breast 12 o'clock zone 1\".  The specimen consists of a   3.4 x 1.6 x 0.6 cm aggregate of multiple entangled and fragmented   tan-yellow cores of soft tissue.  35 percent of the specimen is   fibrous.  The specimen is inked red.  The specimen was collected and   placed in formalin at 0845 hours, May 11, 2018.  The specimen is   entirely submitted in three cassettes.  eo   eao/5/11/2018   Microscopic Description   Microscopic examination is performed.     mrs/5/14/2018   MD Lois Watson MD   [facility]   Department of Pathology   [address] [place] [address]          Imaging Results     No results found.        Laz Hernández MD     "

## 2021-06-18 NOTE — PROGRESS NOTES
Preoperative Exam    Scheduled Procedure: Cataract Surgery,   Surgery Date:  Left , Right ,   Surgery Location: [facility] Fax [phone]    Surgeon:  Dr. Latisha Mcgrath    Assessment/Plan:     1. Preop exam for internal medicine  She appears to medically stable for the proposed surgery.  As long as her cough continues to improve and does not worsen.  - Basic Metabolic Panel  - HM2(CBC w/o Differential)    2.  Sinusitis  She was given doxycycline recently, would prefer to add azithromycin for 7 days as she is not quite better.      Surgical Procedure Risk: Low (reported cardiac risk generally < 1%)  Have you had prior anesthesia?: Yes  Have you or any family members had a previous anesthesia reaction:  No  Do you or any family members have a history of a clotting or bleeding disorder?: No  Cardiac Risk Assessment: no increased risk for major cardiac complications    Patient approved for surgery with general or local anesthesia.        Functional Status: Independent  Patient plans to recover at home alone.     Subjective:      Aida Alberts is a 72 y.o. female who presents for a preoperative consultation.  She has been having decreased vision with halos and other abnormalities and now has mature cataracts and is planning on having the left one performed on Monday and the right one performed a couple of weeks thereafter.  She is feeling okay but is struggling with some grief reactions as her sister  and they had the  within the last week.    All other systems reviewed and are negative, other than those listed in the HPI.    Pertinent History  Do you have difficulty breathing or chest pain after walking up a flight of stairs: No  History of obstructive sleep apnea: No  Steroid use in the last 6 months: Yes: Oral  Frequent Aspirin/NSAID use: No  Prior Blood Transfusion: No  Prior Blood Transfusion Reaction: No  If for some reason prior to, during or after the  procedure, if it is medically indicated, would you be willing to have a blood transfusion?:  There is no transfusion refusal.    Current Outpatient Prescriptions   Medication Sig Dispense Refill     acetaminophen (TYLENOL) 325 MG tablet Take 325-650 mg by mouth every 4 (four) hours as needed.        cholecalciferol, vitamin D3, (VITAMIN D3) 2,000 unit capsule Take 2,000 Units by mouth daily.       diazePAM (VALIUM) 5 MG tablet Take 1-2 tablets (5-10 mg total) by mouth once as needed for anxiety or sleep (prior to MRI). 10 tablet 0     LORazepam (ATIVAN) 1 MG tablet Take 1 tablet (1 mg total) by mouth once as needed for anxiety (MRI). 2 tablet 0     MAGNESIUM ORAL Take 1 tablet by mouth every evening. OTC - unsure of strength       rosuvastatin (CRESTOR) 10 MG tablet Take 1 tablet (10 mg total) by mouth at bedtime. 90 tablet 11     zolpidem (AMBIEN) 5 MG tablet Take 1 tablet (5 mg total) by mouth at bedtime as needed for sleep. 30 tablet 0     azithromycin (ZITHROMAX) 500 MG tablet Take 1 tablet (500 mg total) by mouth daily for 7 days. 7 tablet 0     predniSONE (DELTASONE) 20 MG tablet TAKE 2 TABLETS BY MOUTH DAILY FOR 5 DAYS. TAKE WITH FOOD.  0     No current facility-administered medications for this visit.         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       Past Medical History:   Diagnosis Date     BRCA gene positive      Breast cancer 2002     Colon polyp     patient reports about 6 adenomas     Fibrocystic breast      Hx antineoplastic chemotherapy      Hx of radiation therapy      Hyperlipidemia        Past Surgical History:   Procedure Laterality Date     BREAST LUMPECTOMY       COLONOSCOPY   "     HYSTERECTOMY  02/23/2016     OOPHORECTOMY       MO EXCISE BREAST CYST      Description: Breast Surgery Lumpectomy;  Recorded: 01/07/2011;  Comments: Breast cancer, 2005       Social History     Social History     Marital status:      Spouse name: N/A     Number of children: N/A     Years of education: N/A     Occupational History     Not on file.     Social History Main Topics     Smoking status: Former Smoker     Quit date: 12/26/2008     Smokeless tobacco: Never Used      Comment: 1 pack a week until 2008     Alcohol use 4.2 oz/week     7 Glasses of wine per week     Drug use: No     Sexual activity: No     Other Topics Concern     Not on file     Social History Narrative    She owns a marketing company. She has 2 adopted children and 2 grandchildren and was  in 2014 after 44 years of marriage (he is bipolar).  She lives alone in an apartment.             Objective:     Vitals:    06/08/18 1305   BP: 106/52   Pulse: 78   Temp: 97.6  F (36.4  C)   TempSrc: Tympanic   SpO2: 97%   Weight: (!) 251 lb (113.9 kg)   Height: 5' 6\" (1.676 m)         Physical Exam:  EYES: Eyelids, conjunctiva, and sclera were normal. Pupils were normal.   HEAD, EARS, NOSE, MOUTH, AND THROAT: Head is atraumatic, ears and canals are normal with normal tympanic membranes.  Nose mouth and throat are without lesions.  NECK: Neck appearance was normal. There were no neck masses and the thyroid was not enlarged.  RESPIRATORY: Normal respirations.  Lung sounds were equal bilaterally.  CARDIOVASCULAR: Heart rate and rhythm were normal.  S1 and S2 were normal and there were no extra sounds or murmurs. There was no peripheral edema.  GASTROINTESTINAL: The abdomen was soft and nondistended.     MUSCULOSKELETAL: Skeletal configuration was normal and muscle mass was normal for age. Joint appearance was overall normal.  LYMPHATIC: There were no enlarged nodes palpable.  SKIN/HAIR/NAILS: Skin color was normal.  No rashes.  NEUROLOGIC: " The patient was alert and oriented.  Speech was normal.  There is no facial asymmetry.   PSYCHIATRIC:  Mood and affect were normal.         Patient Instructions     Follow your surgeon's direction on when to stop eating and drinking prior to surgery.          Labs:  Recent Results (from the past 24 hour(s))   HM2(CBC w/o Differential)    Collection Time: 06/08/18  1:26 PM   Result Value Ref Range    WBC 10.1 4.0 - 11.0 thou/uL    RBC 4.89 3.80 - 5.40 mill/uL    Hemoglobin 15.3 12.0 - 16.0 g/dL    Hematocrit 44.6 35.0 - 47.0 %    MCV 91 80 - 100 fL    MCH 31.2 27.0 - 34.0 pg    MCHC 34.2 32.0 - 36.0 g/dL    RDW 11.5 11.0 - 14.5 %    Platelets 261 140 - 440 thou/uL    MPV 7.2 7.0 - 10.0 fL       Immunization History   Administered Date(s) Administered     Influenza high dose, seasonal 11/09/2012, 01/03/2014, 11/24/2015, 10/06/2016, 11/22/2017     Influenza, inj, historic,unspecified 01/01/2009, 11/02/2011     Influenza, seasonal,quad inj 36+ mos 12/26/2014     Pneumo Conj 13-V (2010&after) 11/24/2015     Pneumo Polysac 23-V 02/15/2011, 12/26/2014     Tdap 11/09/2012           Electronically signed by Sharmin Mckinnon MD 06/08/18 1:02 PM

## 2021-06-18 NOTE — PROGRESS NOTES
Assessment:   The encounter diagnosis was Sinus complaint.     Plan:   No medications were ordered this encounter    Patient Instructions     Based on the information provided, I believe you need to be seen immediately.  Please go to urgent care as soon as possible.    You will not be charged for this eVisit.    No Follow-up on file.    Subjective:   Aida Alberts is a 72 y.o. female who submitted an eVisit request for evaluation of her Sinus Problem.  See the questionnaire and message section of encounter report for information related to history of present illness and review of systems.    The following portions of the patient's history were reviewed and updated as appropriate:  She  does not have any pertinent problems on file.  She is allergic to amoxicillin-pot clavulanate..     Objective:   No exam performed today, patient submitted as eVisit.

## 2021-06-30 NOTE — PROGRESS NOTES
"Progress Notes by Laz Hernández MD at 1/6/2021  2:45 PM     Author: Laz Hernández MD Service: -- Author Type: Physician    Filed: 1/11/2021 11:22 PM Encounter Date: 1/6/2021 Status: Signed    : Laz Hernández MD (Physician)       The patient has been notified of following:     \"This video visit will be conducted via a call between you and your physician/provider. We have found that certain health care needs can be provided without the need for an in-person physical exam.  This service lets us provide the care you need with a video conversation.  If a prescription is necessary we can send it directly to your pharmacy.  If lab work is needed we can place an order for that and you can then stop by our lab to have the test done at a later time.    Video visits are billed at different rates depending on your insurance coverage. Please reach out to your insurance provider with any questions.    If during the course of the call the physician/provider feels a video visit is not appropriate, you will not be charged for this service.\"    Patient has given verbal consent to a Video visit? Yes    Patient would like to receive their AVS by AVS Preference: [employer].      Will anyone else be joining your video visit? No          Video-Visit Details    Video Start Time:     Video End Time (time video stopped):     Total visit Time:     Originating Location (pt. Location): [facility] Progress Note    Patient: Aida Alberts  MRN: [id number]  Date of Service: 01/06/2021        Reason for visit      1. Malignant neoplasm of upper-outer quadrant of right breast in female, estrogen receptor negative (H)    2. BRCA2 positive    3. Genetic predisposition to cancer        Assessment     1. A very pleasant 75 y.o.  woman with history of breast cancer diagnosed in  2003 status post lumpectomy, adjuvant chemotherapy and radiation therapy. This was triple negative so no hormone therapy was needed.  " April 2018 MRI showed questionable shadow in the left breast.  Biopsy showed no evidence of any disease only some fibro-cystic changes and minimal proliferative changes.  Current mammogram is negative.  2. Found to be BRCA2 positive. Her one sister has been diagnosed with stage IV ovarian cancer. S/P b/l ADONAY and BSO.  3. History of smoking in the past.  4. Patient herself has no biological children.    Plan     1. Continue annual follow-ups.  She has been seen by the high-risk genetics clinic at the [facility].  2. Follow-up with me in a year.  She will get MRI of the breast to the high-risk clinic at [facility].  3. Follow-up with her primary care physician.  4. Good diet and exercise.  5. She also needs ophthalmologically screening since she is slightly high risk of ocular melanoma due to the BRCA mutation.    Clinical stage      Cancer of upper-outer quadrant of female breast, right    Primary site: Breast (Right)    Clinical free text: ER-ve,ND-ve,Rho4due -ve    Clinical: Stage IIA (T1c, N1, cM0) - Signed by Laz Hernández MD on 7/29/2015    Summary: Stage IIA (T1c, N1, cM0)      History     Aida Alberts is a very pleasant 75 y.o. old female with a history of breast cancer diagnosed in 2003 located on the right side measuring 1.6 cm in size presenting as a mammogram otherwise completely asymptomatic for which she had lumpectomy and sentinel lymph node biopsy. Calvin lymph node biopsy did show some positive cells on immunohistochemistry in her lymph node. Subsequent to that she underwent adjuvant chemotherapy with a dose dense [facility] and cyclophosphamide for 4 cycles. Then she went on to have radiation therapy. Then at [facility] they discovered that the lymph node might have been positive so she was given adjuvant Taxol therapy as well. This was triple negative type of breast cancer so no adjuvant hormonal therapy was given. She has since been followed between [facility] and her  regular doctor. No evidence of any recurrence. In Summer of 2015 she was found to be positive for BRCA2 mutation. This came to light after her sister was diagnosed with ovarian cancer. She was tested and was found to be positive for BRCA2. Subsequent to that patient and her other siblings were also detected to be positive. Interestingly patient has 3 other unaffected siblings who are all positive as well.  Patient's father  of heart problems. One of his brother  of lung cancer. One of her nieces, sister's daughter was diagnosed with breast cancer at age 40. However she tested negative for BRCA2 mutation.    She was counseled to have her ovaries removed. In 2016 she underwent ADONAY with BSO. No malignancy was found. She recovering well from her surgery.     She had MRI of the breast done at [facility] in 2018.  That showed that behind the nipple on her left breast there were some shadows which was suspicious for malignancy.  She was recommended to have biopsy.  That biopsy was negative as well.  She comes in today for scheduled follow-up.  She did have a mammogram done last month.  She was also referred to [facility] high-risk genetics clinic.  She was seen by Estrella Connelly here.  She is now getting regular screening and other advice from them.    Past Medical History     Past Medical History:   Diagnosis Date   ? BRCA gene positive    ? Breast cancer (H)    ? Colon polyp     patient reports about 6 adenomas   ? Fibrocystic breast    ? Hx antineoplastic chemotherapy    ? Hx of radiation therapy    ? Hyperlipidemia        Review of Systems   Constitutional  Constitutional (WDL): All constitutional elements are within defined limits  Neurosensory  Neurosensory (WDL): All neurosensory elements are within defined limits  Cardiovascular  Cardiovascular (WDL): All cardiovascular elements are within defined limits  Pulmonary  Respiratory (WDL): Within Defined  Limits  Gastrointestinal  Gastrointestinal (WDL): All gastrointestinal elements are within defined limits  Genitourinary  Genitourinary (WDL): All genitourinary elements are within defined limits  Integumentary  Integumentary (WDL): All integumentary elements are within defined limits  Patient Coping  Patient Coping: Accepting  Accompanied by       ECOG performance status and Distress Assessment      ECOG Performance:    ECOG Performance Status: 0    Distress Assessment  Distress Assessment Score: No distress:     Pain Status  Currently in Pain: No/denies    Vital Signs     There were no vitals filed for this visit.    Physical Exam     GENERAL: no acute distress. Cooperative in conversation.   HEENT: Facial symmetry preserved.  Oral mucosa is moist and intact.  NECK: No visible thyromegaly.  No deformity.  RESP: Regular respiratory rate. No stridor.  No coughing.  EXTREMITIES: No visible upper extremity edema.   NEURO: non focal. Alert and oriented x3.  Cranial nerves II through XI appear intact clinically.  PSYCH: within normal limits.   SKIN: Facial skin appears warm dry intact       Lab Results     Results for orders placed or performed in visit on 06/08/18   Basic Metabolic Panel   Result Value Ref Range    Sodium 140 136 - 145 mmol/L    Potassium 4.9 3.5 - 5.0 mmol/L    Chloride 101 98 - 107 mmol/L    CO2 27 22 - 31 mmol/L    Anion Gap, Calculation 12 5 - 18 mmol/L    Glucose 74 70 - 125 mg/dL    Calcium 9.7 8.5 - 10.5 mg/dL    BUN 16 8 - 28 mg/dL    Creatinine 0.64 0.60 - 1.10 mg/dL    GFR MDRD Af Amer >60 >60 mL/min/1.73m2    GFR MDRD Non Af Amer >60 >60 mL/min/1.73m2   HM2(CBC w/o Differential)   Result Value Ref Range    WBC 10.1 4.0 - 11.0 thou/uL    RBC 4.89 3.80 - 5.40 mill/uL    Hemoglobin 15.3 12.0 - 16.0 g/dL    Hematocrit 44.6 35.0 - 47.0 %    MCV 91 80 - 100 fL    MCH 31.2 27.0 - 34.0 pg    MCHC 34.2 32.0 - 36.0 g/dL    RDW 11.5 11.0 - 14.5 %    Platelets 261 140 - 440 thou/uL    MPV 7.2 7.0 - 10.0  fL           Imaging Results     MA Screen Bilateral w/Tomo12/22/2020  [place]  Result Impression   IMPRESSION: BI-RADS CATEGORY: 2 - Benign.    RECOMMENDED FOLLOW-UP: Annual Mammography. Ongoing risk-based breast  cancer screening.    Results to be sent to the patient.    ISAÍAS ROSALES MD   Result Narrative   Examination: Bilateral digital screening mammography and bilateral  digital tomosynthesis with computer aided detection.    Comparison: 10/7/2019, 5/11/2018, 9/8/2017, 6/18/2020    History/family history: No symptoms, routine screening. Right breast  cancer status post breast conservation therapy. BRCA2 gene mutation  positive.  Relatives with breast and ovarian cancer.    TECHNIQUE:  Tomosynthesis    BREAST DENSITY: Heterogeneously dense.    COMMENTS: Right breast conservation therapy changes.  No significant  change in either breast.         Laz Hernández MD

## 2021-07-01 ENCOUNTER — MYC MEDICAL ADVICE (OUTPATIENT)
Dept: ONCOLOGY | Facility: CLINIC | Age: 76
End: 2021-07-01

## 2021-07-07 ENCOUNTER — AMBULATORY - HEALTHEAST (OUTPATIENT)
Dept: ONCOLOGY | Facility: HOSPITAL | Age: 76
End: 2021-07-07

## 2021-07-07 DIAGNOSIS — Z15.09 BRCA2 POSITIVE: ICD-10-CM

## 2021-07-07 DIAGNOSIS — Z15.01 BRCA2 POSITIVE: ICD-10-CM

## 2021-07-22 ENCOUNTER — TELEPHONE (OUTPATIENT)
Dept: ONCOLOGY | Facility: CLINIC | Age: 76
End: 2021-07-22

## 2021-07-22 DIAGNOSIS — Z15.09 BRCA2 POSITIVE: Primary | ICD-10-CM

## 2021-07-22 DIAGNOSIS — Z15.01 BRCA2 POSITIVE: Primary | ICD-10-CM

## 2021-07-22 DIAGNOSIS — N64.89 OTHER SPECIFIED DISORDERS OF BREAST: ICD-10-CM

## 2021-07-22 DIAGNOSIS — F41.9 ANXIETY: ICD-10-CM

## 2021-07-22 RX ORDER — LORAZEPAM 2 MG/1
TABLET ORAL
Qty: 1 TABLET | Refills: 0 | Status: SHIPPED | OUTPATIENT
Start: 2021-07-22 | End: 2024-04-08

## 2021-07-22 NOTE — TELEPHONE ENCOUNTER
----- Message from JACKSON Ward sent at 7/22/2021  7:44 AM CDT -----  Regarding: Please fax the breast MRI order to [facility]  I included the phone and fax numbers in the comment box on the order I wrote today for her.  I tried this 2 weeks ago on the [facility] side- I think it got lost in the Epic upgrade.  Please let me know when the fax is successful.    Thanks.  JACKSON Ward-CNS, OCN, AGN-BC  Clinical Nurse Specialist  Cancer Risk Management Program  [facility] [address]  [place] [address]    phone:  [phone]  Pager: [phone]  fax: [phone]

## 2021-08-11 ENCOUNTER — DOCUMENTATION ONLY (OUTPATIENT)
Dept: ORTHOPEDICS | Facility: CLINIC | Age: 76
End: 2021-08-11

## 2021-08-11 NOTE — PROGRESS NOTES
S: Aida would like to reorder three post-mastectomy bras: Magali Contour #103 in black and beige, as well as the [employer] Leisure #110 bra in black just like she received last time in December 2020. RX on-file is current from Dr. Zapata.    A: Order was submitted for fabrication to [employer] for the three above-mentioned bras in size 46B and XL, C/D.    P: Upon receiving her garments, they will be shipped to her home address as per usual.

## 2021-08-19 ENCOUNTER — DOCUMENTATION ONLY (OUTPATIENT)
Dept: ORTHOPEDICS | Facility: CLINIC | Age: 76
End: 2021-08-19

## 2021-08-19 NOTE — PROGRESS NOTES
S: Aida's ABC post-mastectomy bras (two 's and 1 110) arrived to . RX on-file is current from Kathya Zapata CNP.    A: No assessment was made. Items will be shipped to Aida's home address, along with proper compliance paperwork to be signed, dated, and returned.    P: Aida is to call with any further needs.  Goal is to maintain a home program

## 2021-09-04 ENCOUNTER — HEALTH MAINTENANCE LETTER (OUTPATIENT)
Age: 76
End: 2021-09-04

## 2021-12-29 ENCOUNTER — ONCOLOGY VISIT (OUTPATIENT)
Dept: ONCOLOGY | Facility: CLINIC | Age: 76
End: 2021-12-29
Attending: CLINICAL NURSE SPECIALIST
Payer: COMMERCIAL

## 2021-12-29 ENCOUNTER — ANCILLARY PROCEDURE (OUTPATIENT)
Dept: MAMMOGRAPHY | Facility: CLINIC | Age: 76
End: 2021-12-29
Attending: CLINICAL NURSE SPECIALIST
Payer: COMMERCIAL

## 2021-12-29 VITALS
BODY MASS INDEX: 40.74 KG/M2 | HEART RATE: 80 BPM | SYSTOLIC BLOOD PRESSURE: 141 MMHG | WEIGHT: 246 LBS | DIASTOLIC BLOOD PRESSURE: 78 MMHG | OXYGEN SATURATION: 96 %

## 2021-12-29 DIAGNOSIS — Z15.01 BRCA2 POSITIVE: ICD-10-CM

## 2021-12-29 DIAGNOSIS — Z12.31 ENCOUNTER FOR SCREENING MAMMOGRAM FOR MALIGNANT NEOPLASM OF BREAST: ICD-10-CM

## 2021-12-29 DIAGNOSIS — Z17.1 MALIGNANT NEOPLASM OF UPPER-OUTER QUADRANT OF RIGHT BREAST IN FEMALE, ESTROGEN RECEPTOR NEGATIVE (H): ICD-10-CM

## 2021-12-29 DIAGNOSIS — Z15.09 BRCA2 POSITIVE: ICD-10-CM

## 2021-12-29 DIAGNOSIS — F41.9 ANXIETY: ICD-10-CM

## 2021-12-29 DIAGNOSIS — R92.30 DENSE BREAST: ICD-10-CM

## 2021-12-29 DIAGNOSIS — C50.411 MALIGNANT NEOPLASM OF UPPER-OUTER QUADRANT OF RIGHT BREAST IN FEMALE, ESTROGEN RECEPTOR NEGATIVE (H): ICD-10-CM

## 2021-12-29 DIAGNOSIS — Z86.0101 HISTORY OF ADENOMATOUS POLYP OF COLON: Primary | ICD-10-CM

## 2021-12-29 PROCEDURE — G0463 HOSPITAL OUTPT CLINIC VISIT: HCPCS

## 2021-12-29 PROCEDURE — 99215 OFFICE O/P EST HI 40 MIN: CPT | Performed by: CLINICAL NURSE SPECIALIST

## 2021-12-29 PROCEDURE — 77063 BREAST TOMOSYNTHESIS BI: CPT | Mod: GC | Performed by: STUDENT IN AN ORGANIZED HEALTH CARE EDUCATION/TRAINING PROGRAM

## 2021-12-29 PROCEDURE — 77067 SCR MAMMO BI INCL CAD: CPT | Mod: GC | Performed by: STUDENT IN AN ORGANIZED HEALTH CARE EDUCATION/TRAINING PROGRAM

## 2021-12-29 RX ORDER — LORAZEPAM 1 MG/1
2 TABLET ORAL
Qty: 2 TABLET | Refills: 0 | Status: SHIPPED | OUTPATIENT
Start: 2021-12-29 | End: 2023-02-08

## 2021-12-29 ASSESSMENT — PAIN SCALES - GENERAL: PAINLEVEL: NO PAIN (0)

## 2021-12-29 NOTE — NURSING NOTE
"Oncology Rooming Note    December 29, 2021 11:03 AM   Aida Alberts is a 76 year old female who presents for:    Chief Complaint   Patient presents with     Oncology Clinic Visit     KEB; BRCA2+; history of breast cancer     Initial Vitals: BP (!) 141/78   Pulse 80   Wt 111.6 kg (246 lb)   LMP  (LMP Unknown)   SpO2 96%   BMI 40.74 kg/m   Estimated body mass index is 40.74 kg/m  as calculated from the following:    Height as of 1/3/20: 1.655 m (5' 5.16\").    Weight as of this encounter: 111.6 kg (246 lb). Body surface area is 2.27 meters squared.  No Pain (0) Comment: Data Unavailable   No LMP recorded (lmp unknown). Patient is postmenopausal.  Allergies reviewed: Yes  Medications reviewed: Yes    Medications: Medication refills not needed today.  Pharmacy name entered into Ev[employer]:    [id number] - [place], [place] - [address] [id number] - [place], [place] - [address] AT [id number], [place] - [address] [address]    Clinical concerns: none       Lorena Rodriguez CMA            "

## 2021-12-29 NOTE — PROGRESS NOTES
Oncology Risk Management Consultation:  Date on this visit: 2021    Aida Alberts  returns to the Cancer Risk Management Program today at the [facility] for follow up.  She requires screening and surveillance to minimize her risk of cancer secondary to a deleterious BRCA2 mutation.  She is considered to be at high risk for hereditary breast and ovarian cancer. She has a history of triple negative breast cancer diagnosed in  status post lumpectomy, adjuvant chemotherapy and radiation therapy. She is followed by Dr. Hernández for this.     Primary Physician: none    History Of Present Illness:  Ms. 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      Genetic Testin2015 - Positive for a deleterious BRCA2+, deletions of exons 5-7, which is the same mutation identified in her sister who was diagnosed with stage IV ovarian cancer. The testing was done using a single site genetic test from [employer].     Pertinent history:  Menarche (age unknown)  Nulliparous.  Menopause in 40s.  Breast density: Heterogeneously dense.  History of triple negative breast cancer diagnosed in  located on the right side measuring 1.6 cm in size presenting as a mammogram otherwise completely asymptomatic for which she had lumpectomy and sentinel lymph node biopsy. [place] lymph node biopsy did show some positive cells on immunohistochemistry in her lymph node. Subsequent to that she underwent adjuvant chemotherapy with a dose dense Adriamycin and cyclophosphamide for 4 cycles. Then she went on to have radiation therapy. Subsequently,  at [facility] they discovered that the lymph node might have been positive so she was given adjuvant Taxol therapy as well.     2016 [facility] pathology showed benign endometrial polyp (2.7 cm), benign submucosal, intramural and subserosal leiomyomata (6; up to 0.9 cm), deep  adenomyosis, endometriosis, uterine serosa, bilateral ovaries and fallopian tubes with no evidence of dysplasia or malignancy.  Mild chronic cervicitis.  No evidence of cervical dysplasia.     History of OCPs: No  History of HRT: No     Pertinent screening history:  5/15/2000- Mammogram  5/18/2000 - bilateral breast ultrasound  6/5/2002- Mammogram -  6/20/2003- Mammogram  1/31/2013 - Breast imaging ([facility]?)  1/2/2015 - Screening mammogram, BiRads0 for 1.2 cm partially circumscribed mass in upper outer left breast at 2:00.   1/9/2015 - Left breast US, BiRads3, benign cyst and probable cluster of benign microcysts at 2:00 within zone 2-3  5/28/2015 - Left breast US, BiRads3, Cluster of probable benign microcysts 2:00 zone 3, located approximately 7 cm from the nipple, have decreased slightly in size.  6/25/2015 - Mammogram ([facility])  7/24/2015 - Breast MRI, BiRads2.  2/19/2016 - Mammogram ([facility])  2/19/2016 - Left breast US and Diagnostic tomosynthesis mammogram both, BiRads 0 for 2:00 position zone 2 demonstrates a hypoechoic mass 1.5 x 0.7 cm. This most likely represents a cyst. internal echoes within the mass.   2/22/2016 - Breast MRI, BiRads2, likely benign cyst in left breast.  8/22/2016 Screening mammogram, BiRads2  3/6/2017 -Breast MRI, BiRads2.  9/8/2017 - Screening mammogram, BiRads2  4/20/2018 - Breast MRI, BiRads4,  LEFT BREAST: Regional area of clumped retroareolar plane enhancement 4.2 x 1.2 cm for which MR-guided biopsy is recommended as this is indeterminate.  5/11/2018 - Left breast biopsy, Pathology reveals fibrocystic change.  10/7/2019 - Screening mammogram, BiRads2.  6/18/2020- Breast MRI, BiRads2  12/18/2020- Screening tomosynthesis mammogram, BiRads2  6/21/2021- Breast MRI, BiRads2     3/26/2012- Colonoscopy, 5 mm tubular adenoma in cecum, 16-20mm tubulovillous adenoma in ascending colon, 5-6 mm tubular adenoma in transverse colon  5/8/2013-Colonoscopy, 6 mm sessile serrated adenoma in cecum  10/17/2016 -  Colonoscopy, Three 4-10 mm tubular adenomas in the ascending colon, three 3-5 mm tubular adenomas in descending colon, 4-5 mm tubular adenoma   11/17/2017 - Colonoscopy, One 3 mm tubular adenoma in cecum  12/21/2020- Colonoscopy (Dr. Kwong, [facility], [facility]), advanced tubular adenoma (11-15 mm) in descending colon; negative for atypia and malignancy     At this visit, she denies new fatigue, breast pain, asymmetry, lumps, masses, thickening, nipple discharge and skin changes in her breasts. She denies any urinary urgency, frequency, abdominal pain, bloating and early satiety.       Past Medical/Surgical History:  Past Medical History:   Diagnosis Date     BRCA gene positive      BRCA2 positive 01/22/2015    deletion of exons 5-7, identified using single site testing through [facility]     Breast cancer (H) 2002     Colon polyp     patient reports about 6 adenomas     Colonic adenoma     5-6 advanced adenomas in 2013 colonoscopy     Fibrocystic breast      Hx antineoplastic chemotherapy      Hx of radiation therapy      Hyperlipidemia      Malignant neoplasm of upper-outer quadrant of right breast in female, estrogen receptor negative (H) 2002    infiltrating ductal carcinoma w/DCIS, Stage IIA (T1c, N1, cM0, Free text: ER-ve,UT-ve,Lsy9jes -ve)     Past Surgical History:   Procedure Laterality Date     BIOPSY BREAST       BIOPSY BREAST  2018    at [facility]     COLONOSCOPY       EXCISE BREAST CYST/FIBROADENOMA/TUMOR/DUCT LESION/NIPPLE LESION/AREOLAR LESION      Description: Breast Surgery Lumpectomy;  Recorded: 01/07/2011;  Comments: Breast cancer, 2005     HYSTERECTOMY  02/23/2016     LUMPECTOMY BREAST       OOPHORECTOMY         Allergies:  Allergies as of 12/29/2021 - Reviewed 06/21/2021   Allergen Reaction Noted     Cefuroxime Hives 06/02/2018     Amoxicillin-pot clavulanate Nausea and Vomiting 02/15/2016       Current Medications:  Current Outpatient Medications   Medication Sig Dispense  Refill     acetaminophen (TYLENOL) 325 MG tablet Take 325-650 mg by mouth       Cholecalciferol (VITAMIN D3) 2000 UNITS CAPS Take 2,000 Units by mouth       diazepam (VALIUM) 5 MG tablet Take 5-10 mg by mouth       fluticasone (FLONASE) 50 MCG/ACT spray Spray 2 sprays in nostril       LORazepam (ATIVAN) 2 MG tablet Take 30 minutes prior to departure for breast MRI.  Do not operate a vehicle after taking this medication 1 tablet 0     magnesium 250 MG tablet Take 1 tablet by mouth       rosuvastatin (CRESTOR) 10 MG tablet Take 20 mg by mouth           Family History:  Family History   Problem Relation Age of Onset     Hereditary Breast and Ovarian Cancer Syndrome Sister         BRCA2+, same mutation     Ovarian Cancer Sister 77        endometriod adenocarcinoma, grade2     Breast Cancer Niece 40     Melanoma Niece 29     Cancer Paternal Uncle         intestinal or renal cell      Ovarian Cancer Sister 77.00        endometrioid, grade 2     Hereditary Breast and Ovarian Cancer Syndrome Sister         BRCA2 positive, del exons 5-7, [facility]     Heart Failure Mother          age 96     Heart Disease Father          in his 60s     Hereditary Breast and Ovarian Cancer Syndrome Brother      Hereditary Breast and Ovarian Cancer Syndrome Sister      Hereditary Breast and Ovarian Cancer Syndrome Sister      Heart Disease Sister         HOCM     Uterine Cancer Sister 76.00         age 76       Social History:  Social History     Socioeconomic History     Marital status:      Spouse name: Not on file     Number of children: 2     Years of education: Not on file     Highest education level: Not on file   Occupational History     Occupation: Owner     Employer: [employer]   Tobacco Use     Smoking status: Former Smoker     Packs/day: 0.50     Years: 17.00     Pack years: 8.50     Types: Cigarettes     Start date: 1/3/1965     Quit date: 2008     Years since quittin.0      Smokeless tobacco: Never Used     Tobacco comment: 1 pack a week until 2008   Substance and Sexual Activity     Alcohol use: Yes     Alcohol/week: 7.0 standard drinks     Drug use: No     Sexual activity: Never   Other Topics Concern     Not on file   Social History Narrative    She owns a marketing company. She has 2 adopted children and 2 grandchildren and was  in 2014 after 44 years of marriage (he is bipolar).  She lives alone in an apartment.     Social Determinants of Health     Financial Resource Strain: Not on file   Food Insecurity: Not on file   Transportation Needs: Not on file   Physical Activity: Not on file   Stress: Not on file   Social Connections: Not on file   Intimate Partner Violence: Not on file   Housing Stability: Not on file       Physical Exam:  LMP  (LMP Unknown)     GENERAL APPEARANCE: healthy, alert and no distress     NECK: no adenopathy, no asymmetry or masses     LYMPHATICS: Two enlarged cervical lymph nodes, bilaterally.  No supraclavicular or axillary lymphadenopathy     RESP: lungs clear to auscultation - no rales, rhonchi or wheezes     CARDIOVASCULAR: regular rate and rhythm, normal S1 S2, no S3 or S4 and no murmur.   BREASTS: Examined in a sitting and lying position. R about 20% larger than left.  No visible distortion, swelling or rashes. No nipple inversion, nipple discharge, breast dimpling or puckering. Breast tissue is heterogenous dense to palpation with minor fibrocystic changes, particularly in the right breast in the medal two quadrants. Left axilla, subcutaneous round nodule, likely a cyst arising next to scar tissue.  Right axilla, no findings.  SKIN: Multiple hyperpigmented dark tan moles scattered throughout torso.  No suspicious lesions or rashes on anterior torso, upper extremities or face.    Laboratory/Imaging Studies  No results found for any visits on 12/29/21.    ASSESSMENT  We discussed her remaining risk for breast cancer (about 25% through age 81),  ovarian (2.5% through age 81) and pancreatic cancer (3.71% through age 81) according to literature used in the All Syndromes Known To Man Calculator.  For this reason, I would encourage her to continue with both breast MRI and mammogram if she can. She will have her breast MRI in July at [facility] again and I will have the order faxed over for her.  She will have 2 mg lorazepam for this; I have sent the prescription to her pharmacy.      We discussed that because she had an advanced adenoma on her last colonoscopy in December 2020, I would recommend another colonoscopy soon; I have put an order in for this.  She does also have a history of stomach cancer in her father, which she thinks was in his 30s-40s. At this point, there is no strong recommendation for upper GI screening, but she can also discuss any symptoms with her gastroenterologist.      We reviewed her health promotion habits and her interval history.  She has been losing weight using [employer]om, and has intentionally lost 11 pounds since June 2020.  She walks 4/x week with friends and is achieving 8000 steps per day, with her goal being 10,000.  She will be taking pickle ball lessons starting in January and we discussed weightlifting and flexibility using the [email or web address] with Bhavani reny or the Silver Sneakers program.  She had her eyes checked October 5 with Dr. Faith guaman at [facility] and will be having a skin exam with Dr. Billingsley at [facility] this year.  She states that her intention is that she do everything she can to focus on: 1. Wellness  2. Travel and 3. Relationships and she sees her weight loss and health promotion as fitting well within these goals.    She also is asking about a prosthesis replacement for her post mastectomy bras;  I am consulting with the [facility] RNs to see which vendor would be recommended for her.    Individualized Surveillance Plan for women  Hereditary Breast and/or Ovarian Cancer Syndrome   Per NCCN Guidelines  Version 1.2022   Recommended screening Test or procedure Last done Next Scheduled    Breast self awareness starting at age 18.    Breast cancer risk >60% Women should be familiar with their breasts and promptly report changes to their care provider. Periodic, consistent self exam may facilitate breast self awareness. Premenopausal women may find self exams to be most informative when performed at the end of menses.   12/29/2021 December 2022   Breast screening, starting at age 25 Clinical breast exams every 6 -12 months 12/29/2021 December 2022   Breast screening   Age 25-29 Annual breast MRI screening with contrast (or mammogram if MRI is unavailable) or individualized based on family history if a breast cancer diagnosis before age 30 is present.       Breast MRI is performed preferably on day 7-15 of menstrual cycle for premenopausal women.   See below   See below   Breast screening   Age >30-75 years     Annual mammogram (consider tomosynthesis mammogram) and annual screening MRI.     Breast MRI is performed preferably on day 7-15 of menstrual cycle for premenopausal women.    Age>75 years, management should be considered on an individual basis.   Discuss screening Mammogram after visit today    Breast MRI in June at [facility]    Next exam:  December followed by mammogram   Ovarian cancer screening, starting at age 30-35    Absolute risk for epithelial ovarian cancer -39-58% for BRCA1+ carriers and 13-29% for BRCA2+ carriers   Consider transvaginal ultrasound and  tests.   NA-THBSO   NA   Pancreatic Cancer Screening beginning at age 50 or 10 years prior to the earliest pancreatic cancer on the BRCA-side of the family  In families with exocrine pancreatic cancer in a first or second degree relative    Risk is <5% for BRCA1+ carriers    5-10% for BRCA2+ carriers     Consider Annual MRI/MRCP and/or Endoscopic Ultrasound   No family history of pancreatic cancer   Review at next visit   Recommendation:  risk-reducing salpingo-oophorectomy(RRSO), typically between 35 and 40 years old and  upon completion of child bearing.     Because ovarian cancer onset in patients with BRCA2 mutations is on average of 8-10 years later than in patients with BRCA1 mutations, it is reasonable to delay RRSO until 40-45 years in patients with BRCA2 mutations  unless age at diagnosis in the family warrants earlier age for consideration for prophylactic surgery.    Limited data suggest that there may be a slightly increased risk of serous uterine cancer among women with BRCA1+ pathogenic variants. The provider and the patient should discuss the risks and benefits of a concurrent hysterectomy at the time of RRSO for women with a BRCA1+ pathogenic variant /like pathogenic variant prior to surgery.     Salpingectomy alone is not the standard of care for risk reduction although clinical trials are ongoing.     Discuss option of risk-reducing mastectomy.    Consider risks and benefits of risk reducing agents, such as tamoxifen and raloxifene for breast and ovarian cancer.    Consider a full body skin and eye exam for melanoma screening for both BRCA1+ and BRCA2+.     NOTE: Women with BRCA mutation who are treated for breast cancer should have screening of the remaining breast tissue with annual mammography and breast MRI.       I spent a total of 56 minutes on the day of the visit. Please see the note for further information on patient assessment and treatment.    Estrella Her, JACKSON-CNS, OCN, AGN-BC  Clinical Nurse Specialist  Cancer Risk Management Program  [facility]  phone:  [phone]  fax: [phone]    CC: No primary care provider on file.

## 2021-12-29 NOTE — LETTER
Date:December 30, 2021      Patient was self referred, no letter generated. Do not send.        [facility] Health Information

## 2021-12-29 NOTE — LETTER
2021         RE: Aida Alberts  [address]  [facility] [address]        Dear Colleague,    Thank you for referring your patient, Aida Alberts, to the [facility]. Please see a copy of my visit note below.    Oncology Risk Management Consultation:  Date on this visit: 2021    Aida Alberts  returns to the Cancer Risk Management Program today at the [facility] for follow up.  She requires screening and surveillance to minimize her risk of cancer secondary to a deleterious BRCA2 mutation.  She is considered to be at high risk for hereditary breast and ovarian cancer. She has a history of triple negative breast cancer diagnosed in  status post lumpectomy, adjuvant chemotherapy and radiation therapy. She is followed by Dr. Hernández for this.     Primary Physician: none    History Of Present Illness:  Ms. 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      Genetic Testin2015 - Positive for a deleterious BRCA2+, deletions of exons 5-7, which is the same mutation identified in her sister who was diagnosed with stage IV ovarian cancer. The testing was done using a single site genetic test from [employer].     Pertinent history:  Menarche (age unknown)  Nulliparous.  Menopause in 40s.  Breast density: Heterogeneously dense.  History of triple negative breast cancer diagnosed in  located on the right side measuring 1.6 cm in size presenting as a mammogram otherwise completely asymptomatic for which she had lumpectomy and sentinel lymph node biopsy. [place] lymph node biopsy did show some positive cells on immunohistochemistry in her lymph node. Subsequent to that she underwent adjuvant chemotherapy with a dose dense Adriamycin and cyclophosphamide for 4 cycles. Then she went on to have radiation therapy. Subsequently,  at [facility] they discovered that the lymph node might have been positive so she was given adjuvant Taxol therapy as well.     2/23/2016 [facility] pathology showed benign endometrial polyp (2.7 cm), benign submucosal, intramural and subserosal leiomyomata (6; up to 0.9 cm), deep adenomyosis, endometriosis, uterine serosa, bilateral ovaries and fallopian tubes with no evidence of dysplasia or malignancy.  Mild chronic cervicitis.  No evidence of cervical dysplasia.     History of OCPs: No  History of HRT: No     Pertinent screening history:  5/15/2000- Mammogram  5/18/2000 - bilateral breast ultrasound  6/5/2002- Mammogram -  6/20/2003- Mammogram  1/31/2013 - Breast imaging ([facility]?)  1/2/2015 - Screening mammogram, BiRads0 for 1.2 cm partially circumscribed mass in upper outer left breast at 2:00.   1/9/2015 - Left breast US, BiRads3, benign cyst and probable cluster of benign microcysts at 2:00 within zone 2-3  5/28/2015 - Left breast US, BiRads3, Cluster of probable benign microcysts 2:00 zone 3, located approximately 7 cm from the nipple, have decreased slightly in size.  6/25/2015 - Mammogram ([facility])  7/24/2015 - Breast MRI, BiRads2.  2/19/2016 - Mammogram ([facility])  2/19/2016 - Left breast US and Diagnostic tomosynthesis mammogram both, BiRads 0 for 2:00 position zone 2 demonstrates a hypoechoic mass 1.5 x 0.7 cm. This most likely represents a cyst. internal echoes within the mass.   2/22/2016 - Breast MRI, BiRads2, likely benign cyst in left breast.  8/22/2016 Screening mammogram, BiRads2  3/6/2017 -Breast MRI, BiRads2.  9/8/2017 - Screening mammogram, BiRads2  4/20/2018 - Breast MRI, BiRads4,  LEFT BREAST: Regional area of clumped retroareolar plane enhancement 4.2 x 1.2 cm for which MR-guided biopsy is recommended as this is indeterminate.  5/11/2018 - Left breast biopsy, Pathology reveals fibrocystic change.  10/7/2019 - Screening mammogram, BiRads2.  6/18/2020- Breast MRI, BiRads2  12/18/2020- Screening tomosynthesis  mammogram, BiRads2  6/21/2021- Breast MRI, BiRads2     3/26/2012- Colonoscopy, 5 mm tubular adenoma in cecum, 16-20mm tubulovillous adenoma in ascending colon, 5-6 mm tubular adenoma in transverse colon  5/8/2013-Colonoscopy, 6 mm sessile serrated adenoma in cecum  10/17/2016 - Colonoscopy, Three 4-10 mm tubular adenomas in the ascending colon, three 3-5 mm tubular adenomas in descending colon, 4-5 mm tubular adenoma   11/17/2017 - Colonoscopy, One 3 mm tubular adenoma in cecum  12/21/2020- Colonoscopy (Dr. Kwong, [facility], [facility]), advanced tubular adenoma (11-15 mm) in descending colon; negative for atypia and malignancy     At this visit, she denies new fatigue, breast pain, asymmetry, lumps, masses, thickening, nipple discharge and skin changes in her breasts. She denies any urinary urgency, frequency, abdominal pain, bloating and early satiety.       Past Medical/Surgical History:  Past Medical History:   Diagnosis Date     BRCA gene positive      BRCA2 positive 01/22/2015    deletion of exons 5-7, identified using single site testing through [employer]     Breast cancer (H) 2002     Colon polyp     patient reports about 6 adenomas     Colonic adenoma     5-6 advanced adenomas in 2013 colonoscopy     Fibrocystic breast      Hx antineoplastic chemotherapy      Hx of radiation therapy      Hyperlipidemia      Malignant neoplasm of upper-outer quadrant of right breast in female, estrogen receptor negative (H) 2002    infiltrating ductal carcinoma w/DCIS, Stage IIA (T1c, N1, cM0, Free text: ER-ve,IA-ve,Pox4qsb -ve)     Past Surgical History:   Procedure Laterality Date     BIOPSY BREAST       BIOPSY BREAST  2018    at [facility] MRI     COLONOSCOPY       EXCISE BREAST CYST/FIBROADENOMA/TUMOR/DUCT LESION/NIPPLE LESION/AREOLAR LESION      Description: Breast Surgery Lumpectomy;  Recorded: 01/07/2011;  Comments: Breast cancer, 2005     HYSTERECTOMY  02/23/2016     LUMPECTOMY BREAST        OOPHORECTOMY         Allergies:  Allergies as of 2021 - Reviewed 2021   Allergen Reaction Noted     Cefuroxime Hives 2018     Amoxicillin-pot clavulanate Nausea and Vomiting 02/15/2016       Current Medications:  Current Outpatient Medications   Medication Sig Dispense Refill     acetaminophen (TYLENOL) 325 MG tablet Take 325-650 mg by mouth       Cholecalciferol (VITAMIN D3) 2000 UNITS CAPS Take 2,000 Units by mouth       diazepam (VALIUM) 5 MG tablet Take 5-10 mg by mouth       fluticasone (FLONASE) 50 MCG/ACT spray Spray 2 sprays in nostril       LORazepam (ATIVAN) 2 MG tablet Take 30 minutes prior to departure for breast MRI.  Do not operate a vehicle after taking this medication 1 tablet 0     magnesium 250 MG tablet Take 1 tablet by mouth       rosuvastatin (CRESTOR) 10 MG tablet Take 20 mg by mouth           Family History:  Family History   Problem Relation Age of Onset     Hereditary Breast and Ovarian Cancer Syndrome Sister         BRCA2+, same mutation     Ovarian Cancer Sister 77        endometriod adenocarcinoma, grade2     Breast Cancer Niece 40     Melanoma Niece 29     Cancer Paternal Uncle         intestinal or renal cell      Ovarian Cancer Sister 77.00        endometrioid, grade 2     Hereditary Breast and Ovarian Cancer Syndrome Sister         BRCA2 positive, del exons 5-7, [employer]     Heart Failure Mother          age 96     Heart Disease Father          in his 60s     Hereditary Breast and Ovarian Cancer Syndrome Brother      Hereditary Breast and Ovarian Cancer Syndrome Sister      Hereditary Breast and Ovarian Cancer Syndrome Sister      Heart Disease Sister         HOCM     Uterine Cancer Sister 76.00         age 76       Social History:  Social History     Socioeconomic History     Marital status:      Spouse name: Not on file     Number of children: 2     Years of education: Not on file     Highest education level: Not on file    Occupational History     Occupation: Owner     Employer: [employer]   Tobacco Use     Smoking status: Former Smoker     Packs/day: 0.50     Years: 17.00     Pack years: 8.50     Types: Cigarettes     Start date: 1/3/1965     Quit date: 2008     Years since quittin.0     Smokeless tobacco: Never Used     Tobacco comment: 1 pack a week until    Substance and Sexual Activity     Alcohol use: Yes     Alcohol/week: 7.0 standard drinks     Drug use: No     Sexual activity: Never   Other Topics Concern     Not on file   Social History Narrative    She owns a marketing company. She has 2 adopted children and 2 grandchildren and was  in  after 44 years of marriage (he is bipolar).  She lives alone in an apartment.     Social Determinants of Health     Financial Resource Strain: Not on file   Food Insecurity: Not on file   Transportation Needs: Not on file   Physical Activity: Not on file   Stress: Not on file   Social Connections: Not on file   Intimate Partner Violence: Not on file   Housing Stability: Not on file       Physical Exam:  LMP  (LMP Unknown)     GENERAL APPEARANCE: healthy, alert and no distress     NECK: no adenopathy, no asymmetry or masses     LYMPHATICS: Two enlarged cervical lymph nodes, bilaterally.  No supraclavicular or axillary lymphadenopathy     RESP: lungs clear to auscultation - no rales, rhonchi or wheezes     CARDIOVASCULAR: regular rate and rhythm, normal S1 S2, no S3 or S4 and no murmur.   BREASTS: Examined in a sitting and lying position. R about 20% larger than left.  No visible distortion, swelling or rashes. No nipple inversion, nipple discharge, breast dimpling or puckering. Breast tissue is heterogenous dense to palpation with minor fibrocystic changes, particularly in the right breast in the medal two quadrants. Left axilla, subcutaneous round nodule, likely a cyst arising next to scar tissue.  Right axilla, no findings.  SKIN: Multiple  hyperpigmented dark tan moles scattered throughout torso.  No suspicious lesions or rashes on anterior torso, upper extremities or face.    Laboratory/Imaging Studies  No results found for any visits on 12/29/21.    ASSESSMENT  We discussed her remaining risk for breast cancer (about 25% through age 81), ovarian (2.5% through age 81) and pancreatic cancer (3.71% through age 81) according to literature used in the All Syndromes Known To Man Calculator.  For this reason, I would encourage her to continue with both breast MRI and mammogram if she can. She will have her breast MRI in July at [facility] again and I will have the order faxed over for her.  She will have 2 mg lorazepam for this; I have sent the prescription to her pharmacy.      We discussed that because she had an advanced adenoma on her last colonoscopy in December 2020, I would recommend another colonoscopy soon; I have put an order in for this.  She does also have a history of stomach cancer in her father, which she thinks was in his 30s-40s. At this point, there is no strong recommendation for upper GI screening, but she can also discuss any symptoms with her gastroenterologist.      We reviewed her health promotion habits and her interval history.  She has been losing weight using Noom, and has intentionally lost 11 pounds since June 2020.  She walks 4/x week with friends and is achieving 8000 steps per day, with her goal being 10,000.  She will be taking pickle ball lessons starting in January and we discussed weightlifting and flexibility using the Train with Bhavani reny or the Silver Sneakers program.  She [facility] October 5 [facility] and will be having a skin exam with Dr. Billingsley at [facility] this year.  She states that her intention is that she do everything she can to focus on: 1. Wellness  2. Travel and 3. Relationships and she sees her weight loss and health promotion as fitting well within  these goals.    She also is asking about a prosthesis replacement for her post mastectomy bras;  I am consulting with the [facility] RNs to see which vendor would be recommended for her.    Individualized Surveillance Plan for women  Hereditary Breast and/or Ovarian Cancer Syndrome   Per NCCN Guidelines Version 1.2022   Recommended screening Test or procedure Last done Next Scheduled    Breast self awareness starting at age 18.    Breast cancer risk >60% Women should be familiar with their breasts and promptly report changes to their care provider. Periodic, consistent self exam may facilitate breast self awareness. Premenopausal women may find self exams to be most informative when performed at the end of menses.   12/29/2021 December 2022   Breast screening, starting at age 25 Clinical breast exams every 6 -12 months 12/29/2021 December 2022   Breast screening   Age 25-29 Annual breast MRI screening with contrast (or mammogram if MRI is unavailable) or individualized based on family history if a breast cancer diagnosis before age 30 is present.       Breast MRI is performed preferably on day 7-15 of menstrual cycle for premenopausal women.   See below   See below   Breast screening   Age >30-75 years     Annual mammogram (consider tomosynthesis mammogram) and annual screening MRI.     Breast MRI is performed preferably on day 7-15 of menstrual cycle for premenopausal women.    Age>75 years, management should be considered on an individual basis.   Discuss screening Mammogram after visit today    Breast MRI in June at [facility]    Next exam:  December followed by mammogram   Ovarian cancer screening, starting at age 30-35    Absolute risk for epithelial ovarian cancer -39-58% for BRCA1+ carriers and 13-29% for BRCA2+ carriers   Consider transvaginal ultrasound and  tests.   NA-THBSO   NA   Pancreatic Cancer Screening beginning at age 50 or 10 years prior to the earliest pancreatic cancer on the BRCA-side of  the family  In families with exocrine pancreatic cancer in a first or second degree relative    Risk is <5% for BRCA1+ carriers    5-10% for BRCA2+ carriers     Consider Annual MRI/MRCP and/or Endoscopic Ultrasound   No family history of pancreatic cancer   Review at next visit   Recommendation: risk-reducing salpingo-oophorectomy(RRSO), typically between 35 and 40 years old and  upon completion of child bearing.     Because ovarian cancer onset in patients with BRCA2 mutations is on average of 8-10 years later than in patients with BRCA1 mutations, it is reasonable to delay RRSO until 40-45 years in patients with BRCA2 mutations  unless age at diagnosis in the family warrants earlier age for consideration for prophylactic surgery.    Limited data suggest that there may be a slightly increased risk of serous uterine cancer among women with BRCA1+ pathogenic variants. The provider and the patient should discuss the risks and benefits of a concurrent hysterectomy at the time of RRSO for women with a BRCA1+ pathogenic variant /like pathogenic variant prior to surgery.     Salpingectomy alone is not the standard of care for risk reduction although clinical trials are ongoing.     Discuss option of risk-reducing mastectomy.    Consider risks and benefits of risk reducing agents, such as tamoxifen and raloxifene for breast and ovarian cancer.    Consider a full body skin and eye exam for melanoma screening for both BRCA1+ and BRCA2+.     NOTE: Women with BRCA mutation who are treated for breast cancer should have screening of the remaining breast tissue with annual mammography and breast MRI.       I spent a total of 56 minutes on the day of the visit. Please see the note for further information on patient assessment and treatment.    Estrella Her, JACKSON-CNS, OCN, AGN-BC  Clinical Nurse Specialist  Cancer Risk Management Program  [facility]  phone:  [phone]  fax: [phone]    CC: No primary care  provider on file.                    Again, thank you for allowing me to participate in the care of your patient.        Sincerely,        JACKSON Ward CNS

## 2021-12-30 ENCOUNTER — DOCUMENTATION ONLY (OUTPATIENT)
Dept: ONCOLOGY | Facility: CLINIC | Age: 76
End: 2021-12-30
Payer: COMMERCIAL

## 2021-12-30 NOTE — PROGRESS NOTES
Action 12.30.21  10:21 AM   Action Taken In-basket states the following is needed  -Breast MRI image from [facility] from 9.1.21   -Derm records from Dr. Billingsley, [facility] in [place]    Called [facility] [phone]. They will push image.  Called [facility] at [phone]. They will fax records.    Update: Pulled 9.1.21 breast MRI image into PACS.  Update: Received med recs from [facility]. Sent to urgent scanning and forwarded to Estrella Her.

## 2022-02-19 ENCOUNTER — HEALTH MAINTENANCE LETTER (OUTPATIENT)
Age: 77
End: 2022-02-19

## 2022-10-22 ENCOUNTER — HEALTH MAINTENANCE LETTER (OUTPATIENT)
Age: 77
End: 2022-10-22

## 2022-11-15 ENCOUNTER — TELEPHONE (OUTPATIENT)
Dept: ONCOLOGY | Facility: HOSPITAL | Age: 77
End: 2022-11-15

## 2022-12-20 ENCOUNTER — ONCOLOGY VISIT (OUTPATIENT)
Dept: ONCOLOGY | Facility: CLINIC | Age: 77
End: 2022-12-20
Attending: CLINICAL NURSE SPECIALIST
Payer: COMMERCIAL

## 2022-12-20 VITALS
TEMPERATURE: 98.2 F | RESPIRATION RATE: 16 BRPM | BODY MASS INDEX: 40.24 KG/M2 | OXYGEN SATURATION: 95 % | WEIGHT: 243 LBS | SYSTOLIC BLOOD PRESSURE: 136 MMHG | DIASTOLIC BLOOD PRESSURE: 92 MMHG | HEART RATE: 74 BPM

## 2022-12-20 DIAGNOSIS — F41.9 ANXIETY: ICD-10-CM

## 2022-12-20 DIAGNOSIS — Z15.09 BRCA2 POSITIVE: ICD-10-CM

## 2022-12-20 DIAGNOSIS — Z15.01 BRCA2 POSITIVE: ICD-10-CM

## 2022-12-20 DIAGNOSIS — Z12.39 BREAST CANCER SCREENING, HIGH RISK PATIENT: Primary | ICD-10-CM

## 2022-12-20 DIAGNOSIS — R92.30 DENSE BREAST: ICD-10-CM

## 2022-12-20 DIAGNOSIS — Z12.31 ENCOUNTER FOR SCREENING MAMMOGRAM FOR MALIGNANT NEOPLASM OF BREAST: ICD-10-CM

## 2022-12-20 PROCEDURE — G0463 HOSPITAL OUTPT CLINIC VISIT: HCPCS

## 2022-12-20 PROCEDURE — G0463 HOSPITAL OUTPT CLINIC VISIT: HCPCS | Performed by: CLINICAL NURSE SPECIALIST

## 2022-12-20 PROCEDURE — 99215 OFFICE O/P EST HI 40 MIN: CPT | Performed by: CLINICAL NURSE SPECIALIST

## 2022-12-20 PROCEDURE — 99417 PROLNG OP E/M EACH 15 MIN: CPT | Performed by: CLINICAL NURSE SPECIALIST

## 2022-12-20 RX ORDER — LORAZEPAM 1 MG/1
2 TABLET ORAL
Qty: 1 TABLET | Refills: 1 | Status: SHIPPED | OUTPATIENT
Start: 2022-12-20 | End: 2024-04-08

## 2022-12-20 ASSESSMENT — PAIN SCALES - GENERAL: PAINLEVEL: NO PAIN (0)

## 2022-12-20 NOTE — PATIENT INSTRUCTIONS
Individualized Surveillance Plan for women  Hereditary Breast and/or Ovarian Cancer Syndrome   Per NCCN Guidelines Version 1.2023   Recommended screening Test or procedure Last done Next Scheduled    Breast self awareness starting at age 18.    Breast cancer risk >60% Women should be familiar with their breasts and promptly report changes to their care provider. Periodic, consistent self exam may facilitate breast self awareness. Premenopausal women may find self exams to be most informative when performed at the end of menses.   12/20/2022 July 2023   Breast screening, starting at age 25 Clinical breast exams every 6 -12 months 12/20/2022 July 2023   Breast screening   Age 25-29 Annual breast MRI screening with contrast (or mammogram if MRI is unavailable) or individualized based on family history if a breast cancer diagnosis before age 30 is present.       Breast MRI is performed preferably on day 7-15 of menstrual cycle for premenopausal women.     NA     NA   Breast screening   Age >30-75 years     Annual mammogram (consider tomosynthesis mammogram) and annual screening MRI.     Breast MRI is performed preferably on day 7-15 of menstrual cycle for premenopausal women.    Age>75 years, management should be considered on an individual basis. 9/1//2021- Breast MRI, BiRads2    12/29/2021- Screening tomosynthesis mammogram, BiRads1 Breast MRI in January    Return in July with mammogram after   Ovarian cancer screening, starting at age 30-35    Absolute risk for epithelial ovarian cancer -39-58% for BRCA1+ carriers and 13-29% for BRCA2+ carriers   Consider transvaginal ultrasound and  tests.     NA     NA   Pancreatic Cancer Screening beginning at age 50 or 10 years prior to the earliest pancreatic cancer on the BRCA-side of the family  In families with exocrine pancreatic cancer in a first or second degree relative    Risk is <5% for BRCA1+ carriers    5-10% for BRCA2+ carriers     Consider Annual MRI/MRCP  and/or Endoscopic Ultrasound   No family history of pancreatic cancer   Review at next visit   Recommendation: risk-reducing salpingo-oophorectomy(RRSO), typically between 35 and 40 years old and  upon completion of child bearing.     Because ovarian cancer onset in patients with BRCA2 mutations is on average of 8-10 years later than in patients with BRCA1 mutations, it is reasonable to delay RRSO until 40-45 years in patients with BRCA2 mutations  unless age at diagnosis in the family warrants earlier age for consideration for prophylactic surgery.    Limited data suggest that there may be a slightly increased risk of serous uterine cancer among women with BRCA1+ pathogenic variants. The provider and the patient should discuss the risks and benefits of a concurrent hysterectomy at the time of RRSO for women with a BRCA1+ pathogenic variant /like pathogenic variant prior to surgery.     Salpingectomy alone is not the standard of care for risk reduction although clinical trials are ongoing.     Discuss option of risk-reducing mastectomy.    Consider risks and benefits of risk reducing agents, such as tamoxifen and raloxifene for breast and ovarian cancer.    Consider a full body skin and eye exam for melanoma screening for both BRCA1+ and BRCA2+.     NOTE: Women with BRCA mutation who are treated for breast cancer should have screening of the remaining breast tissue with annual mammography and breast MRI.

## 2022-12-20 NOTE — LETTER
2022         RE: Aida Alberts  [address]  [facility] [address]        Dear Colleague,    Thank you for referring your patient, Aida Alberts, to the [facility]. Please see a copy of my visit note below.    Oncology Risk Management Consultation:  Date on this visit: 2022    Aida Alberts  returns to the Cancer Risk Management Program today at the [facility] for follow up.  She requires screening and surveillance to minimize her risk of cancer secondary to a deleterious BRCA2 mutation.  She is considered to be at high risk for hereditary breast and ovarian cancer. She has a history of triple negative breast cancer diagnosed in  status post lumpectomy, adjuvant chemotherapy and radiation therapy. She is followed by Dr. Hernández for this.      Primary Physician: Anna Jackson PA-C    History Of Present Illness:  Ms. 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       Genetic Testin2015 - Positive for a deleterious BRCA2+, deletions of exons 5-7, which is the same mutation identified in her sister who was diagnosed with stage IV ovarian cancer. The testing was done using a single site genetic test from [employer].     Pertinent history:  Menarche (age unknown)  Nulliparous.  Menopause in 40s.  Breast density: Heterogeneously dense.  History of triple negative breast cancer diagnosed in  located on the right side measuring 1.6 cm in size presenting as a mammogram otherwise completely asymptomatic for which she had lumpectomy and sentinel lymph node biopsy. Gregory lymph node biopsy did show some positive cells on immunohistochemistry in her lymph node. Subsequent to that she underwent adjuvant chemotherapy with a dose dense Adriamycin and cyclophosphamide for 4 cycles. Then she went on to have radiation therapy.  Subsequently,  at [facility] they discovered that the lymph node might have been positive so she was given adjuvant Taxol therapy as well.     2/23/2016 [facility] pathology showed benign endometrial polyp (2.7 cm), benign submucosal, intramural and subserosal leiomyomata (6; up to 0.9 cm), deep adenomyosis, endometriosis, uterine serosa, bilateral ovaries and fallopian tubes with no evidence of dysplasia or malignancy.  Mild chronic cervicitis.  No evidence of cervical dysplasia.     History of OCPs: No  History of HRT: No     Pertinent screening history:  5/15/2000- Mammogram  5/18/2000 - bilateral breast ultrasound  6/5/2002- Mammogram -  6/20/2003- Mammogram  1/31/2013 - Breast imaging ([facility]?)  1/2/2015 - Screening mammogram, BiRads0 for 1.2 cm partially circumscribed mass in upper outer left breast at 2:00.   1/9/2015 - Left breast US, BiRads3, benign cyst and probable cluster of benign microcysts at 2:00 within zone 2-3  5/28/2015 - Left breast US, BiRads3, Cluster of probable benign microcysts 2:00 zone 3, located approximately 7 cm from the nipple, have decreased slightly in size.  6/25/2015 - Mammogram ([facility])  7/24/2015 - Breast MRI, BiRads2.  2/19/2016 - Mammogram ([facility])  2/19/2016 - Left breast US and Diagnostic tomosynthesis mammogram both, BiRads 0 for 2:00 position zone 2 demonstrates a hypoechoic mass 1.5 x 0.7 cm. This most likely represents a cyst. internal echoes within the mass.   2/22/2016 - Breast MRI, BiRads2, likely benign cyst in left breast.  8/22/2016 Screening mammogram, BiRads2  3/6/2017 -Breast MRI, BiRads2.  9/8/2017 - Screening mammogram, BiRads2  4/20/2018 - Breast MRI, BiRads4,  LEFT BREAST: Regional area of clumped retroareolar plane enhancement 4.2 x 1.2 cm for which MR-guided biopsy is recommended as this is indeterminate.  5/11/2018 - Left breast biopsy, Pathology reveals fibrocystic change.  10/7/2019 - Screening mammogram, BiRads2.  6/18/2020- Breast MRI, BiRads2  12/18/2020- Screening  tomosynthesis mammogram, BiRads2  9/1//2021- Breast MRI, BiRads2  12/29/2021- Screening tomosynthesis mammogram, BiRads1     3/26/2012- Colonoscopy, 5 mm tubular adenoma in cecum, 16-20 mm tubulovillous adenoma in ascending colon, 5-6 mm tubular adenoma in transverse colon  5/8/2013-Colonoscopy, 6 mm sessile serrated adenoma in cecum  10/17/2016 - Colonoscopy, Three 4-10 mm tubular adenomas in the ascending colon, three 3-5 mm tubular adenomas in descending colon, 4-5 mm tubular adenoma   11/17/2017 - Colonoscopy, One 3 mm tubular adenoma in cecum  12/21/2020- Colonoscopy (Dr. Kwong, [facility], [facility]), advanced tubular adenoma (11-15 mm) in descending colon; negative for atypia and malignancy     At this visit, she denies new fatigue, breast pain, asymmetry, lumps, masses, thickening, nipple discharge and skin changes in her breasts. She denies any urinary urgency, frequency, abdominal pain, bloating and early satiety.    Past Medical/Surgical History:  Past Medical History:   Diagnosis Date     BRCA2 positive 01/22/2015    deletion of exons 5-7, identified using single site testing through [employer]     Breast cancer (H) 01/01/2002     Colon polyp     patient reports about 6 adenomas     Colonic adenoma     5-6 advanced adenomas in 2013 colonoscopy     Fibrocystic breast      Hx antineoplastic chemotherapy      Hx of radiation therapy      Hyperlipidemia      Malignant neoplasm of upper-outer quadrant of right breast in female, estrogen receptor negative (H) 2002    infiltrating ductal carcinoma w/DCIS, Stage IIA (T1c, N1, cM0, Free text: ER-ve,UT-ve,Exd6wvp -ve)     Past Surgical History:   Procedure Laterality Date     BIOPSY BREAST  01/01/2018    at [facility]     COLONOSCOPY       EXCISE BREAST CYST/FIBROADENOMA/TUMOR/DUCT LESION/NIPPLE LESION/AREOLAR LESION      Description: Breast Surgery Lumpectomy;  Recorded: 01/07/2011;  Comments: Breast cancer, 2005     HYSTERECTOMY  02/23/2016      LUMPECTOMY BREAST       OOPHORECTOMY         Allergies:  Allergies as of 2022 - Reviewed 2022   Allergen Reaction Noted     Cefuroxime Hives 2018     Amoxicillin-pot clavulanate Nausea and Vomiting 02/15/2016       Current Medications:  Current Outpatient Medications   Medication Sig Dispense Refill     acetaminophen (TYLENOL) 325 MG tablet Take 325-650 mg by mouth       Cholecalciferol (VITAMIN D3) 2000 UNITS CAPS Take 2,000 Units by mouth       diazepam (VALIUM) 5 MG tablet Take 5-10 mg by mouth       fluticasone (FLONASE) 50 MCG/ACT spray Spray 2 sprays in nostril       LORazepam (ATIVAN) 1 MG tablet Take 2 tablets (2 mg) by mouth once as needed for anxiety (prior to breast MRI) Take with you to breast MRI .  Do not operate a vehicle after taking this medication 1 tablet 1     LORazepam (ATIVAN) 1 MG tablet Take 2 tablets (2 mg) by mouth once as needed for anxiety (prior to breast MRI) Take 30 minutes prior to departure.  Do not operate a vehicle after taking this medication 2 tablet 0     LORazepam (ATIVAN) 2 MG tablet Take 30 minutes prior to departure for breast MRI.  Do not operate a vehicle after taking this medication 1 tablet 0     magnesium 250 MG tablet Take 1 tablet by mouth       rosuvastatin (CRESTOR) 10 MG tablet Take 20 mg by mouth           Family History:  Family History   Problem Relation Age of Onset     Heart Failure Mother          age 96     Heart Disease Father          in his 60s     Stomach Cancer Father         3/4 stomach removed at [facility]; possibly 30s or 40s     Ovarian Cancer Sister 77        endometrioid, grade 2     Hereditary Breast and Ovarian Cancer Syndrome Sister         BRCA2 positive, del exons 5-7, [facility] lab     Melanoma Sister 29     Hereditary Breast and Ovarian Cancer Syndrome Sister      Hereditary Breast and Ovarian Cancer Syndrome Sister      Heart Disease Sister         HOCM     Uterine Cancer Sister 76         age 76      Hereditary Breast and Ovarian Cancer Syndrome Brother      Leukemia Maternal Grandmother         chronic typle     Lung Cancer Paternal Uncle 60     Breast Cancer Niece 40        3 occurrences     Hereditary Breast and Ovarian Cancer Syndrome Niece         no cancer       Social History:  Social History     Socioeconomic History     Marital status:      Spouse name: NA     Number of children: 2     Years of education: Not on file     Highest education level: Not on file   Occupational History     Occupation: Retired     Comment: [employer] [Other]   Tobacco Use     Smoking status: Former     Packs/day: 0.50     Years: 17.00     Pack years: 8.50     Types: Cigarettes     Start date: 1/3/1965     Quit date: 2008     Years since quittin.9     Smokeless tobacco: Never     Tobacco comments:     1 pack a week until    Substance and Sexual Activity     Alcohol use: Yes     Alcohol/week: 7.0 standard drinks     Drug use: No     Sexual activity: Never   Other Topics Concern     Not on file   Social History Narrative    She previously owned a marketing company. She has 2 adopted children and 2 grandchildren and was  in  after 44 years of marriage (he is bipolar).  She lives alone in an apartment.     Social Determinants of Health     Financial Resource Strain: Not on file   Food Insecurity: Not on file   Transportation Needs: Not on file   Physical Activity: Not on file   Stress: Not on file   Social Connections: Not on file   Intimate Partner Violence: Not At Risk     Fear of Current or Ex-Partner: No     Emotionally Abused: No     Physically Abused: No     Sexually Abused: No   Housing Stability: Not on file       Physical Exam:  BP (!) 136/92 (BP Location: Right arm, Patient Position: Sitting, Cuff Size: Adult Large)   Pulse 74   Temp 98.2  F (36.8  C) (Oral)   Resp 16   Wt 110.2 kg (243 lb)   LMP  (LMP Unknown)   SpO2 95%   BMI 40.24 kg/m      GENERAL APPEARANCE: healthy,  alert and no distress     NECK: no adenopathy, no asymmetry or masses     LYMPHATICS: No cervical, supraclavicular or axillary  lymphadenopathy     RESP: lungs clear to auscultation - no rales, rhonchi or wheezes     CARDIOVASCULAR: regular rate and rhythm, normal S1 S2, no S3 or S4 and no murmur.   BREASTS: Examined in a sitting and lying position. L>R, with well healed scars without visible distortion, swelling or rashes. No nipple inversion, nipple discharge, breast dimpling or puckering. Breast tissue is soft to palpation.Axillary area without masses or lymphadenopathy.  SKIN: no suspicious lesions or rashes, multiple, brown scattered moles on anterior torso, predominant under left breast.    Laboratory/Imaging Studies  No results found for any visits on 12/20/22.    ASSESSMENT  We reviewed her family and interval history.  She has been doing well and enjoyed traveling in 2022, now that she is officially retired and has sold her company. We updated her family history and discussed concerns about cancer screening. She should have a colonoscopy by the end of 2023 and is going to schedule that soon.  Because of her travel schedule and complications with her attempted prior breast MRI, she will now schedule her breast MRI at [facility] (I have faxed the order to [facility]) and will use 2 mg lorazepam for that.  I have sent that prescription to the [facility] pharmacy she requested.    She has an appointment with [facility] for routine screening and will schedule her eye exam. She has no pancreatic cancer in her family but does complain of belching frequently. We discussed that she could discuss her GI concerns with either the gastroenterologist who will be performing her colonoscopy or her primary care provider and they may find her a candidate for h.pylori testing, considering that there is stomach cancer in her family.  She is a former smoker with an 8.5 pack year history; typically a low dose chest CT is  indicated for those with a 20 pack year history, who have quit smoking within the last 15 years.    I will see her in July for her next exam followed by a mammogram.    Individualized Surveillance Plan for women  Hereditary Breast and/or Ovarian Cancer Syndrome   Per NCCN Guidelines Version 1.2023   Recommended screening Test or procedure Last done Next Scheduled    Breast self awareness starting at age 18.    Breast cancer risk >60% Women should be familiar with their breasts and promptly report changes to their care provider. Periodic, consistent self exam may facilitate breast self awareness. Premenopausal women may find self exams to be most informative when performed at the end of menses.   12/20/2022 July 2023   Breast screening, starting at age 25 Clinical breast exams every 6 -12 months 12/20/2022 July 2023   Breast screening   Age 25-29 Annual breast MRI screening with contrast (or mammogram if MRI is unavailable) or individualized based on family history if a breast cancer diagnosis before age 30 is present.       Breast MRI is performed preferably on day 7-15 of menstrual cycle for premenopausal women.     NA     NA   Breast screening   Age >30-75 years     Annual mammogram (consider tomosynthesis mammogram) and annual screening MRI.     Breast MRI is performed preferably on day 7-15 of menstrual cycle for premenopausal women.    Age>75 years, management should be considered on an individual basis. 9/1//2021- Breast MRI, BiRads2    12/29/2021- Screening tomosynthesis mammogram, BiRads1 Breast MRI in January    Return in July with mammogram after the visit   Ovarian cancer screening, starting at age 30-35    Absolute risk for epithelial ovarian cancer -39-58% for BRCA1+ carriers and 13-29% for BRCA2+ carriers   Consider transvaginal ultrasound and  tests.     NA     NA   Pancreatic Cancer Screening beginning at age 50 or 10 years prior to the earliest pancreatic cancer on the BRCA-side of the  family  In families with exocrine pancreatic cancer in a first or second degree relative    Risk is <5% for BRCA1+ carriers    5-10% for BRCA2+ carriers     Consider Annual MRI/MRCP and/or Endoscopic Ultrasound   No family history of pancreatic cancer   Review at next visit   Recommendation: risk-reducing salpingo-oophorectomy(RRSO), typically between 35 and 40 years old and  upon completion of child bearing.     Because ovarian cancer onset in patients with BRCA2 mutations is on average of 8-10 years later than in patients with BRCA1 mutations, it is reasonable to delay RRSO until 40-45 years in patients with BRCA2 mutations  unless age at diagnosis in the family warrants earlier age for consideration for prophylactic surgery.    Limited data suggest that there may be a slightly increased risk of serous uterine cancer among women with BRCA1+ pathogenic variants. The provider and the patient should discuss the risks and benefits of a concurrent hysterectomy at the time of RRSO for women with a BRCA1+ pathogenic variant /like pathogenic variant prior to surgery.     Salpingectomy alone is not the standard of care for risk reduction although clinical trials are ongoing.     Discuss option of risk-reducing mastectomy.    Consider risks and benefits of risk reducing agents, such as tamoxifen and raloxifene for breast and ovarian cancer.    Consider a full body skin and eye exam for melanoma screening for both BRCA1+ and BRCA2+.     NOTE: Women with BRCA mutation who are treated for breast cancer should have screening of the remaining breast tissue with annual mammography and breast MRI.       I spent a total of 71 minutes on the day of the visit. Please see the note for further information on patient assessment and treatment.    Estrella Her, APRN-CNS, OCN, AGN-BC  Clinical Nurse Specialist  Cancer Risk Management Program  [facility]      CC: ELOISA Burgos MD

## 2022-12-20 NOTE — NURSING NOTE
"Oncology Rooming Note    December 20, 2022 12:06 PM   Aida Alberts is a 77 year old female who presents for:    Chief Complaint   Patient presents with     Oncology Clinic Visit     Breast Cancer (R)     Initial Vitals: BP (!) 136/92 (BP Location: Right arm, Patient Position: Sitting, Cuff Size: Adult Large)   Pulse 74   Temp 98.2  F (36.8  C) (Oral)   Resp 16   Wt 110.2 kg (243 lb)   LMP  (LMP Unknown)   SpO2 95%   BMI 40.24 kg/m   Estimated body mass index is 40.24 kg/m  as calculated from the following:    Height as of 1/3/20: 1.655 m (5' 5.16\").    Weight as of this encounter: 110.2 kg (243 lb). Body surface area is 2.25 meters squared.  No Pain (0) Comment: Data Unavailable   No LMP recorded (lmp unknown). Patient is postmenopausal.  Allergies reviewed: Yes  Medications reviewed: No    Medications: Medication refills not needed today.  Pharmacy name entered into [employer]:    [id number]  [place] - [address] [id number] - [place], [place] - [address] AT [id number] [place], [place] - [address] [address]    Clinical concerns: Pt presents today for f/u.       Mary Morris LPN  12/20/2022              "

## 2022-12-20 NOTE — PROGRESS NOTES
Oncology Risk Management Consultation:  Date on this visit: 2022    Aida Alberts  returns to the Cancer Risk Management Program today at the [facility] for follow up.  She requires screening and surveillance to minimize her risk of cancer secondary to a deleterious BRCA2 mutation.  She is considered to be at high risk for hereditary breast and ovarian cancer. She has a history of triple negative breast cancer diagnosed in  status post lumpectomy, adjuvant chemotherapy and radiation therapy. She is followed by Dr. Hernández for this.      Primary Physician: Anna Jackson PA-C    History Of Present Illness:  Ms. 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       Genetic Testin2015 - Positive for a deleterious BRCA2+, deletions of exons 5-7, which is the same mutation identified in her sister who was diagnosed with stage IV ovarian cancer. The testing was done using a single site genetic test from [facility].     Pertinent history:  Menarche (age unknown)  Nulliparous.  Menopause in 40s.  Breast density: Heterogeneously dense.  History of triple negative breast cancer diagnosed in  located on the right side measuring 1.6 cm in size presenting as a mammogram otherwise completely asymptomatic for which she had lumpectomy and sentinel lymph node biopsy. [place] lymph node biopsy did show some positive cells on immunohistochemistry in her lymph node. Subsequent to that she underwent adjuvant chemotherapy with a dose dense Adriamycin and cyclophosphamide for 4 cycles. Then she went on to have radiation therapy. Subsequently,  at [facility] they discovered that the lymph node might have been positive so she was given adjuvant Taxol therapy as well.     2016 [facility] pathology showed benign endometrial polyp (2.7 cm), benign submucosal, intramural and subserosal leiomyomata (6; up to  0.9 cm), deep adenomyosis, endometriosis, uterine serosa, bilateral ovaries and fallopian tubes with no evidence of dysplasia or malignancy.  Mild chronic cervicitis.  No evidence of cervical dysplasia.     History of OCPs: No  History of HRT: No     Pertinent screening history:  5/15/2000- Mammogram  5/18/2000 - bilateral breast ultrasound  6/5/2002- Mammogram -  6/20/2003- Mammogram  1/31/2013 - Breast imaging ([facility]?)  1/2/2015 - Screening mammogram, BiRads0 for 1.2 cm partially circumscribed mass in upper outer left breast at 2:00.   1/9/2015 - Left breast US, BiRads3, benign cyst and probable cluster of benign microcysts at 2:00 within zone 2-3  5/28/2015 - Left breast US, BiRads3, Cluster of probable benign microcysts 2:00 zone 3, located approximately 7 cm from the nipple, have decreased slightly in size.  6/25/2015 - Mammogram ([facility])  7/24/2015 - Breast MRI, BiRads2.  2/19/2016 - Mammogram ([facility])  2/19/2016 - Left breast US and Diagnostic tomosynthesis mammogram both, BiRads 0 for 2:00 position zone 2 demonstrates a hypoechoic mass 1.5 x 0.7 cm. This most likely represents a cyst. internal echoes within the mass.   2/22/2016 - Breast MRI, BiRads2, likely benign cyst in left breast.  8/22/2016 Screening mammogram, BiRads2  3/6/2017 -Breast MRI, BiRads2.  9/8/2017 - Screening mammogram, BiRads2  4/20/2018 - Breast MRI, BiRads4,  LEFT BREAST: Regional area of clumped retroareolar plane enhancement 4.2 x 1.2 cm for which MR-guided biopsy is recommended as this is indeterminate.  5/11/2018 - Left breast biopsy, Pathology reveals fibrocystic change.  10/7/2019 - Screening mammogram, BiRads2.  6/18/2020- Breast MRI, BiRads2  12/18/2020- Screening tomosynthesis mammogram, BiRads2  9/1//2021- Breast MRI, BiRads2  12/29/2021- Screening tomosynthesis mammogram, BiRads1     3/26/2012- Colonoscopy, 5 mm tubular adenoma in cecum, 16-20 mm tubulovillous adenoma in ascending colon, 5-6 mm tubular adenoma in transverse  colon  5/8/2013-Colonoscopy, 6 mm sessile serrated adenoma in cecum  10/17/2016 - Colonoscopy, Three 4-10 mm tubular adenomas in the ascending colon, three 3-5 mm tubular adenomas in descending colon, 4-5 mm tubular adenoma   11/17/2017 - Colonoscopy, One 3 mm tubular adenoma in cecum  12/21/2020- Colonoscopy (Dr. Kwong, [facility], [facility]), advanced tubular adenoma (11-15 mm) in descending colon; negative for atypia and malignancy     At this visit, she denies new fatigue, breast pain, asymmetry, lumps, masses, thickening, nipple discharge and skin changes in her breasts. She denies any urinary urgency, frequency, abdominal pain, bloating and early satiety.    Past Medical/Surgical History:  Past Medical History:   Diagnosis Date     BRCA2 positive 01/22/2015    deletion of exons 5-7, identified using single site testing through [facility]     Breast cancer (H) 01/01/2002     Colon polyp     patient reports about 6 adenomas     Colonic adenoma     5-6 advanced adenomas in 2013 colonoscopy     Fibrocystic breast      Hx antineoplastic chemotherapy      Hx of radiation therapy      Hyperlipidemia      Malignant neoplasm of upper-outer quadrant of right breast in female, estrogen receptor negative (H) 2002    infiltrating ductal carcinoma w/DCIS, Stage IIA (T1c, N1, cM0, Free text: ER-ve,OR-ve,[id number] -ve)     Past Surgical History:   Procedure Laterality Date     BIOPSY BREAST  01/01/2018    at [facility]     COLONOSCOPY       EXCISE BREAST CYST/FIBROADENOMA/TUMOR/DUCT LESION/NIPPLE LESION/AREOLAR LESION      Description: Breast Surgery Lumpectomy;  Recorded: 01/07/2011;  Comments: Breast cancer, 2005     HYSTERECTOMY  02/23/2016     LUMPECTOMY BREAST       OOPHORECTOMY         Allergies:  Allergies as of 12/20/2022 - Reviewed 12/20/2022   Allergen Reaction Noted     Cefuroxime Hives 06/02/2018     Amoxicillin-pot clavulanate Nausea and Vomiting 02/15/2016       Current Medications:  Current  Outpatient Medications   Medication Sig Dispense Refill     acetaminophen (TYLENOL) 325 MG tablet Take 325-650 mg by mouth       Cholecalciferol (VITAMIN D3) 2000 UNITS CAPS Take 2,000 Units by mouth       diazepam (VALIUM) 5 MG tablet Take 5-10 mg by mouth       fluticasone (FLONASE) 50 MCG/ACT spray Spray 2 sprays in nostril       LORazepam (ATIVAN) 1 MG tablet Take 2 tablets (2 mg) by mouth once as needed for anxiety (prior to breast MRI) Take with you to breast MRI .  Do not operate a vehicle after taking this medication 1 tablet 1     LORazepam (ATIVAN) 1 MG tablet Take 2 tablets (2 mg) by mouth once as needed for anxiety (prior to breast MRI) Take 30 minutes prior to departure.  Do not operate a vehicle after taking this medication 2 tablet 0     LORazepam (ATIVAN) 2 MG tablet Take 30 minutes prior to departure for breast MRI.  Do not operate a vehicle after taking this medication 1 tablet 0     magnesium 250 MG tablet Take 1 tablet by mouth       rosuvastatin (CRESTOR) 10 MG tablet Take 20 mg by mouth           Family History:  Family History   Problem Relation Age of Onset     Heart Failure Mother          age 96     Heart Disease Father          in his 60s     Stomach Cancer Father         3/4 stomach removed at [facility]; possibly 30s or 40s     Ovarian Cancer Sister 77        endometrioid, grade 2     Hereditary Breast and Ovarian Cancer Syndrome Sister         BRCA2 positive, del exons 5-7, [facility]     Melanoma Sister 29     Hereditary Breast and Ovarian Cancer Syndrome Sister      Hereditary Breast and Ovarian Cancer Syndrome Sister      Heart Disease Sister         HOCM     Uterine Cancer Sister 76         age 76     Hereditary Breast and Ovarian Cancer Syndrome Brother      Leukemia Maternal Grandmother         chronic typle     Lung Cancer Paternal Uncle 60     Breast Cancer Niece 40        3 occurrences     Hereditary Breast and Ovarian Cancer Syndrome Niece         no cancer        Social History:  Social History     Socioeconomic History     Marital status:      Spouse name: NA     Number of children: 2     Years of education: Not on file     Highest education level: Not on file   Occupational History     Occupation: Retired     Comment: [employer] [Other]   Tobacco Use     Smoking status: Former     Packs/day: 0.50     Years: 17.00     Pack years: 8.50     Types: Cigarettes     Start date: 1/3/1965     Quit date: 2008     Years since quittin.9     Smokeless tobacco: Never     Tobacco comments:     1 pack a week until    Substance and Sexual Activity     Alcohol use: Yes     Alcohol/week: 7.0 standard drinks     Drug use: No     Sexual activity: Never   Other Topics Concern     Not on file   Social History Narrative    She previously owned a marketing company. She has 2 adopted children and 2 grandchildren and was  in  after 44 years of marriage (he is bipolar).  She lives alone in an apartment.     Social Determinants of Health     Financial Resource Strain: Not on file   Food Insecurity: Not on file   Transportation Needs: Not on file   Physical Activity: Not on file   Stress: Not on file   Social Connections: Not on file   Intimate Partner Violence: Not At Risk     Fear of Current or Ex-Partner: No     Emotionally Abused: No     Physically Abused: No     Sexually Abused: No   Housing Stability: Not on file       Physical Exam:  BP (!) 136/92 (BP Location: Right arm, Patient Position: Sitting, Cuff Size: Adult Large)   Pulse 74   Temp 98.2  F (36.8  C) (Oral)   Resp 16   Wt 110.2 kg (243 lb)   LMP  (LMP Unknown)   SpO2 95%   BMI 40.24 kg/m      GENERAL APPEARANCE: healthy, alert and no distress     NECK: no adenopathy, no asymmetry or masses     LYMPHATICS: No cervical, supraclavicular or axillary  lymphadenopathy     RESP: lungs clear to auscultation - no rales, rhonchi or wheezes     CARDIOVASCULAR: regular rate and rhythm, normal  S1 S2, no S3 or S4 and no murmur.   BREASTS: Examined in a sitting and lying position. L>R, with well healed scars without visible distortion, swelling or rashes. No nipple inversion, nipple discharge, breast dimpling or puckering. Breast tissue is soft to palpation.Axillary area without masses or lymphadenopathy.  SKIN: no suspicious lesions or rashes, multiple, brown scattered moles on anterior torso, predominant under left breast.    Laboratory/Imaging Studies  No results found for any visits on 12/20/22.    ASSESSMENT  We reviewed her family and interval history.  She has been doing well and enjoyed traveling in 2022, now that she is officially retired and has sold her company. We updated her family history and discussed concerns about cancer screening. She should have a colonoscopy by the end of 2023 and is going to schedule that soon.  Because of her travel schedule and complications with her attempted prior breast MRI, she will now schedule her breast MRI at [facility] (I have faxed the order to [facility]) and will use 2 mg lorazepam for that.  I have sent that prescription to the [facility] pharmacy she requested.    She has an appointment with [facility] for routine screening and will schedule her eye exam. She has no pancreatic cancer in her family but does complain of belching frequently. We discussed that she could discuss her GI concerns with either the gastroenterologist who will be performing her colonoscopy or her primary care provider and they may find her a candidate for h.pylori testing, considering that there is stomach cancer in her family.  She is a former smoker with an 8.5 pack year history; typically a low dose chest CT is indicated for those with a 20 pack year history, who have quit smoking within the last 15 years.    I will see her in July for her next exam followed by a mammogram.    Individualized Surveillance Plan for women  Hereditary Breast and/or Ovarian Cancer Syndrome   Per NCCN  Guidelines Version 1.2023   Recommended screening Test or procedure Last done Next Scheduled    Breast self awareness starting at age 18.    Breast cancer risk >60% Women should be familiar with their breasts and promptly report changes to their care provider. Periodic, consistent self exam may facilitate breast self awareness. Premenopausal women may find self exams to be most informative when performed at the end of menses.   12/20/2022 July 2023   Breast screening, starting at age 25 Clinical breast exams every 6 -12 months 12/20/2022 July 2023   Breast screening   Age 25-29 Annual breast MRI screening with contrast (or mammogram if MRI is unavailable) or individualized based on family history if a breast cancer diagnosis before age 30 is present.       Breast MRI is performed preferably on day 7-15 of menstrual cycle for premenopausal women.     NA     NA   Breast screening   Age >30-75 years     Annual mammogram (consider tomosynthesis mammogram) and annual screening MRI.     Breast MRI is performed preferably on day 7-15 of menstrual cycle for premenopausal women.    Age>75 years, management should be considered on an individual basis. 9/1//2021- Breast MRI, BiRads2    12/29/2021- Screening tomosynthesis mammogram, BiRads1 Breast MRI in January    Return in July with mammogram after the visit   Ovarian cancer screening, starting at age 30-35    Absolute risk for epithelial ovarian cancer -39-58% for BRCA1+ carriers and 13-29% for BRCA2+ carriers   Consider transvaginal ultrasound and  tests.     NA     NA   Pancreatic Cancer Screening beginning at age 50 or 10 years prior to the earliest pancreatic cancer on the BRCA-side of the family  In families with exocrine pancreatic cancer in a first or second degree relative    Risk is <5% for BRCA1+ carriers    5-10% for BRCA2+ carriers     Consider Annual MRI/MRCP and/or Endoscopic Ultrasound   No family history of pancreatic cancer   Review at next visit    Recommendation: risk-reducing salpingo-oophorectomy(RRSO), typically between 35 and 40 years old and  upon completion of child bearing.     Because ovarian cancer onset in patients with BRCA2 mutations is on average of 8-10 years later than in patients with BRCA1 mutations, it is reasonable to delay RRSO until 40-45 years in patients with BRCA2 mutations  unless age at diagnosis in the family warrants earlier age for consideration for prophylactic surgery.    Limited data suggest that there may be a slightly increased risk of serous uterine cancer among women with BRCA1+ pathogenic variants. The provider and the patient should discuss the risks and benefits of a concurrent hysterectomy at the time of RRSO for women with a BRCA1+ pathogenic variant /like pathogenic variant prior to surgery.     Salpingectomy alone is not the standard of care for risk reduction although clinical trials are ongoing.     Discuss option of risk-reducing mastectomy.    Consider risks and benefits of risk reducing agents, such as tamoxifen and raloxifene for breast and ovarian cancer.    Consider a full body skin and eye exam for melanoma screening for both BRCA1+ and BRCA2+.     NOTE: Women with BRCA mutation who are treated for breast cancer should have screening of the remaining breast tissue with annual mammography and breast MRI.       I spent a total of 71 minutes on the day of the visit. Please see the note for further information on patient assessment and treatment.    Estrella Her, APRN-CNS, OCN, AGN-BC  Clinical Nurse Specialist  Cancer Risk Management Program  [place]      CC: ELOISA Burgos MD

## 2023-01-22 ENCOUNTER — HOSPITAL ENCOUNTER (EMERGENCY)
Facility: CLINIC | Age: 78
Discharge: HOME | End: 2023-01-22
Attending: PHYSICIAN ASSISTANT | Admitting: PHYSICIAN ASSISTANT
Payer: COMMERCIAL

## 2023-01-22 ENCOUNTER — APPOINTMENT (OUTPATIENT)
Dept: GENERAL RADIOLOGY | Facility: CLINIC | Age: 78
End: 2023-01-22
Attending: PHYSICIAN ASSISTANT
Payer: COMMERCIAL

## 2023-01-22 VITALS
RESPIRATION RATE: 22 BRPM | WEIGHT: 245.15 LBS | SYSTOLIC BLOOD PRESSURE: 149 MMHG | OXYGEN SATURATION: 94 % | HEIGHT: 65 IN | HEART RATE: 85 BPM | TEMPERATURE: 98.1 F | DIASTOLIC BLOOD PRESSURE: 89 MMHG | BODY MASS INDEX: 40.84 KG/M2

## 2023-01-22 DIAGNOSIS — I47.9 TACHYCARDIA, PAROXYSMAL (H): ICD-10-CM

## 2023-01-22 LAB
ANION GAP SERPL CALCULATED.3IONS-SCNC: 13 MMOL/L (ref 7–15)
BASOPHILS # BLD AUTO: 0 10E3/UL (ref 0–0.2)
BASOPHILS NFR BLD AUTO: 0 %
BUN SERPL-MCNC: 19.6 MG/DL (ref 8–23)
CALCIUM SERPL-MCNC: 9.5 MG/DL (ref 8.8–10.2)
CHLORIDE SERPL-SCNC: 101 MMOL/L (ref 98–107)
CREAT SERPL-MCNC: 0.54 MG/DL (ref 0.51–0.95)
DEPRECATED HCO3 PLAS-SCNC: 26 MMOL/L (ref 22–29)
EOSINOPHIL # BLD AUTO: 0.1 10E3/UL (ref 0–0.7)
EOSINOPHIL NFR BLD AUTO: 1 %
ERYTHROCYTE [DISTWIDTH] IN BLOOD BY AUTOMATED COUNT: 13.7 % (ref 10–15)
GFR SERPL CREATININE-BSD FRML MDRD: >90 ML/MIN/1.73M2
GLUCOSE SERPL-MCNC: 86 MG/DL (ref 70–99)
HCT VFR BLD AUTO: 49.3 % (ref 35–47)
HGB BLD-MCNC: 16.1 G/DL (ref 11.7–15.7)
IMM GRANULOCYTES # BLD: 0 10E3/UL
IMM GRANULOCYTES NFR BLD: 0 %
LYMPHOCYTES # BLD AUTO: 1.5 10E3/UL (ref 0.8–5.3)
LYMPHOCYTES NFR BLD AUTO: 17 %
MAGNESIUM SERPL-MCNC: 2.1 MG/DL (ref 1.7–2.3)
MCH RBC QN AUTO: 31 PG (ref 26.5–33)
MCHC RBC AUTO-ENTMCNC: 32.7 G/DL (ref 31.5–36.5)
MCV RBC AUTO: 95 FL (ref 78–100)
MONOCYTES # BLD AUTO: 0.8 10E3/UL (ref 0–1.3)
MONOCYTES NFR BLD AUTO: 9 %
NEUTROPHILS # BLD AUTO: 6.3 10E3/UL (ref 1.6–8.3)
NEUTROPHILS NFR BLD AUTO: 73 %
NRBC # BLD AUTO: 0 10E3/UL
NRBC BLD AUTO-RTO: 0 /100
PLATELET # BLD AUTO: 242 10E3/UL (ref 150–450)
POTASSIUM SERPL-SCNC: 4.2 MMOL/L (ref 3.4–5.3)
RBC # BLD AUTO: 5.2 10E6/UL (ref 3.8–5.2)
SODIUM SERPL-SCNC: 140 MMOL/L (ref 136–145)
TROPONIN T SERPL HS-MCNC: 14 NG/L
TROPONIN T SERPL HS-MCNC: 15 NG/L
TSH SERPL DL<=0.005 MIU/L-ACNC: 1.78 UIU/ML (ref 0.3–4.2)
WBC # BLD AUTO: 8.6 10E3/UL (ref 4–11)

## 2023-01-22 PROCEDURE — 80048 BASIC METABOLIC PNL TOTAL CA: CPT | Performed by: PHYSICIAN ASSISTANT

## 2023-01-22 PROCEDURE — 85025 COMPLETE CBC W/AUTO DIFF WBC: CPT | Performed by: PHYSICIAN ASSISTANT

## 2023-01-22 PROCEDURE — 83735 ASSAY OF MAGNESIUM: CPT | Performed by: PHYSICIAN ASSISTANT

## 2023-01-22 PROCEDURE — 99285 EMERGENCY DEPT VISIT HI MDM: CPT | Mod: 25

## 2023-01-22 PROCEDURE — 84443 ASSAY THYROID STIM HORMONE: CPT | Performed by: PHYSICIAN ASSISTANT

## 2023-01-22 PROCEDURE — 84484 ASSAY OF TROPONIN QUANT: CPT | Performed by: PHYSICIAN ASSISTANT

## 2023-01-22 PROCEDURE — 71046 X-RAY EXAM CHEST 2 VIEWS: CPT

## 2023-01-22 PROCEDURE — 36415 COLL VENOUS BLD VENIPUNCTURE: CPT | Performed by: PHYSICIAN ASSISTANT

## 2023-01-22 PROCEDURE — 93005 ELECTROCARDIOGRAM TRACING: CPT

## 2023-01-22 ASSESSMENT — ACTIVITIES OF DAILY LIVING (ADL)
ADLS_ACUITY_SCORE: 35
ADLS_ACUITY_SCORE: 35

## 2023-01-22 NOTE — ED PROVIDER NOTES
History     Chief Complaint:  Palpitations       HPI   Aida Alberts is a 77 year old female with history of breast cancer in remission, SARAH who presents after episode of tachycardia.  The patient notes that she feels in her usual state of health and denies any symptoms at this time however when she woke up this morning her apple watch indicated that she had multiple runs of tachycardia during the night while sleeping 5 in total which was concerning for atrial fibrillation per her watch is interpretation.  The patient has not noted any palpitations chest pain shortness of breath or persistent dizziness.  No fever cough vomiting diarrhea or leg swelling.  She does not have any prior history of arrhythmia.  She is not on blood thinners.  Feels quite well at this time.  She does note that she did have a few alcoholic drinks last night but is not a daily drinker.  She does not smoke.  No IV drug use no rashes.      Independent Historian: Sister who is NP    Review of External Notes: I reviewed pulmonology note from 1/13 which notes history of SARAH.    ROS:  Review of Systems   All other systems reviewed and are negative.      Allergies:  Cefuroxime  Amoxicillin-Pot Clavulanate     Medications:    Valium  Flonase  Ativan  Crestor     Past Medical History:    BRCA2 positive  Breast cancer  Colonic adenoma  Hyperlipidemia   Acid reflux  Extreme obesity  Arthritis  Dyspepsia   Fibrocystic breast  Radiation therapy   Insomnia    Past Surgical History:    Breast biopsy x2  Colonoscopy  Breast lumpectomy, right  Cataract removal, bilateral   Hysterectomy  Oophorectomy     Family History:    Heart disease x2  Heart failure  Hereditary breast and ovarian cancer syndrome x4  Stomach cancer  Uterine cancer  Ovarian cancer  Colon cancer     Social History:  The patient quit smoking about 14 years ago.  The patient presents to the ED with a friend.  PCP: Anna Jackson     Physical Exam     Patient Vitals for the past 24  "hrs:   BP Temp Temp src Pulse Resp SpO2 Height Weight   01/22/23 1700 (!) 149/89 -- -- 85 22 94 % -- --   01/22/23 1600 133/81 -- -- 79 -- 99 % -- --   01/22/23 1500 136/81 -- -- 77 -- 97 % -- --   01/22/23 1254 (!) 141/78 98.1  F (36.7  C) Oral 93 20 98 % 1.651 m (5' 5\") 111.2 kg (245 lb 2.4 oz)      Physical Exam  General: Awake, alert, non-toxic.  Head:  Scalp is NC/AT  Eyes:  Conjunctiva normal, PERRL  ENT:  The external nose and ears are normal.   Neck:  Normal range of motion without rigidity.  CV:  Regular rate and rhythm    No pathologic murmur, rubs, or gallops.  Resp:  Breath sounds are clear bilaterally    Non-labored, no retractions or accessory muscle use  Abdomen: Abdomen is soft, no distension, no tenderness, no masses.  MS:  No lower extremity edema or asymmetric calf swelling.  Skin:  Warm and dry, No rash or lesions noted.  Neuro: Alert and oriented.  GCS 15 No gross motor deficits.  No facial asymmetry.  Psych:  Awake. Alert. Normal affect. Appropriate interactions.    Emergency Department Course   ECG:  ECG results from 01/22/23   EKG 12-lead, tracing only     Value    Systolic Blood Pressure     Diastolic Blood Pressure     Ventricular Rate 84    Atrial Rate 84    NM Interval 178    QRS Duration 94        QTc 418    P Axis 60    R AXIS 11    T Axis 14    Interpretation ECG      Sinus rhythm  Inferior infarct , age undetermined  Abnormal ECG  When compared with ECG of 05-FEB-2016 08:33,  Premature ventricular complexes are no longer Present       Imaging:  Chest XR,  PA & LAT   Final Result   IMPRESSION: Negative chest.         Echocardiogram Complete    (Results Pending)   Leadless EKG Monitor 3 to 14 Days    (Results Pending)      Report per radiology    Laboratory:  Labs Ordered and Resulted from Time of ED Arrival to Time of ED Departure   TROPONIN T, HIGH SENSITIVITY - Abnormal       Result Value    Troponin T, High Sensitivity 15 (*)    CBC WITH PLATELETS AND DIFFERENTIAL - Abnormal "    WBC Count 8.6      RBC Count 5.20      Hemoglobin 16.1 (*)     Hematocrit 49.3 (*)     MCV 95      MCH 31.0      MCHC 32.7      RDW 13.7      Platelet Count 242      % Neutrophils 73      % Lymphocytes 17      % Monocytes 9      % Eosinophils 1      % Basophils 0      % Immature Granulocytes 0      NRBCs per 100 WBC 0      Absolute Neutrophils 6.3      Absolute Lymphocytes 1.5      Absolute Monocytes 0.8      Absolute Eosinophils 0.1      Absolute Basophils 0.0      Absolute Immature Granulocytes 0.0      Absolute NRBCs 0.0     BASIC METABOLIC PANEL - Normal    Sodium 140      Potassium 4.2      Chloride 101      Carbon Dioxide (CO2) 26      Anion Gap 13      Urea Nitrogen 19.6      Creatinine 0.54      Calcium 9.5      Glucose 86      GFR Estimate >90     TSH WITH FREE T4 REFLEX - Normal    TSH 1.78     MAGNESIUM - Normal    Magnesium 2.1     TROPONIN T, HIGH SENSITIVITY - Normal    Troponin T, High Sensitivity 14        Emergency Department Course & Assessments:  1315: I performed an exam of the patient and obtained history, as documented above.  1650: I rechecked the patient and explained findings. I believe that they are safe for discharge at this time.    Independent Interpretation (X-rays, CTs, rhythm strip):  I reviewed the patient's chest x-ray agree with no evidence of infiltrate fluid overload or cardiomegaly.      Social Determinants of Health affecting care:  EtOH use, advanced age.    Disposition:  The patient was discharged to home.     Impression & Plan      Medical Decision Makin-year-old female who presents asymptomatic but after her apple watch indicated some runs of tachycardia concerning for A. fib during sleep last night.  At present she denies any complaints and is in sinus rhythm with possible old inferior infarct seen but no change from prior.   Her work-up is otherwise unremarkable.  Her initial troponin was very slightly elevated but is flat suspect this was demand induced from  last night no chest pain shortness of breath very low suspicion for ACS PE etc.  No clinical evidence of congestive heart failure.  No evidence of infection hyperthyroidism anemia or other secondary causes.     She did not have any runs of arrhythmia or tachycardia while here in the ED and remains asymptomatic.  We did discuss that her episodes noted on the apple watch are suspicious for atrial fibrillation but not confirmed.  She prefers to hold off on initiating anticoagulation pending further studies and confirmation which I think is reasonable.  14-day outpatient cardiac monitor and outpatient echocardiogram as well as cardiology referrals placed.  Discussed to avoid alcohol use return precautions if symptoms develop or other concerns.  Diagnosis:    ICD-10-CM    1. Tachycardia, paroxysmal (H)  I47.9 Echocardiogram Complete     Leadless EKG Monitor 3 to 14 Days     Follow-Up with Cardiology    Resolved         Scribe Disclosure:  ANDREAS, Aster Cisneros, am serving as a scribe at 1:54 PM on 1/22/2023 to document services personally performed by Orlin Marie PA-C based on my observations and the provider's statements to me.    1/22/2023   Orlin Marie PA-C Etten, Clark Ellsworth, PA-C  01/22/23 [id number]

## 2023-01-22 NOTE — ED TRIAGE NOTES
Pt arrives to the ED due to concern for afib. Pt states no h/o of atrial fibrillation but does have family members that had it. States that she got notified by her apple watch that she may have gone into a different rhythm around 0150 am. Apple watch showed HR up to 160. Pt states she did feel dizzy this AM. Pt denies any palpitations currently. No SOB or nausea.

## 2023-01-23 LAB
ATRIAL RATE - MUSE: 84 BPM
DIASTOLIC BLOOD PRESSURE - MUSE: NORMAL MMHG
INTERPRETATION ECG - MUSE: NORMAL
P AXIS - MUSE: 60 DEGREES
PR INTERVAL - MUSE: 178 MS
QRS DURATION - MUSE: 94 MS
QT - MUSE: 354 MS
QTC - MUSE: 418 MS
R AXIS - MUSE: 11 DEGREES
SYSTOLIC BLOOD PRESSURE - MUSE: NORMAL MMHG
T AXIS - MUSE: 14 DEGREES
VENTRICULAR RATE- MUSE: 84 BPM

## 2023-02-14 ENCOUNTER — TRANSFERRED RECORDS (OUTPATIENT)
Dept: HEALTH INFORMATION MANAGEMENT | Facility: CLINIC | Age: 78
End: 2023-02-14
Payer: COMMERCIAL

## 2023-02-20 ENCOUNTER — HOSPITAL ENCOUNTER (OUTPATIENT)
Dept: CARDIOLOGY | Facility: CLINIC | Age: 78
Discharge: HOME | End: 2023-02-20
Attending: PHYSICIAN ASSISTANT | Admitting: PHYSICIAN ASSISTANT
Payer: COMMERCIAL

## 2023-02-20 ENCOUNTER — ONCOLOGY VISIT (OUTPATIENT)
Dept: ONCOLOGY | Facility: HOSPITAL | Age: 78
End: 2023-02-20
Attending: INTERNAL MEDICINE
Payer: COMMERCIAL

## 2023-02-20 ENCOUNTER — PATIENT OUTREACH (OUTPATIENT)
Dept: ONCOLOGY | Facility: HOSPITAL | Age: 78
End: 2023-02-20

## 2023-02-20 VITALS
TEMPERATURE: 97.5 F | WEIGHT: 242 LBS | RESPIRATION RATE: 18 BRPM | HEIGHT: 65 IN | DIASTOLIC BLOOD PRESSURE: 73 MMHG | BODY MASS INDEX: 40.32 KG/M2 | HEART RATE: 77 BPM | SYSTOLIC BLOOD PRESSURE: 125 MMHG | OXYGEN SATURATION: 97 %

## 2023-02-20 DIAGNOSIS — Z15.01 BRCA2 POSITIVE: Primary | ICD-10-CM

## 2023-02-20 DIAGNOSIS — Z12.39 BREAST CANCER SCREENING, HIGH RISK PATIENT: ICD-10-CM

## 2023-02-20 DIAGNOSIS — I47.9 TACHYCARDIA, PAROXYSMAL (H): ICD-10-CM

## 2023-02-20 DIAGNOSIS — Z15.09 BRCA2 POSITIVE: Primary | ICD-10-CM

## 2023-02-20 DIAGNOSIS — Z17.1 MALIGNANT NEOPLASM OF UPPER-OUTER QUADRANT OF RIGHT BREAST IN FEMALE, ESTROGEN RECEPTOR NEGATIVE (H): ICD-10-CM

## 2023-02-20 DIAGNOSIS — C50.411 MALIGNANT NEOPLASM OF UPPER-OUTER QUADRANT OF RIGHT BREAST IN FEMALE, ESTROGEN RECEPTOR NEGATIVE (H): ICD-10-CM

## 2023-02-20 LAB — LVEF ECHO: NORMAL

## 2023-02-20 PROCEDURE — 93306 TTE W/DOPPLER COMPLETE: CPT | Mod: 26 | Performed by: INTERNAL MEDICINE

## 2023-02-20 PROCEDURE — 93306 TTE W/DOPPLER COMPLETE: CPT

## 2023-02-20 PROCEDURE — G0463 HOSPITAL OUTPT CLINIC VISIT: HCPCS | Performed by: INTERNAL MEDICINE

## 2023-02-20 PROCEDURE — 99214 OFFICE O/P EST MOD 30 MIN: CPT | Performed by: INTERNAL MEDICINE

## 2023-02-20 RX ORDER — MELOXICAM 7.5 MG/1
1 TABLET ORAL 2 TIMES DAILY PRN
COMMUNITY
Start: 2022-12-31 | End: 2024-04-08

## 2023-02-20 RX ORDER — CYCLOBENZAPRINE HCL 10 MG
1 TABLET ORAL 3 TIMES DAILY PRN
COMMUNITY
Start: 2023-01-23 | End: 2024-04-08

## 2023-02-20 ASSESSMENT — PAIN SCALES - GENERAL: PAINLEVEL: NO PAIN (0)

## 2023-02-20 NOTE — PROGRESS NOTES
"Oncology Rooming Note    February 20, 2023 11:24 AM   Aida Alberts is a 77 year old female who presents for:    Chief Complaint   Patient presents with     Oncology Clinic Visit     1 year MD follow up related to Malignant neoplasm of upper-outer quadrant of right breast in female, estrogen receptor negative     Initial Vitals: /73 (BP Location: Left arm, Patient Position: Sitting, Cuff Size: Adult Large)   Pulse 77   Temp 97.5  F (36.4  C) (Tympanic)   Resp 18   Ht 1.651 m (5' 5\")   Wt 109.8 kg (242 lb)   LMP  (LMP Unknown)   SpO2 97%   BMI 40.27 kg/m   Estimated body mass index is 40.27 kg/m  as calculated from the following:    Height as of this encounter: 1.651 m (5' 5\").    Weight as of this encounter: 109.8 kg (242 lb). Body surface area is 2.24 meters squared.  No Pain (0) Comment: Data Unavailable   No LMP recorded (lmp unknown). Patient is postmenopausal.  Allergies reviewed: Yes  Medications reviewed: Yes    Medications: Medication refills not needed today.  Pharmacy name entered into [employer]:    [facility] [facility] [place] - [address] [facility] #[id number] - [place], [place] - [address] [facility] [id number] [place], [place] - [address] [address]    Clinical concerns: 1 year MD follow up related to Malignant neoplasm of upper-outer quadrant of right breast in female, estrogen receptor negative      KOKO MONREAL CMA            "

## 2023-02-20 NOTE — PROGRESS NOTES
[facility] Hematology and Oncology Progress Note    Patient: Aida Alberts  MRN: [id number]  Date of Service: Feb 20, 2023       Reason for visit         Problem List Items Addressed This Visit        Other    BRCA2 positive - Primary    Malignant neoplasm of upper-outer quadrant of right breast in female, estrogen receptor negative (H)   Other Visit Diagnoses     Breast cancer screening, high risk patient                Assessment      1. A very pleasant 77 year old  woman with history of breast cancer diagnosed in  2003 status post lumpectomy, adjuvant chemotherapy and radiation therapy. This was triple negative so no hormone therapy was needed.  April 2018 MRI showed questionable shadow in the left breast.  Biopsy showed no evidence of any disease only some fibro-cystic changes and minimal proliferative changes.  Current mammogram is negative.  2.  High risk BRCA2 positive. Her one sister has been diagnosed with stage IV ovarian cancer. S/P b/l ADONAY and BSO.  3. History of smoking in the past.  4. Patient herself has no biological children.      Plan      1.  At this time we will continue with annual breast MRI.  2.  I am going to check with Estrella Her to see what is the best surveillance for her at age 77.  3.  Continue with other preventive care strategies.  4.  Follow-up with us in a years time.  5.  She also should get a breast examination done in 6 months timeframe.    Medical decision making      Moderately complex.      Risk      Moderate risk of morbidity mortality.  Significant risk of side effects from intervention.      Cancer Staging   No matching staging information was found for the patient.        History of present illness      Ms. Aida Alberts is a very pleasant 77 year old  female with a history of breast cancer diagnosed in 2003 located on the right side measuring 1.6 cm in size presenting as a mammogram otherwise completely asymptomatic for which she had lumpectomy and  sentinel lymph node biopsy. [place] lymph node biopsy did show some positive cells on immunohistochemistry in her lymph node. Subsequent to that she underwent adjuvant chemotherapy with a dose dense [place] and cyclophosphamide for 4 cycles. Then she went on to have radiation therapy. Then at [facility] they discovered that the lymph node might have been positive so she was given adjuvant Taxol therapy as well. This was triple negative type of breast cancer so no adjuvant hormonal therapy was given. She has since been followed between [facility] and her regular doctor. No evidence of any recurrence. In Summer of 2015 she was found to be positive for BRCA2 mutation. This came to light after her sister was diagnosed with ovarian cancer. She was tested and was found to be positive for BRCA2. Subsequent to that patient and her other siblings were also detected to be positive. Interestingly patient has 3 other unaffected siblings who are all positive as well.  Patient's father  of heart problems. One of his brother  of lung cancer. One of her nieces, sister's daughter was diagnosed with breast cancer at age 40. However she tested negative for BRCA2 mutation.    She was counseled to have her ovaries removed. In 2016 she underwent ADONAY with BSO. No malignancy was found. She recovering well from her surgery.     She had MRI of the breast done at [facility] in 2018.  That showed that behind the nipple on her left breast there were some shadows which was suspicious for malignancy.  She was recommended to have biopsy.  That biopsy was negative as well.    She has been following up with high risk genetics clinic.  She got an MRI of her breast done earlier in January 202 3.  Reportedly it was normal.  I do not have the report with me.    She is otherwise doing fine.  No significant changes in her family medical history.      Review of system      Details noted in the history of present illness.  A 14  "point review of systems is otherwise negative.        Past medical history      Past Medical History:   Diagnosis Date     BRCA2 positive 01/22/2015    deletion of exons 5-7, identified using single site testing through [employer]     Breast cancer (H) 01/01/2002     Colon polyp     patient reports about 6 adenomas     Colonic adenoma     5-6 advanced adenomas in 2013 colonoscopy     Fibrocystic breast      Hx antineoplastic chemotherapy      Hx of radiation therapy      Hyperlipidemia      Malignant neoplasm of upper-outer quadrant of right breast in female, estrogen receptor negative (H) 2002    infiltrating ductal carcinoma w/DCIS, Stage IIA (T1c, N1, cM0, Free text: ER-ve,[place]-ve,Svy8gvq -ve)       Past Surgical History:   Procedure Laterality Date     BIOPSY BREAST  01/01/2018    at [facility]     COLONOSCOPY       EXCISE BREAST CYST/FIBROADENOMA/TUMOR/DUCT LESION/NIPPLE LESION/AREOLAR LESION      Description: Breast Surgery Lumpectomy;  Recorded: 01/07/2011;  Comments: Breast cancer, 2005     HYSTERECTOMY  02/23/2016     LUMPECTOMY BREAST       OOPHORECTOMY         Physical exam        /73 (BP Location: Left arm, Patient Position: Sitting, Cuff Size: Adult Large)   Pulse 77   Temp 97.5  F (36.4  C) (Tympanic)   Resp 18   Ht 1.651 m (5' 5\")   Wt 109.8 kg (242 lb)   LMP  (LMP Unknown)   SpO2 97%   BMI 40.27 kg/m          GENERAL: Alert and oriented to time place and person.  In no distress.    HEAD: Atraumatic and normocephalic.    EYES: IRMA, EOMI. No pallor. No icterus.    Oral cavity: no mucosal lesion or tonsillar enlargement.    NECK: supple. JVP normal.No thyroid enlargement.    LYMPH NODES: No palpable, cervical, axillary or inguinal lymphadenopathy.    CHEST: clear to auscultation bilaterally. Symmetrical breath movements bilaterally.    CVS: S1 and S2 are Regular rate and rhythm. No murmur heard. No peripheral edema.    ABDOMEN: Soft. Not tender. Not distended. No palpable " hepatomegaly or splenomegaly. No other mass palpable. Bowel sounds heard.    EXTREMITIES: Warm.    SKIN: no rash, or bruising or purpura.      Lab results      No results found for this or any previous visit (from the past 168 hour(s)).    Imaging results        Chest XR,  PA & LAT    Addendum Date: 1/24/2023    Aida Alberts Accession: #[id number] Additional history includes palpitations. Jagjit Faulkner MD   Date of Addendum: 1/23/2023. JAGJIT FAULKNER MD   SYSTEM ID:  [id number]    Result Date: 1/24/2023  EXAM: XR CHEST 2 VIEWS LOCATION: [facility] DATE/TIME: 01/22/2023, 2:32 PM INDICATION: Possible new onset Afib. COMPARISON: 11/20/2015.     IMPRESSION: Negative chest.     Personally reviewed this chest x-ray images.    Signed by: Laz Hernández MD,       This note has been dictated using voice recognition software. Any grammatical or context distortions are unintentional and inherent to the software

## 2023-02-20 NOTE — LETTER
"    2/20/2023         RE: Aida Alberts  [address]  [facility] [address]        Dear Colleague,    Thank you for referring your patient, Aida Alberts, to the [facility] [place]. Please see a copy of my visit note below.    Oncology Rooming Note    February 20, 2023 11:24 AM   Aida Alberts is a 77 year old female who presents for:    Chief Complaint   Patient presents with     Oncology Clinic Visit     1 year MD follow up related to Malignant neoplasm of upper-outer quadrant of right breast in female, estrogen receptor negative     Initial Vitals: /73 (BP Location: Left arm, Patient Position: Sitting, Cuff Size: Adult Large)   Pulse 77   Temp 97.5  F (36.4  C) (Tympanic)   Resp 18   Ht 1.651 m (5' 5\")   Wt 109.8 kg (242 lb)   LMP  (LMP Unknown)   SpO2 97%   BMI 40.27 kg/m   Estimated body mass index is 40.27 kg/m  as calculated from the following:    Height as of this encounter: 1.651 m (5' 5\").    Weight as of this encounter: 109.8 kg (242 lb). Body surface area is 2.24 meters squared.  No Pain (0) Comment: Data Unavailable   No LMP recorded (lmp unknown). Patient is postmenopausal.  Allergies reviewed: Yes  Medications reviewed: Yes    Medications: Medication refills not needed today.  Pharmacy name entered into [employer]:    [id number]  [place], [place] - [address] [facility]Y #[id number] - [place], [place] - [address] [facility] [id number] [place], [place] [address] [address] [address]     Clinical concerns: 1 year MD follow up related to Malignant neoplasm of upper-outer quadrant of right breast in female, estrogen receptor negative      KOKO MONREAL CMA              [facility] Hematology and Oncology Progress Note    Patient: Aida Alberts  MRN: [id number]  Date of Service: Feb 20, 2023       Reason for visit         Problem List Items Addressed This Visit        Other    BRCA2 positive - " Primary    Malignant neoplasm of upper-outer quadrant of right breast in female, estrogen receptor negative (H)   Other Visit Diagnoses     Breast cancer screening, high risk patient                Assessment      1. A very pleasant 77 year old  woman with history of breast cancer diagnosed in  2003 status post lumpectomy, adjuvant chemotherapy and radiation therapy. This was triple negative so no hormone therapy was needed.  April 2018 MRI showed questionable shadow in the left breast.  Biopsy showed no evidence of any disease only some fibro-cystic changes and minimal proliferative changes.  Current mammogram is negative.  2.  High risk BRCA2 positive. Her one sister has been diagnosed with stage IV ovarian cancer. S/P b/l ADONAY and BSO.  3. History of smoking in the past.  4. Patient herself has no biological children.      Plan      1.  At this time we will continue with annual breast MRI.  2.  I am going to check with Estrella Her to see what is the best surveillance for her at age 77.  3.  Continue with other preventive care strategies.  4.  Follow-up with us in a years time.  5.  She also should get a breast examination done in 6 months timeframe.    Medical decision making      Moderately complex.      Risk      Moderate risk of morbidity mortality.  Significant risk of side effects from intervention.      Cancer Staging   No matching staging information was found for the patient.        History of present illness      Ms. Aida Alberts is a very pleasant 77 year old  female with a history of breast cancer diagnosed in 2003 located on the right side measuring 1.6 cm in size presenting as a mammogram otherwise completely asymptomatic for which she had lumpectomy and sentinel lymph node biopsy. [place] lymph node biopsy did show some positive cells on immunohistochemistry in her lymph node. Subsequent to that she underwent adjuvant chemotherapy with a dose dense [place] and cyclophosphamide for 4 cycles.  Then she went on to have radiation therapy. Then at [facility] they discovered that the lymph node might have been positive so she was given adjuvant Taxol therapy as well. This was triple negative type of breast cancer so no adjuvant hormonal therapy was given. She has since been followed between [facility] and her regular doctor. No evidence of any recurrence. In Summer of 2015 she was found to be positive for BRCA2 mutation. This came to light after her sister was diagnosed with ovarian cancer. She was tested and was found to be positive for BRCA2. Subsequent to that patient and her other siblings were also detected to be positive. Interestingly patient has 3 other unaffected siblings who are all positive as well.  Patient's father  of heart problems. One of his brother  of lung cancer. One of her nieces, sister's daughter was diagnosed with breast cancer at age 40. However she tested negative for BRCA2 mutation.    She was counseled to have her ovaries removed. In 2016 she underwent ADONAY with BSO. No malignancy was found. She recovering well from her surgery.     She had MRI of the breast done at [facility] in 2018.  That showed that behind the nipple on her left breast there were some shadows which was suspicious for malignancy.  She was recommended to have biopsy.  That biopsy was negative as well.    She has been following up with high risk genetics clinic.  She got an MRI of her breast done earlier in January 202 3.  Reportedly it was normal.  I do not have the report with me.    She is otherwise doing fine.  No significant changes in her family medical history.      Review of system      Details noted in the history of present illness.  A 14 point review of systems is otherwise negative.        Past medical history      Past Medical History:   Diagnosis Date     BRCA2 positive 2015    deletion of exons 5-7, identified using single site testing through [email or web address]      "Breast cancer (H) 01/01/2002     Colon polyp     patient reports about 6 adenomas     Colonic adenoma     5-6 advanced adenomas in 2013 colonoscopy     Fibrocystic breast      Hx antineoplastic chemotherapy      Hx of radiation therapy      Hyperlipidemia      Malignant neoplasm of upper-outer quadrant of right breast in female, estrogen receptor negative (H) 2002    infiltrating ductal carcinoma w/DCIS, Stage IIA (T1c, N1, cM0, Free text: ER-ve,[place]-ve,Ema0zfn -ve)       Past Surgical History:   Procedure Laterality Date     BIOPSY BREAST  01/01/2018    at [facility]     COLONOSCOPY       EXCISE BREAST CYST/FIBROADENOMA/TUMOR/DUCT LESION/NIPPLE LESION/AREOLAR LESION      Description: Breast Surgery Lumpectomy;  Recorded: 01/07/2011;  Comments: Breast cancer, 2005     HYSTERECTOMY  02/23/2016     LUMPECTOMY BREAST       OOPHORECTOMY         Physical exam        /73 (BP Location: Left arm, Patient Position: Sitting, Cuff Size: Adult Large)   Pulse 77   Temp 97.5  F (36.4  C) (Tympanic)   Resp 18   Ht 1.651 m (5' 5\")   Wt 109.8 kg (242 lb)   LMP  (LMP Unknown)   SpO2 97%   BMI 40.27 kg/m          GENERAL: Alert and oriented to time place and person.  In no distress.    HEAD: Atraumatic and normocephalic.    EYES: IRMA, EOMI. No pallor. No icterus.    Oral cavity: no mucosal lesion or tonsillar enlargement.    NECK: supple. JVP normal.No thyroid enlargement.    LYMPH NODES: No palpable, cervical, axillary or inguinal lymphadenopathy.    CHEST: clear to auscultation bilaterally. Symmetrical breath movements bilaterally.    CVS: S1 and S2 are Regular rate and rhythm. No murmur heard. No peripheral edema.    ABDOMEN: Soft. Not tender. Not distended. No palpable hepatomegaly or splenomegaly. No other mass palpable. Bowel sounds heard.    EXTREMITIES: Warm.    SKIN: no rash, or bruising or purpura.      Lab results      No results found for this or any previous visit (from the past 168 hour(s)).    Imaging " results        Chest XR,  PA & LAT    Addendum Date: 1/24/2023    Aida Alberts Accession: #[id number] Additional history includes palpitations. Jagjit Faulkner MD   Date of Addendum: 1/23/2023. JAGJIT FAULKNER MD   SYSTEM ID:  [id number]    Result Date: 1/24/2023  EXAM: XR CHEST 2 VIEWS LOCATION: [facility] DATE/TIME: 01/22/2023, 2:32 PM INDICATION: Possible new onset Afib. COMPARISON: 11/20/2015.     IMPRESSION: Negative chest.     Personally reviewed this chest x-ray images.    Signed by: Laz Hernández MD,       This note has been dictated using voice recognition software. Any grammatical or context distortions are unintentional and inherent to the software       Again, thank you for allowing me to participate in the care of your patient.        Sincerely,        Laz Hernández MD, MD

## 2023-03-02 ENCOUNTER — HOSPITAL ENCOUNTER (OUTPATIENT)
Dept: CARDIOLOGY | Facility: CLINIC | Age: 78
Discharge: HOME | End: 2023-03-02
Attending: PHYSICIAN ASSISTANT | Admitting: PHYSICIAN ASSISTANT
Payer: COMMERCIAL

## 2023-03-02 DIAGNOSIS — I47.9 TACHYCARDIA, PAROXYSMAL (H): ICD-10-CM

## 2023-03-02 PROCEDURE — 93242 EXT ECG>48HR<7D RECORDING: CPT

## 2023-03-02 PROCEDURE — 93244 EXT ECG>48HR<7D REV&INTERPJ: CPT | Performed by: INTERNAL MEDICINE

## 2023-03-08 ENCOUNTER — TELEPHONE (OUTPATIENT)
Dept: CARDIOLOGY | Facility: CLINIC | Age: 78
End: 2023-03-08
Payer: COMMERCIAL

## 2023-03-08 NOTE — TELEPHONE ENCOUNTER
[facility]    Phone Message    May a detailed message be left on voicemail: yes     Reason for Call: Other:     Pt is requesting that the sleep apnea records at [facility] be review for next follow up appt.    Action Taken: Other: cardio    Travel Screening: Not Applicable     Thank you!  [facility]                                                                        
Sleep records available in care everywhere.  Ayla Forman RN on 3/8/2023 at 3:01 PM    
0 = understands/communicates without difficulty

## 2023-03-24 ENCOUNTER — OFFICE VISIT (OUTPATIENT)
Dept: CARDIOLOGY | Facility: CLINIC | Age: 78
End: 2023-03-24
Attending: PHYSICIAN ASSISTANT
Payer: COMMERCIAL

## 2023-03-24 VITALS
HEIGHT: 66 IN | SYSTOLIC BLOOD PRESSURE: 117 MMHG | BODY MASS INDEX: 40.02 KG/M2 | WEIGHT: 249 LBS | DIASTOLIC BLOOD PRESSURE: 74 MMHG | HEART RATE: 86 BPM

## 2023-03-24 DIAGNOSIS — M25.561 PAIN IN BOTH KNEES, UNSPECIFIED CHRONICITY: ICD-10-CM

## 2023-03-24 DIAGNOSIS — E66.01 MORBID OBESITY (H): ICD-10-CM

## 2023-03-24 DIAGNOSIS — I47.29 PAROXYSMAL VENTRICULAR TACHYCARDIA (H): ICD-10-CM

## 2023-03-24 DIAGNOSIS — I48.0 PAF (PAROXYSMAL ATRIAL FIBRILLATION) (H): Primary | ICD-10-CM

## 2023-03-24 DIAGNOSIS — I47.9 TACHYCARDIA, PAROXYSMAL (H): ICD-10-CM

## 2023-03-24 DIAGNOSIS — Z82.49 FAMILY HISTORY OF ISCHEMIC HEART DISEASE: ICD-10-CM

## 2023-03-24 DIAGNOSIS — M25.562 PAIN IN BOTH KNEES, UNSPECIFIED CHRONICITY: ICD-10-CM

## 2023-03-24 PROCEDURE — 99204 OFFICE O/P NEW MOD 45 MIN: CPT | Performed by: INTERNAL MEDICINE

## 2023-03-24 NOTE — PROGRESS NOTES
HPI and Plan:   See dictation    Today's clinic visit entailed:  Review of external notes as documented elsewhere in note  Review of the result(s) of each unique test - Sleep study, lipids, bmp, HgbA1c  Ordering of each unique test    Provider  Link to MDM Help Grid     The level of medical decision making during this visit was of moderate complexity.      No orders of the defined types were placed in this encounter.      No orders of the defined types were placed in this encounter.      There are no discontinued medications.      Encounter Diagnoses   Name Primary?     Tachycardia, paroxysmal (H)      PAF (paroxysmal atrial fibrillation) (H) Yes     Morbid obesity (H)      Family history of ischemic heart disease      Paroxysmal ventricular tachycardia (H)      Pain in both knees, unspecified chronicity        CURRENT MEDICATIONS:  Current Outpatient Medications   Medication Sig Dispense Refill     acetaminophen (TYLENOL) 325 MG tablet Take 325-650 mg by mouth as needed       Cholecalciferol (VITAMIN D3) 2000 UNITS CAPS Take 2,000 Units by mouth Occasionally       magnesium 250 MG tablet Take 1 tablet by mouth daily       rosuvastatin (CRESTOR) 10 MG tablet Take 20 mg by mouth daily       cyclobenzaprine (FLEXERIL) 10 MG tablet Take 1 tablet by mouth 3 times daily as needed (Patient not taking: Reported on 2/20/2023)       diazepam (VALIUM) 5 MG tablet Take 5-10 mg by mouth (Patient not taking: Reported on 2/20/2023)       fluticasone (FLONASE) 50 MCG/ACT spray Spray 2 sprays in nostril (Patient not taking: Reported on 2/20/2023)       LORazepam (ATIVAN) 1 MG tablet Take 2 tablets (2 mg) by mouth once as needed for anxiety (prior to breast MRI) Take to the appt. And ask nurse when you should take it. Do not operate a vehicle after taking this medication 2 tablet 0     LORazepam (ATIVAN) 1 MG tablet Take 2 tablets (2 mg) by mouth once as needed for anxiety (prior to breast MRI) Take with you to breast MRI .  Do not  operate a vehicle after taking this medication (Patient not taking: Reported on 2023) 1 tablet 1     LORazepam (ATIVAN) 2 MG tablet Take 30 minutes prior to departure for breast MRI.  Do not operate a vehicle after taking this medication (Patient not taking: Reported on 2023) 1 tablet 0     meloxicam (MOBIC) 7.5 MG tablet Take 1 tablet by mouth 2 times daily as needed (Patient not taking: Reported on 2023)         ALLERGIES     Allergies   Allergen Reactions     Cefuroxime Hives     Amoxicillin-Pot Clavulanate Nausea and Vomiting       PAST MEDICAL HISTORY:  Past Medical History:   Diagnosis Date     BRCA2 positive 2015    deletion of exons 5-7, identified using single site testing through [email or web address]     Breast cancer (H) 2002     Colon polyp     patient reports about 6 adenomas     Colonic adenoma     5-6 advanced adenomas in 2013 colonoscopy     Fibrocystic breast      Hx antineoplastic chemotherapy      Hx of radiation therapy      Hyperlipidemia      Malignant neoplasm of upper-outer quadrant of right breast in female, estrogen receptor negative (H)     infiltrating ductal carcinoma w/DCIS, Stage IIA (T1c, N1, cM0, Free text: ER-ve,NE-ve,[id number] -ve)       PAST SURGICAL HISTORY:  Past Surgical History:   Procedure Laterality Date     BIOPSY BREAST  2018    at [facility]     COLONOSCOPY       EXCISE BREAST CYST/FIBROADENOMA/TUMOR/DUCT LESION/NIPPLE LESION/AREOLAR LESION      Description: Breast Surgery Lumpectomy;  Recorded: 2011;  Comments: Breast cancer, 2005     HYSTERECTOMY  2016     LUMPECTOMY BREAST       OOPHORECTOMY         FAMILY HISTORY:  Family History   Problem Relation Age of Onset     Heart Failure Mother          age 96     Heart Disease Father          in his 60s     Stomach Cancer Father         3/4 stomach removed at [place]; possibly 30s or 40s     Ovarian Cancer Sister 77        endometrioid, grade 2     Hereditary Breast and Ovarian  Cancer Syndrome Sister         BRCA2 positive, del exons 5-7, [facility] lab     Melanoma Sister 29     Hereditary Breast and Ovarian Cancer Syndrome Sister      Hereditary Breast and Ovarian Cancer Syndrome Sister      Heart Disease Sister         HOCM     Uterine Cancer Sister 76         age 76     Hereditary Breast and Ovarian Cancer Syndrome Brother      Leukemia Maternal Grandmother         chronic typle     Lung Cancer Paternal Uncle 60     Breast Cancer Niece 40        3 occurrences     Hereditary Breast and Ovarian Cancer Syndrome Niece         no cancer       SOCIAL HISTORY:  Social History     Socioeconomic History     Marital status:      Spouse name: NA     Number of children: 2     Years of education: None     Highest education level: None   Occupational History     Occupation: Retired     Comment: [employer] [Other]   Tobacco Use     Smoking status: Former     Packs/day: 0.50     Years: 17.00     Pack years: 8.50     Types: Cigarettes     Start date: 1/3/1965     Quit date: 2008     Years since quittin.2     Smokeless tobacco: Never     Tobacco comments:     1 pack a week until    Substance and Sexual Activity     Alcohol use: Yes     Alcohol/week: 7.0 standard drinks     Drug use: No     Sexual activity: Never   Social History Narrative    She previously owned a marketing company. She has 2 adopted children and 2 grandchildren and was  in  after 44 years of marriage (he is bipolar).  She lives alone in an apartment.     Social Determinants of Health     Intimate Partner Violence: Not At Risk     Fear of Current or Ex-Partner: No     Emotionally Abused: No     Physically Abused: No     Sexually Abused: No       Review of Systems:  Skin:          Eyes:         ENT:         Respiratory:  Negative       Cardiovascular:    edema;Positive for;fatigue    Gastroenterology:        Genitourinary:         Musculoskeletal:         Neurologic:        "  Psychiatric:         Heme/Lymph/Imm:         Endocrine:           Physical Exam:  Vitals: /74   Pulse 86   Ht 1.676 m (5' 6\")   Wt 112.9 kg (249 lb)   LMP  (LMP Unknown)   BMI 40.19 kg/m      Constitutional:  cooperative;in no acute distress obese      Skin:  warm and dry to the touch          Head:  normocephalic        Eyes:  pupils equal and round        Lymph:      ENT:  no pallor or cyanosis        Neck:  no carotid bruit        Respiratory:  normal symmetry;clear to auscultation         Cardiac: regular rhythm;no murmurs, gallops or rubs detected                pulses full and equal                                        GI:  abdomen soft obese      Extremities and Muscular Skeletal:  no edema;no deformities, clubbing, cyanosis, erythema observed              Neurological:  no gross motor deficits;affect appropriate        Psych:  Alert and Oriented x 3        CC  Orlin Marie PA-C  [facility]  [address]  [place] [address]              "

## 2023-03-24 NOTE — PATIENT INSTRUCTIONS
"Recommend Aspirin 325mg daily WITH FOOD.  \"Enteric coated Aspirin\" is  best.  This is to reduce risk of stroke related to Atrial fibrillation.  "

## 2023-03-24 NOTE — LETTER
3/24/2023    Rebecca B Benson [facility] [address] [place] [address]    RE: Aida Alberts       Dear Colleague,     I had the [facility] of [facility] Aida [facility]ts in [facility].  HPI and Plan:   See dictation    Today's clinic visit entailed:  Review of external notes as documented elsewhere in note  Review of the result(s) of each unique test - Sleep study, lipids, bmp, HgbA1c  Ordering of each unique test    Provider  Link to MDM Help Grid     The level of medical decision making during this visit was of moderate complexity.      No orders of the defined types were placed in this encounter.      No orders of the defined types were placed in this encounter.      There are no discontinued medications.      Encounter Diagnoses   Name Primary?     Tachycardia, paroxysmal (H)      PAF (paroxysmal atrial fibrillation) (H) Yes     Morbid obesity (H)      Family history of ischemic heart disease      Paroxysmal ventricular tachycardia (H)      Pain in both knees, unspecified chronicity        CURRENT MEDICATIONS:  Current Outpatient Medications   Medication Sig Dispense Refill     acetaminophen (TYLENOL) 325 MG tablet Take 325-650 mg by mouth as needed       Cholecalciferol (VITAMIN D3) 2000 UNITS CAPS Take 2,000 Units by mouth Occasionally       magnesium 250 MG tablet Take 1 tablet by mouth daily       rosuvastatin (CRESTOR) 10 MG tablet Take 20 mg by mouth daily       cyclobenzaprine (FLEXERIL) 10 MG tablet Take 1 tablet by mouth 3 times daily as needed (Patient not taking: Reported on 2/20/2023)       diazepam (VALIUM) 5 MG tablet Take 5-10 mg by mouth (Patient not taking: Reported on 2/20/2023)       fluticasone (FLONASE) 50 MCG/ACT spray Spray 2 sprays in nostril (Patient not taking: Reported on 2/20/2023)       LORazepam (ATIVAN) 1 MG tablet Take 2 tablets (2 mg) by mouth once as needed for anxiety (prior to breast MRI) Take to the appt. And ask nurse when you should  take it. Do not operate a vehicle after taking this medication 2 tablet 0     LORazepam (ATIVAN) 1 MG tablet Take 2 tablets (2 mg) by mouth once as needed for anxiety (prior to breast MRI) Take with you to breast MRI .  Do not operate a vehicle after taking this medication (Patient not taking: Reported on 2023) 1 tablet 1     LORazepam (ATIVAN) 2 MG tablet Take 30 minutes prior to departure for breast MRI.  Do not operate a vehicle after taking this medication (Patient not taking: Reported on 2023) 1 tablet 0     meloxicam (MOBIC) 7.5 MG tablet Take 1 tablet by mouth 2 times daily as needed (Patient not taking: Reported on 2023)         ALLERGIES     Allergies   Allergen Reactions     Cefuroxime Hives     Amoxicillin-Pot Clavulanate Nausea and Vomiting       PAST MEDICAL HISTORY:  Past Medical History:   Diagnosis Date     BRCA2 positive 2015    deletion of exons 5-7, identified using single site testing through [facility]     Breast cancer (H) 2002     Colon polyp     patient reports about 6 adenomas     Colonic adenoma     5-6 advanced adenomas in 2013 colonoscopy     Fibrocystic breast      Hx antineoplastic chemotherapy      Hx of radiation therapy      Hyperlipidemia      Malignant neoplasm of upper-outer quadrant of right breast in female, estrogen receptor negative (H)     infiltrating ductal carcinoma w/DCIS, Stage IIA (T1c, N1, cM0, Free text: ER-ve,ND-ve,[id number] -ve)       PAST SURGICAL HISTORY:  Past Surgical History:   Procedure Laterality Date     BIOPSY BREAST  2018    at [facility]     COLONOSCOPY       EXCISE BREAST CYST/FIBROADENOMA/TUMOR/DUCT LESION/NIPPLE LESION/AREOLAR LESION      Description: Breast Surgery Lumpectomy;  Recorded: 2011;  Comments: Breast cancer, 2005     HYSTERECTOMY  2016     LUMPECTOMY BREAST       OOPHORECTOMY         FAMILY HISTORY:  Family History   Problem Relation Age of Onset     Heart Failure Mother          age  96     Heart Disease Father          in his 60s     Stomach Cancer Father         3/4 stomach removed at [place]; possibly 30s or 40s     Ovarian Cancer Sister 77        endometrioid, grade 2     Hereditary Breast and Ovarian Cancer Syndrome Sister         BRCA2 positive, del exons 5-7, [facility] lab     Melanoma Sister 29     Hereditary Breast and Ovarian Cancer Syndrome Sister      Hereditary Breast and Ovarian Cancer Syndrome Sister      Heart Disease Sister         HOCM     Uterine Cancer Sister 76         age 76     Hereditary Breast and Ovarian Cancer Syndrome Brother      Leukemia Maternal Grandmother         chronic typle     Lung Cancer Paternal Uncle 60     Breast Cancer Niece 40        3 occurrences     Hereditary Breast and Ovarian Cancer Syndrome Niece         no cancer       SOCIAL HISTORY:  Social History     Socioeconomic History     Marital status:      Spouse name: NA     Number of children: 2     Years of education: None     Highest education level: None   Occupational History     Occupation: Retired     Comment: [employer] [Other]   Tobacco Use     Smoking status: Former     Packs/day: 0.50     Years: 17.00     Pack years: 8.50     Types: Cigarettes     Start date: 1/3/1965     Quit date: 2008     Years since quittin.2     Smokeless tobacco: Never     Tobacco comments:     1 pack a week until    Substance and Sexual Activity     Alcohol use: Yes     Alcohol/week: 7.0 standard drinks     Drug use: No     Sexual activity: Never   Social History Narrative    She previously owned a marketing company. She has 2 adopted children and 2 grandchildren and was  in 2014 after 44 years of marriage (he is bipolar).  She lives alone in an apartment.     Social Determinants of Health     Intimate Partner Violence: Not At Risk     Fear of Current or Ex-Partner: No     Emotionally Abused: No     Physically Abused: No     Sexually Abused: No       Review of  "Systems:  Skin:          Eyes:         ENT:         Respiratory:  Negative       Cardiovascular:    edema;Positive for;fatigue    Gastroenterology:        Genitourinary:         Musculoskeletal:         Neurologic:         Psychiatric:         Heme/Lymph/Imm:         Endocrine:           Physical Exam:  Vitals: /74   Pulse 86   Ht 1.676 m (5' 6\")   Wt 112.9 kg (249 lb)   LMP  (LMP Unknown)   BMI 40.19 kg/m      Constitutional:  cooperative;in no acute distress obese      Skin:  warm and dry to the touch          Head:  normocephalic        Eyes:  pupils equal and round        Lymph:      ENT:  no pallor or cyanosis        Neck:  no carotid bruit        Respiratory:  normal symmetry;clear to auscultation         Cardiac: regular rhythm;no murmurs, gallops or rubs detected                pulses full and equal                                        GI:  abdomen soft obese      Extremities and Muscular Skeletal:  no edema;no deformities, clubbing, cyanosis, erythema observed              Neurological:  no gross motor deficits;affect appropriate        Psych:  Alert and Oriented x 3        CC  Orlin Marie PA-C  [facility]  [address]  [place] [address]                Service Date: 03/24/2023    CLINIC NEW PATIENT VISIT    REFERRING PROVIDER:  Anna Jackson    HISTORY OF PRESENT ILLNESS:  Ms. Alberts is a pleasant 77-year-old female who comes into clinic today following an emergency room visit for palpitations.  She had noted on her Apple watch episodes of atrial fibrillation associated with palpitations and had presented to the emergency room in January.  She did not have an arrhythmia noted during her ER visit.  She was recommended to undergo an echocardiogram, leadless heart monitor, sleep apnea test due to her morbid obesity and followup with Cardiology.  She comes into clinic today.  She had a leadless heart monitor which she wore for about 13 days.  It " did not show evidence of atrial fibrillation.  She did have an episode of nonsustained ventricular tachycardia, about 5 beats in duration, and frequent PVCs.  The echocardiogram done in February shows normal LV and RV function, no significant valve disease, no evidence of pulmonary hypertension.  The sleep apnea test she actually brought in with her today.  I did review this.  It shows mild sleep apnea.  She was not recommended to undergo any type of treatment.  Ms. Alberts has a family history of atrial fibrillation in her mother and sister.  She also has a family history of premature coronary artery disease.  Her father had heart attacks in his early 40s and  by age 60 of heart disease.  She does have underlying hyperlipidemia and she takes rosuvastatin, moderate intensity, for this.  There is no history of diabetes, high blood pressure or kidney disease.  She is not experiencing any chest discomfort; however, she does get short of breath with exertion and she is limited in activity by ongoing knee pain.  She has been having knee injections.  On her medication list, it says she is taking meloxicam but she denies.  She rarely takes over-the-counter pain relievers, usually Tylenol for pain.    PHYSICAL EXAMINATION:    VITAL SIGNS:  Her blood pressure today is 117/74, pulse is 86.  Weight is 249 with a body mass index of 40.  NECK:  Carotid upstrokes were brisk without bruit.  CARDIOVASCULAR:  Tones are regular without murmur, gallop or rub.  RESPIRATORY:  Lung fields are clear.  EXTREMITIES:  She has no peripheral edema.    SUMMARY:  Ms. Alberts is a pleasant 77-year-old female with probable paroxysmal atrial fibrillation, although this was not caught on the leadless heart monitor.  It was noted on her Apple watch.  She does have underlying family history of atrial fibrillation as well.  I would like her to transmit any rhythm strips from her Apple watch, if possible, if she has any recurrence.  I would  also recommend an adult-sized aspirin, given a CHADS-VASc score of 2, for her to take with food on a daily basis.  Again, she denies taking meloxicam.  I will caution her about nonsteroidal anti-inflammatories in addition to aspirin can increase bleeding risks.  I recommend enteric-coated aspirin and to take with food.  She had an appropriate workup including echocardiogram, heart monitor and sleep apnea test.  I would also add a stress test given evidence of nonsustained VT, family history and risk factor of hyperlipidemia as well as morbid obesity.  Because of her knee pain, she cannot walk on a treadmill safely, so we will order a chemical stress test for her.  Lastly, morbid obesity; she is a little bit tearful about this.  She has been trying to lose weight for years and has been on a Noom diet for over a year now without any acceptable weight loss.  I suggested talking to her primary care provider about the glucagon peptide agonist that may assist her in losing the weight.  If she can have a plan with motivation to being more active as the weight loss comes off and have an endpoint for utilizing this drug, say 1-2 years down the road with weight loss, I think she can adapt a healthier lifestyle including exercise to keep the weight off.  She will discuss this further with her primary.  I will have her follow up with us with the results of the stress test once complete.    Please feel free to contact me with any questions you have in regards to her care.    Praveena Avendano DO    cc:  Rebecca B Benson, PA-C [facility]  Count includes the [facility] [place] [address]    Praveena Avendano DO        D: 2023   T: 2023   MT: bev    Name:     KALYN WILLIAM  MRN:      [id number]        Account:      [id number]   :      1945           Service Date: 2023       Document: [id number]      Thank you for allowing me to participate in the care of your patient.      Sincerely,     Praveena  Alice Avendano, [facility]  cc:   LUIGI PerryC  [facility]  [address] [place] [id number]

## 2023-03-24 NOTE — PROGRESS NOTES
Service Date: 2023    CLINIC NEW PATIENT VISIT    REFERRING PROVIDER:  Anna Jackson    HISTORY OF PRESENT ILLNESS:  Ms. Alberts is a pleasant 77-year-old female who comes into clinic today following an emergency room visit for palpitations.  She had noted on her Apple watch episodes of atrial fibrillation associated with palpitations and had presented to the emergency room in January.  She did not have an arrhythmia noted during her ER visit.  She was recommended to undergo an echocardiogram, leadless heart monitor, sleep apnea test due to her morbid obesity and followup with Cardiology.  She comes into clinic today.  She had a leadless heart monitor which she wore for about 13 days.  It did not show evidence of atrial fibrillation.  She did have an episode of nonsustained ventricular tachycardia, about 5 beats in duration, and frequent PVCs.  The echocardiogram done in February shows normal LV and RV function, no significant valve disease, no evidence of pulmonary hypertension.  The sleep apnea test she actually brought in with her today.  I did review this.  It shows mild sleep apnea.  She was not recommended to undergo any type of treatment.  Ms. Alberts has a family history of atrial fibrillation in her mother and sister.  She also has a family history of premature coronary artery disease.  Her father had heart attacks in his early 40s and  by age 60 of heart disease.  She does have underlying hyperlipidemia and she takes rosuvastatin, moderate intensity, for this.  There is no history of diabetes, high blood pressure or kidney disease.  She is not experiencing any chest discomfort; however, she does get short of breath with exertion and she is limited in activity by ongoing knee pain.  She has been having knee injections.  On her medication list, it says she is taking meloxicam but she denies.  She rarely takes over-the-counter pain relievers, usually Tylenol for pain.    PHYSICAL  EXAMINATION:    VITAL SIGNS:  Her blood pressure today is 117/74, pulse is 86.  Weight is 249 with a body mass index of 40.  NECK:  Carotid upstrokes were brisk without bruit.  CARDIOVASCULAR:  Tones are regular without murmur, gallop or rub.  RESPIRATORY:  Lung fields are clear.  EXTREMITIES:  She has no peripheral edema.    SUMMARY:  Ms. Alberts is a pleasant 77-year-old female with probable paroxysmal atrial fibrillation, although this was not caught on the leadless heart monitor.  It was noted on her Apple watch.  She does have underlying family history of atrial fibrillation as well.  I would like her to transmit any rhythm strips from her Apple watch, if possible, if she has any recurrence.  I would also recommend an adult-sized aspirin, given a CHADS-VASc score of 2, for her to take with food on a daily basis.  Again, she denies taking meloxicam.  I will caution her about nonsteroidal anti-inflammatories in addition to aspirin can increase bleeding risks.  I recommend enteric-coated aspirin and to take with food.  She had an appropriate workup including echocardiogram, heart monitor and sleep apnea test.  I would also add a stress test given evidence of nonsustained VT, family history and risk factor of hyperlipidemia as well as morbid obesity.  Because of her knee pain, she cannot walk on a treadmill safely, so we will order a chemical stress test for her.  Lastly, morbid obesity; she is a little bit tearful about this.  She has been trying to lose weight for years and has been on a Noom diet for over a year now without any acceptable weight loss.  I suggested talking to her primary care provider about the glucagon peptide agonist that may assist her in losing the weight.  If she can have a plan with motivation to being more active as the weight loss comes off and have an endpoint for utilizing this drug, say 1-2 years down the road with weight loss, I think she can adapt a healthier lifestyle including  exercise to keep the weight off.  She will discuss this further with her primary.  I will have her follow up with us with the results of the stress test once complete.    Please feel free to contact me with any questions you have in regards to her care.    Praveena Avendano DO    cc:  Rebecca B Benson, PA-C [facility]  [address] [place] [address]    Praveena Avendano DO        D: 2023   T: 2023   MT: bev    Name:     KALYN WILLIAM  MRN:      [id number]        Account:      [id number]   :      1945           Service Date: 2023       Document: [id number]

## 2023-04-01 ENCOUNTER — HEALTH MAINTENANCE LETTER (OUTPATIENT)
Age: 78
End: 2023-04-01

## 2023-04-06 ENCOUNTER — HOSPITAL ENCOUNTER (OUTPATIENT)
Dept: CARDIOLOGY | Facility: CLINIC | Age: 78
Discharge: HOME | End: 2023-04-06
Attending: INTERNAL MEDICINE
Payer: COMMERCIAL

## 2023-04-06 ENCOUNTER — HOSPITAL ENCOUNTER (OUTPATIENT)
Dept: CARDIOLOGY | Facility: CLINIC | Age: 78
Setting detail: NUCLEAR MEDICINE
Discharge: HOME | End: 2023-04-06
Attending: INTERNAL MEDICINE
Payer: COMMERCIAL

## 2023-04-06 VITALS
HEIGHT: 66 IN | DIASTOLIC BLOOD PRESSURE: 82 MMHG | HEART RATE: 74 BPM | WEIGHT: 250 LBS | BODY MASS INDEX: 40.18 KG/M2 | OXYGEN SATURATION: 97 % | SYSTOLIC BLOOD PRESSURE: 122 MMHG

## 2023-04-06 DIAGNOSIS — M25.561 PAIN IN BOTH KNEES, UNSPECIFIED CHRONICITY: ICD-10-CM

## 2023-04-06 DIAGNOSIS — Z82.49 FAMILY HISTORY OF ISCHEMIC HEART DISEASE: ICD-10-CM

## 2023-04-06 DIAGNOSIS — I48.0 PAF (PAROXYSMAL ATRIAL FIBRILLATION) (H): ICD-10-CM

## 2023-04-06 DIAGNOSIS — M25.562 PAIN IN BOTH KNEES, UNSPECIFIED CHRONICITY: ICD-10-CM

## 2023-04-06 DIAGNOSIS — E66.01 MORBID OBESITY (H): ICD-10-CM

## 2023-04-06 DIAGNOSIS — I47.29 PAROXYSMAL VENTRICULAR TACHYCARDIA (H): ICD-10-CM

## 2023-04-06 DIAGNOSIS — I47.9 TACHYCARDIA, PAROXYSMAL (H): ICD-10-CM

## 2023-04-06 LAB
CV STRESS MAX HR HE: 78
NUC STRESS EJECTION FRACTION: 55 %
RATE PRESSURE PRODUCT: NORMAL
STRESS ECHO BASELINE DIASTOLIC HE: 82
STRESS ECHO BASELINE HR: 73 BPM
STRESS ECHO BASELINE SYSTOLIC BP: 122
STRESS ECHO CALCULATED PERCENT HR: 55 %
STRESS ECHO LAST STRESS DIASTOLIC BP: 72
STRESS ECHO LAST STRESS SYSTOLIC BP: 138
STRESS ECHO TARGET HR: 143

## 2023-04-06 PROCEDURE — 343N000001 HC RX 343: Performed by: INTERNAL MEDICINE

## 2023-04-06 PROCEDURE — 78452 HT MUSCLE IMAGE SPECT MULT: CPT

## 2023-04-06 PROCEDURE — 93016 CV STRESS TEST SUPVJ ONLY: CPT | Performed by: INTERNAL MEDICINE

## 2023-04-06 PROCEDURE — A9502 TC99M TETROFOSMIN: HCPCS | Performed by: INTERNAL MEDICINE

## 2023-04-06 PROCEDURE — 250N000011 HC RX IP 250 OP 636: Performed by: INTERNAL MEDICINE

## 2023-04-06 PROCEDURE — 78452 HT MUSCLE IMAGE SPECT MULT: CPT | Mod: 26 | Performed by: INTERNAL MEDICINE

## 2023-04-06 PROCEDURE — 93017 CV STRESS TEST TRACING ONLY: CPT

## 2023-04-06 PROCEDURE — 93018 CV STRESS TEST I&R ONLY: CPT | Performed by: INTERNAL MEDICINE

## 2023-04-06 RX ORDER — ALBUTEROL SULFATE 90 UG/1
2 AEROSOL, METERED RESPIRATORY (INHALATION) EVERY 5 MIN PRN
Status: DISCONTINUED | OUTPATIENT
Start: 2023-04-06 | End: 2023-04-07 | Stop reason: HOSPADM

## 2023-04-06 RX ORDER — REGADENOSON 0.08 MG/ML
0.4 INJECTION, SOLUTION INTRAVENOUS ONCE
Status: COMPLETED | OUTPATIENT
Start: 2023-04-06 | End: 2023-04-06

## 2023-04-06 RX ORDER — AMINOPHYLLINE 25 MG/ML
50-100 INJECTION, SOLUTION INTRAVENOUS
Status: DISCONTINUED | OUTPATIENT
Start: 2023-04-06 | End: 2023-04-07 | Stop reason: HOSPADM

## 2023-04-06 RX ORDER — ACYCLOVIR 200 MG/1
0-1 CAPSULE ORAL
Status: DISCONTINUED | OUTPATIENT
Start: 2023-04-06 | End: 2023-04-07 | Stop reason: HOSPADM

## 2023-04-06 RX ORDER — CAFFEINE CITRATE 20 MG/ML
60 SOLUTION INTRAVENOUS
Status: DISCONTINUED | OUTPATIENT
Start: 2023-04-06 | End: 2023-04-07 | Stop reason: HOSPADM

## 2023-04-06 RX ADMIN — TETROFOSMIN 32.2 MILLICURIE: 1.38 INJECTION, POWDER, LYOPHILIZED, FOR SOLUTION INTRAVENOUS at 11:22

## 2023-04-06 RX ADMIN — TETROFOSMIN 9.8 MILLICURIE: 1.38 INJECTION, POWDER, LYOPHILIZED, FOR SOLUTION INTRAVENOUS at 09:39

## 2023-04-06 RX ADMIN — REGADENOSON 0.4 MG: 0.08 INJECTION, SOLUTION INTRAVENOUS at 11:19

## 2023-06-08 ENCOUNTER — TELEPHONE (OUTPATIENT)
Dept: CARDIOLOGY | Facility: CLINIC | Age: 78
End: 2023-06-08
Payer: COMMERCIAL

## 2023-06-08 DIAGNOSIS — I48.0 PAF (PAROXYSMAL ATRIAL FIBRILLATION) (H): Primary | ICD-10-CM

## 2023-06-08 NOTE — TELEPHONE ENCOUNTER
Please see patient MyChart message and advise:    Dr Avendano,   You requested that I transmit information from Apple watch if I had more Afib occurances.  I can not find a way to share electronically with you.  Since my visit I have had the following 5 notifications on my Apple watch.   June 4 High Heart Rate [id number] BPM   June 4 Atrial Fibrillation5 notifications from 5:07 am              to 6:28 am              lowest 57 and highest 151 during time              lowest 67 and highest 139 during time              lowest 65 and highest 139 during time              lowest 57 and highest 137 during time               lowest 72 and highest 143 during time   May 7  and 8 Atrial Fibrillation 10 notifications from               9:17 PM to 12:11 AM   April 11  4 seperate AFib notifcations with 5 times               recorded in each one-- of the 4 notices-lowest                56 and highest 156.   Should I make an appointment to see you?   Please advise.     Ayla Forman, RN on 6/8/2023 at 3:39 PM

## 2023-06-13 NOTE — TELEPHONE ENCOUNTER
Praveena Avendano, DO  Ayla Forman, RN  Caller: Unspecified (5 days ago,  3:34 PM)  Can she print the rhythm strips? We just have no documentation of AF on heart monitor. Also, Her CHADSCVASC2 score is actually 3 so yes, schedule appt to discuss OAC     Spoke with patient by phone, patient in agreement to come in for OV to discuss OAC.  Ayla Forman, RN on 6/13/2023 at 12:01 PM

## 2023-06-21 ENCOUNTER — OFFICE VISIT (OUTPATIENT)
Dept: CARDIOLOGY | Facility: CLINIC | Age: 78
End: 2023-06-21
Payer: COMMERCIAL

## 2023-06-21 VITALS
SYSTOLIC BLOOD PRESSURE: 136 MMHG | BODY MASS INDEX: 39.65 KG/M2 | DIASTOLIC BLOOD PRESSURE: 82 MMHG | OXYGEN SATURATION: 96 % | HEART RATE: 74 BPM | HEIGHT: 66 IN | WEIGHT: 246.7 LBS

## 2023-06-21 DIAGNOSIS — I48.0 PAF (PAROXYSMAL ATRIAL FIBRILLATION) (H): ICD-10-CM

## 2023-06-21 PROCEDURE — 99214 OFFICE O/P EST MOD 30 MIN: CPT | Performed by: INTERNAL MEDICINE

## 2023-06-21 RX ORDER — METOPROLOL SUCCINATE 25 MG/1
25 TABLET, EXTENDED RELEASE ORAL DAILY
Qty: 30 TABLET | Refills: 11 | Status: SHIPPED | OUTPATIENT
Start: 2023-06-21

## 2023-06-21 NOTE — LETTER
6/21/2023    Anna Jackson  [address] [place] [address]    RE: Aida Alberts       Dear Colleague,     I had the pleasure of seeing Aida Alberts in the [facility].  HPI and Plan:   Aida Alberts is a 77 year old female who presents with history of paroxysmal atrial fibrillation and palpitations presents for a follow-up visit today.  She has been having more frequent episodes of atrial fibrillation noted on her Apple Watch.  Just to review we did have her undergo an echocardiogram and stress test and leadless heart monitor when she was diagnosed with atrial fibrillation.  Her stress test did not indicate evidence of ischemia, her echocardiogram looked normal without significant valve disease and normal function, and her leadless heart monitor did not catch any episodes of atrial fibrillation.  She does have a strong family history of atrial fibrillation.  She had been on aspirin for CVA prophylaxis.  She is here today to discuss oral anticoagulation.  Given her age and gender her TUY0PC0-WQIe 2 score is 3, indicating benefit for oral anticoagulation.  We did discuss the risks benefits of this today.    Recommend:    1.  Paroxysmal atrial fibrillation- I will recommend Eliquis 5 mg twice daily for CVA prophylaxis related to recurrent frequent episodes of paroxysmal atrial fibrillation.  I have given her a prescription for this today and gone over the risks and benefits.  I would also like to start her on low-dose metoprolol for rate control and possible suppression of atrial fibrillation.  We discussed the potential side effects of this medication as well including difficulty with weight loss.  She does have an appointment to discuss GLP-1 agonists with her primary care provider in the near future.    2.  Mild to moderate obstructive sleep apnea- in reviewing pulmonology note from March she was diagnosed with mild to moderate SARAH and recommended to undergo dental appliance  for treatment    3.  Morbid obesity- she is moving into a [facility] and very motivated and working on diet exercise for weight loss.  Again she has an appointment to discuss the GLP-1 agonist with her primary.    I am happy to see her back at any time for future cardiovascular needs.  Please feel free to contact me with any questions you have in regards to her care.       Today's clinic visit entailed:    32 minutes spent by me on the date of the encounter doing chart review, history and exam, documentation and further activities per the note  Provider  Link to MDM Help Grid     The level of medical decision making during this visit was of moderate complexity.    No orders of the defined types were placed in this encounter.    Orders Placed This Encounter   Medications    metoprolol succinate ER (TOPROL XL) 25 MG 24 hr tablet     Sig: Take 1 tablet (25 mg) by mouth daily     Dispense:  30 tablet     Refill:  11    apixaban ANTICOAGULANT (ELIQUIS ANTICOAGULANT) 5 MG tablet     Sig: Take 1 tablet (5 mg) by mouth 2 times daily     Dispense:  60 tablet     Refill:  11     There are no discontinued medications.      Encounter Diagnosis   Name Primary?    PAF (paroxysmal atrial fibrillation) (H)        CURRENT MEDICATIONS:  Current Outpatient Medications   Medication Sig Dispense Refill    acetaminophen (TYLENOL) 325 MG tablet Take 325-650 mg by mouth as needed      apixaban ANTICOAGULANT (ELIQUIS ANTICOAGULANT) 5 MG tablet Take 1 tablet (5 mg) by mouth 2 times daily 60 tablet 11    Cholecalciferol (VITAMIN D3) 2000 UNITS CAPS Take 2,000 Units by mouth Occasionally      magnesium 250 MG tablet Take 1 tablet by mouth daily      metoprolol succinate ER (TOPROL XL) 25 MG 24 hr tablet Take 1 tablet (25 mg) by mouth daily 30 tablet 11    rosuvastatin (CRESTOR) 10 MG tablet Take 20 mg by mouth daily      cyclobenzaprine (FLEXERIL) 10 MG tablet Take 1 tablet by mouth 3 times daily as needed (Patient not taking: Reported on  2/20/2023)      diazepam (VALIUM) 5 MG tablet Take 5-10 mg by mouth (Patient not taking: Reported on 2/20/2023)      fluticasone (FLONASE) 50 MCG/ACT spray Spray 2 sprays in nostril (Patient not taking: Reported on 2/20/2023)      LORazepam (ATIVAN) 1 MG tablet Take 2 tablets (2 mg) by mouth once as needed for anxiety (prior to breast MRI) Take to the appt. And ask nurse when you should take it. Do not operate a vehicle after taking this medication 2 tablet 0    LORazepam (ATIVAN) 1 MG tablet Take 2 tablets (2 mg) by mouth once as needed for anxiety (prior to breast MRI) Take with you to breast MRI .  Do not operate a vehicle after taking this medication (Patient not taking: Reported on 2/20/2023) 1 tablet 1    LORazepam (ATIVAN) 2 MG tablet Take 30 minutes prior to departure for breast MRI.  Do not operate a vehicle after taking this medication (Patient not taking: Reported on 2/20/2023) 1 tablet 0    meloxicam (MOBIC) 7.5 MG tablet Take 1 tablet by mouth 2 times daily as needed (Patient not taking: Reported on 2/20/2023)         ALLERGIES     Allergies   Allergen Reactions    Cefuroxime Hives    Amoxicillin-Pot Clavulanate Nausea and Vomiting       PAST MEDICAL HISTORY:  Past Medical History:   Diagnosis Date    BRCA2 positive 01/22/2015    deletion of exons 5-7, identified using single site testing through [employer]    Breast cancer (H) 01/01/2002    Colon polyp     patient reports about 6 adenomas    Colonic adenoma     5-6 advanced adenomas in 2013 colonoscopy    Fibrocystic breast     Hx antineoplastic chemotherapy     Hx of radiation therapy     Hyperlipidemia     Malignant neoplasm of upper-outer quadrant of right breast in female, estrogen receptor negative (H) 2002    infiltrating ductal carcinoma w/DCIS, Stage IIA (T1c, N1, cM0, Free text: ER-ve,IN-ve,[id number] -ve)       PAST SURGICAL HISTORY:  Past Surgical History:   Procedure Laterality Date    BIOPSY BREAST  01/01/2018    at [facility]     COLONOSCOPY      EXCISE BREAST CYST/FIBROADENOMA/TUMOR/DUCT LESION/NIPPLE LESION/AREOLAR LESION      Description: Breast Surgery Lumpectomy;  Recorded: 2011;  Comments: Breast cancer, 2005    HYSTERECTOMY  2016    LUMPECTOMY BREAST      OOPHORECTOMY         FAMILY HISTORY:  Family History   Problem Relation Age of Onset    Heart Failure Mother          age 96    Heart Disease Father          in his 60s    Stomach Cancer Father         3/4 stomach removed at [place]; possibly 30s or 40s    Ovarian Cancer Sister 77        endometrioid, grade 2    Hereditary Breast and Ovarian Cancer Syndrome Sister         BRCA2 positive, del exons 5-7, [facility] lab    Melanoma Sister 29    Hereditary Breast and Ovarian Cancer Syndrome Sister     Hereditary Breast and Ovarian Cancer Syndrome Sister     Heart Disease Sister         HOCM    Uterine Cancer Sister 76         age 76    Hereditary Breast and Ovarian Cancer Syndrome Brother     Leukemia Maternal Grandmother         chronic typle    Lung Cancer Paternal Uncle 60    Breast Cancer Niece 40        3 occurrences    Hereditary Breast and Ovarian Cancer Syndrome Niece         no cancer       SOCIAL HISTORY:  Social History     Socioeconomic History    Marital status:      Spouse name: NA    Number of children: 2    Years of education: None    Highest education level: None   Occupational History    Occupation: Retired     Comment: [employer] [Other]   Tobacco Use    Smoking status: Former     Packs/day: 0.50     Years: 17.00     Pack years: 8.50     Types: Cigarettes     Start date: 1/3/1965     Quit date: 2008     Years since quittin.4    Smokeless tobacco: Never    Tobacco comments:     1 pack a week until    Substance and Sexual Activity    Alcohol use: Yes     Alcohol/week: 7.0 standard drinks of alcohol    Drug use: No    Sexual activity: Never   Social History Narrative    She previously owned a marketing company. She  "has 2 adopted children and 2 grandchildren and was  in 2014 after 44 years of marriage (he is bipolar).  She lives alone in an apartment.     Social Determinants of Health     Intimate Partner Violence: Not At Risk (12/29/2021)    Humiliation, Afraid, Rape, and Kick questionnaire     Fear of Current or Ex-Partner: No     Emotionally Abused: No     Physically Abused: No     Sexually Abused: No       Review of Systems:  Skin:        Eyes:       ENT:       Respiratory:  Positive for sleep apnea;CPAP  Cardiovascular:  chest pain;Negative;palpitations;lightheadedness;dizziness;edema;fatigue    Gastroenterology:      Genitourinary:       Musculoskeletal:       Neurologic:       Psychiatric:       Heme/Lymph/Imm:       Endocrine:  Negative      Physical Exam:  Vitals: /82 (BP Location: Left arm, Patient Position: Sitting)   Pulse 74   Ht 1.676 m (5' 6\")   Wt 111.9 kg (246 lb 11.2 oz)   LMP  (LMP Unknown)   SpO2 96%   BMI 39.82 kg/m      Constitutional:           Skin:             Head:           Eyes:           Lymph:      ENT:           Neck:           Respiratory:            Cardiac:                                                           GI:           Extremities and Muscular Skeletal:                 Neurological:           Psych:           Recent Lab Results:  LIPID RESULTS:  Lab Results   Component Value Date    CHOL 250 (H) 03/01/2018    HDL 51 03/01/2018     (H) 03/01/2018    TRIG 197 (H) 03/01/2018       LIVER ENZYME RESULTS:  Lab Results   Component Value Date    AST 28 03/01/2018    ALT 43 03/01/2018       CBC RESULTS:  Lab Results   Component Value Date    WBC 8.6 01/22/2023    RBC 5.20 01/22/2023    HGB 16.1 (H) 01/22/2023    HCT 49.3 (H) 01/22/2023    MCV 95 01/22/2023    MCH 31.0 01/22/2023    MCHC 32.7 01/22/2023    RDW 13.7 01/22/2023     01/22/2023       BMP RESULTS:  Lab Results   Component Value Date     01/22/2023    POTASSIUM 4.2 01/22/2023    POTASSIUM 4.9 " 06/08/2018    CHLORIDE 101 01/22/2023    CHLORIDE 101 06/08/2018    CO2 26 01/22/2023    CO2 27 06/08/2018    ANIONGAP 13 01/22/2023    ANIONGAP 12 06/08/2018    GLC 86 01/22/2023    GLC 74 06/08/2018    BUN 19.6 01/22/2023    BUN 16 06/08/2018    CR 0.54 01/22/2023    GFRESTIMATED >90 01/22/2023    GFRESTIMATED >90 06/18/2020    GFRESTBLACK >90 06/18/2020    MATHEUS 9.5 01/22/2023        A1C RESULTS:  No results found for: A1C    INR RESULTS:  No results found for: INR        CC  Praveena Avendano DO  [address]  [place] [id number]        Thank you for allowing me to participate in the care of your patient.      Sincerely,     Praveena Avendano DO     [facility]

## 2023-06-21 NOTE — PROGRESS NOTES
HPI and Plan:   Aida Alberts is a 77 year old female who presents with history of paroxysmal atrial fibrillation and palpitations presents for a follow-up visit today.  She has been having more frequent episodes of atrial fibrillation noted on her Apple Watch.  Just to review we did have her undergo an echocardiogram and stress test and leadless heart monitor when she was diagnosed with atrial fibrillation.  Her stress test did not indicate evidence of ischemia, her echocardiogram looked normal without significant valve disease and normal function, and her leadless heart monitor did not catch any episodes of atrial fibrillation.  She does have a strong family history of atrial fibrillation.  She had been on aspirin for CVA prophylaxis.  She is here today to discuss oral anticoagulation.  Given her age and gender her [id number] 2 score is 3, indicating benefit for oral anticoagulation.  We did discuss the risks benefits of this today.    Recommend:    1.  Paroxysmal atrial fibrillation- I will recommend Eliquis 5 mg twice daily for CVA prophylaxis related to recurrent frequent episodes of paroxysmal atrial fibrillation.  I have given her a prescription for this today and gone over the risks and benefits.  I would also like to start her on low-dose metoprolol for rate control and possible suppression of atrial fibrillation.  We discussed the potential side effects of this medication as well including difficulty with weight loss.  She does have an appointment to discuss GLP-1 agonists with her primary care provider in the near future.    2.  Mild to moderate obstructive sleep apnea- in reviewing pulmonology note from March she was diagnosed with mild to moderate SARAH and recommended to undergo dental appliance for treatment    3.  Morbid obesity- she is moving into a [facility] and very motivated and working on diet exercise for weight loss.  Again she has an appointment to discuss the GLP-1 agonist with her  primary.    I am happy to see her back at any time for future cardiovascular needs.  Please feel free to contact me with any questions you have in regards to her care.       Today's clinic visit entailed:    32 minutes spent by me on the date of the encounter doing chart review, history and exam, documentation and further activities per the note  Provider  Link to [facility] Help Grid     The level of medical decision making during this visit was of moderate complexity.    No orders of the defined types were placed in this encounter.    Orders Placed This Encounter   Medications     metoprolol succinate ER (TOPROL XL) 25 MG 24 hr tablet     Sig: Take 1 tablet (25 mg) by mouth daily     Dispense:  30 tablet     Refill:  11     apixaban ANTICOAGULANT (ELIQUIS ANTICOAGULANT) 5 MG tablet     Sig: Take 1 tablet (5 mg) by mouth 2 times daily     Dispense:  60 tablet     Refill:  11     There are no discontinued medications.      Encounter Diagnosis   Name Primary?     PAF (paroxysmal atrial fibrillation) (H)        CURRENT MEDICATIONS:  Current Outpatient Medications   Medication Sig Dispense Refill     acetaminophen (TYLENOL) 325 MG tablet Take 325-650 mg by mouth as needed       apixaban ANTICOAGULANT (ELIQUIS ANTICOAGULANT) 5 MG tablet Take 1 tablet (5 mg) by mouth 2 times daily 60 tablet 11     Cholecalciferol (VITAMIN D3) 2000 UNITS CAPS Take 2,000 Units by mouth Occasionally       magnesium 250 MG tablet Take 1 tablet by mouth daily       metoprolol succinate ER (TOPROL XL) 25 MG 24 hr tablet Take 1 tablet (25 mg) by mouth daily 30 tablet 11     rosuvastatin (CRESTOR) 10 MG tablet Take 20 mg by mouth daily       cyclobenzaprine (FLEXERIL) 10 MG tablet Take 1 tablet by mouth 3 times daily as needed (Patient not taking: Reported on 2/20/2023)       diazepam (VALIUM) 5 MG tablet Take 5-10 mg by mouth (Patient not taking: Reported on 2/20/2023)       fluticasone (FLONASE) 50 MCG/ACT spray Spray 2 sprays in nostril (Patient  not taking: Reported on 2/20/2023)       LORazepam (ATIVAN) 1 MG tablet Take 2 tablets (2 mg) by mouth once as needed for anxiety (prior to breast MRI) Take to the appt. And ask nurse when you should take it. Do not operate a vehicle after taking this medication 2 tablet 0     LORazepam (ATIVAN) 1 MG tablet Take 2 tablets (2 mg) by mouth once as needed for anxiety (prior to breast MRI) Take with you to breast MRI .  Do not operate a vehicle after taking this medication (Patient not taking: Reported on 2/20/2023) 1 tablet 1     LORazepam (ATIVAN) 2 MG tablet Take 30 minutes prior to departure for breast MRI.  Do not operate a vehicle after taking this medication (Patient not taking: Reported on 2/20/2023) 1 tablet 0     meloxicam (MOBIC) 7.5 MG tablet Take 1 tablet by mouth 2 times daily as needed (Patient not taking: Reported on 2/20/2023)         ALLERGIES     Allergies   Allergen Reactions     Cefuroxime Hives     Amoxicillin-Pot Clavulanate Nausea and Vomiting       PAST MEDICAL HISTORY:  Past Medical History:   Diagnosis Date     BRCA2 positive 01/22/2015    deletion of exons 5-7, identified using single site testing through [employer]     Breast cancer (H) 01/01/2002     Colon polyp     patient reports about 6 adenomas     Colonic adenoma     5-6 advanced adenomas in 2013 colonoscopy     Fibrocystic breast      Hx antineoplastic chemotherapy      Hx of radiation therapy      Hyperlipidemia      Malignant neoplasm of upper-outer quadrant of right breast in female, estrogen receptor negative (H) 2002    infiltrating ductal carcinoma w/DCIS, Stage IIA (T1c, N1, cM0, Free text: ER-ve,IL-ve,[id number] -ve)       PAST SURGICAL HISTORY:  Past Surgical History:   Procedure Laterality Date     BIOPSY BREAST  01/01/2018    at [facility]     COLONOSCOPY       EXCISE BREAST CYST/FIBROADENOMA/TUMOR/DUCT LESION/NIPPLE LESION/AREOLAR LESION      Description: Breast Surgery Lumpectomy;  Recorded: 01/07/2011;  Comments:  Breast cancer, 2005     HYSTERECTOMY  2016     LUMPECTOMY BREAST       OOPHORECTOMY         FAMILY HISTORY:  Family History   Problem Relation Age of Onset     Heart Failure Mother          age 96     Heart Disease Father          in his 60s     Stomach Cancer Father         3/4 stomach removed at [place]; possibly 30s or 40s     Ovarian Cancer Sister 77        endometrioid, grade 2     Hereditary Breast and Ovarian Cancer Syndrome Sister         BRCA2 positive, del exons 5-7, [facility] lab     Melanoma Sister 29     Hereditary Breast and Ovarian Cancer Syndrome Sister      Hereditary Breast and Ovarian Cancer Syndrome Sister      Heart Disease Sister         HOCM     Uterine Cancer Sister 76         age 76     Hereditary Breast and Ovarian Cancer Syndrome Brother      Leukemia Maternal Grandmother         chronic typle     Lung Cancer Paternal Uncle 60     Breast Cancer Niece 40        3 occurrences     Hereditary Breast and Ovarian Cancer Syndrome Niece         no cancer       SOCIAL HISTORY:  Social History     Socioeconomic History     Marital status:      Spouse name: NA     Number of children: 2     Years of education: None     Highest education level: None   Occupational History     Occupation: Retired     Comment: [employer] [Other]   Tobacco Use     Smoking status: Former     Packs/day: 0.50     Years: 17.00     Pack years: 8.50     Types: Cigarettes     Start date: 1/3/1965     Quit date: 2008     Years since quittin.4     Smokeless tobacco: Never     Tobacco comments:     1 pack a week until    Substance and Sexual Activity     Alcohol use: Yes     Alcohol/week: 7.0 standard drinks of alcohol     Drug use: No     Sexual activity: Never   Social History Narrative    She previously owned a marketing company. She has 2 adopted children and 2 grandchildren and was  in  after 44 years of marriage (he is bipolar).  She lives alone in an apartment.  "    Social Determinants of Health     Intimate Partner Violence: Not At Risk (12/29/2021)    Humiliation, Afraid, Rape, and Kick questionnaire      Fear of Current or Ex-Partner: No      Emotionally Abused: No      Physically Abused: No      Sexually Abused: No       Review of Systems:  Skin:        Eyes:       ENT:       Respiratory:  Positive for sleep apnea;CPAP  Cardiovascular:  chest pain;Negative;palpitations;lightheadedness;dizziness;edema;fatigue    Gastroenterology:      Genitourinary:       Musculoskeletal:       Neurologic:       Psychiatric:       Heme/Lymph/Imm:       Endocrine:  Negative      Physical Exam:  Vitals: /82 (BP Location: Left arm, Patient Position: Sitting)   Pulse 74   Ht 1.676 m (5' 6\")   Wt 111.9 kg (246 lb 11.2 oz)   LMP  (LMP Unknown)   SpO2 96%   BMI 39.82 kg/m      Constitutional:           Skin:             Head:           Eyes:           Lymph:      ENT:           Neck:           Respiratory:            Cardiac:                                                           GI:           Extremities and Muscular Skeletal:                 Neurological:           Psych:           Recent Lab Results:  LIPID RESULTS:  Lab Results   Component Value Date    CHOL 250 (H) 03/01/2018    HDL 51 03/01/2018     (H) 03/01/2018    TRIG 197 (H) 03/01/2018       LIVER ENZYME RESULTS:  Lab Results   Component Value Date    AST 28 03/01/2018    ALT 43 03/01/2018       CBC RESULTS:  Lab Results   Component Value Date    WBC 8.6 01/22/2023    RBC 5.20 01/22/2023    HGB 16.1 (H) 01/22/2023    HCT 49.3 (H) 01/22/2023    MCV 95 01/22/2023    MCH 31.0 01/22/2023    MCHC 32.7 01/22/2023    RDW 13.7 01/22/2023     01/22/2023       BMP RESULTS:  Lab Results   Component Value Date     01/22/2023    POTASSIUM 4.2 01/22/2023    POTASSIUM 4.9 06/08/2018    CHLORIDE 101 01/22/2023    CHLORIDE 101 06/08/2018    CO2 26 01/22/2023    CO2 27 06/08/2018    ANIONGAP 13 01/22/2023    ANIONGAP " 12 06/08/2018    GLC 86 01/22/2023    GLC 74 06/08/2018    BUN 19.6 01/22/2023    BUN 16 06/08/2018    CR 0.54 01/22/2023    GFRESTIMATED >90 01/22/2023    GFRESTIMATED >90 06/18/2020    GFRESTBLACK >90 06/18/2020    MATHEUS 9.5 01/22/2023        A1C RESULTS:  No results found for: A1C    INR RESULTS:  No results found for: INR        CC  Praveena Avendano DO  [address]  [place] [id number]

## 2023-07-19 ENCOUNTER — HOSPITAL ENCOUNTER (OUTPATIENT)
Dept: MAMMOGRAPHY | Facility: CLINIC | Age: 78
Discharge: HOME | End: 2023-07-19
Attending: CLINICAL NURSE SPECIALIST | Admitting: CLINICAL NURSE SPECIALIST
Payer: COMMERCIAL

## 2023-07-19 DIAGNOSIS — Z12.31 VISIT FOR SCREENING MAMMOGRAM: ICD-10-CM

## 2023-07-19 PROCEDURE — 77067 SCR MAMMO BI INCL CAD: CPT

## 2023-12-29 ENCOUNTER — TELEPHONE (OUTPATIENT)
Dept: CARDIOLOGY | Facility: CLINIC | Age: 78
End: 2023-12-29
Payer: COMMERCIAL

## 2023-12-29 NOTE — TELEPHONE ENCOUNTER
[facility] Call Center    Phone Message    May a detailed message be left on voicemail: yes     Reason for Call: Medication Question or concern regarding medication   Prescription Clarification  Name of Medication: apixaban ANTICOAGULANT (ELIQUIS ANTICOAGULANT) 5 MG tablet   Prescribing Provider: Romana   Pharmacy:   [id number] - [place], [place] - [address] AT COR[facility] OF [facility]      What on the order needs clarification? [facility] called requesting a 2 day hold order on this medication. Patient has a colonoscopy on 1/29. For further information, please contact [facility].    Action Taken: Message routed to:  Other: Cardiology    Travel Screening: Not Applicable    Thank you!  [facility]

## 2024-01-02 NOTE — TELEPHONE ENCOUNTER
Routing to provider to approve 2 day hold of Eliquis for colonoscopy 1/29/24.  Ayla Forman RN on 1/2/2024 at 1:38 PM

## 2024-02-27 ENCOUNTER — TELEPHONE (OUTPATIENT)
Dept: ONCOLOGY | Facility: HOSPITAL | Age: 79
End: 2024-02-27
Payer: COMMERCIAL

## 2024-03-11 ENCOUNTER — TRANSFERRED RECORDS (OUTPATIENT)
Dept: HEALTH INFORMATION MANAGEMENT | Facility: CLINIC | Age: 79
End: 2024-03-11
Payer: COMMERCIAL

## 2024-04-03 ENCOUNTER — TRANSFERRED RECORDS (OUTPATIENT)
Dept: HEALTH INFORMATION MANAGEMENT | Facility: CLINIC | Age: 79
End: 2024-04-03

## 2024-04-04 ENCOUNTER — TRANSFERRED RECORDS (OUTPATIENT)
Dept: HEALTH INFORMATION MANAGEMENT | Facility: CLINIC | Age: 79
End: 2024-04-04

## 2024-04-08 ENCOUNTER — ONCOLOGY VISIT (OUTPATIENT)
Dept: ONCOLOGY | Facility: HOSPITAL | Age: 79
End: 2024-04-08
Attending: INTERNAL MEDICINE
Payer: COMMERCIAL

## 2024-04-08 ENCOUNTER — PATIENT OUTREACH (OUTPATIENT)
Dept: ONCOLOGY | Facility: HOSPITAL | Age: 79
End: 2024-04-08
Payer: COMMERCIAL

## 2024-04-08 VITALS
SYSTOLIC BLOOD PRESSURE: 133 MMHG | HEIGHT: 65 IN | TEMPERATURE: 98.5 F | BODY MASS INDEX: 40.64 KG/M2 | DIASTOLIC BLOOD PRESSURE: 58 MMHG | OXYGEN SATURATION: 96 % | RESPIRATION RATE: 16 BRPM | WEIGHT: 243.9 LBS | HEART RATE: 77 BPM

## 2024-04-08 DIAGNOSIS — Z15.01 BRCA2 POSITIVE: Primary | ICD-10-CM

## 2024-04-08 DIAGNOSIS — Z17.1 MALIGNANT NEOPLASM OF UPPER-OUTER QUADRANT OF RIGHT BREAST IN FEMALE, ESTROGEN RECEPTOR NEGATIVE (H): ICD-10-CM

## 2024-04-08 DIAGNOSIS — C50.411 MALIGNANT NEOPLASM OF UPPER-OUTER QUADRANT OF RIGHT BREAST IN FEMALE, ESTROGEN RECEPTOR NEGATIVE (H): ICD-10-CM

## 2024-04-08 DIAGNOSIS — Z15.09 BRCA2 POSITIVE: Primary | ICD-10-CM

## 2024-04-08 DIAGNOSIS — Z12.39 BREAST CANCER SCREENING, HIGH RISK PATIENT: ICD-10-CM

## 2024-04-08 PROCEDURE — G0463 HOSPITAL OUTPT CLINIC VISIT: HCPCS | Performed by: INTERNAL MEDICINE

## 2024-04-08 PROCEDURE — 99213 OFFICE O/P EST LOW 20 MIN: CPT | Performed by: INTERNAL MEDICINE

## 2024-04-08 PROCEDURE — G2211 COMPLEX E/M VISIT ADD ON: HCPCS | Performed by: INTERNAL MEDICINE

## 2024-04-08 RX ORDER — ALBUTEROL SULFATE 90 UG/1
2 AEROSOL, METERED RESPIRATORY (INHALATION) EVERY 4 HOURS PRN
COMMUNITY
Start: 2023-10-16

## 2024-04-08 RX ORDER — ESCITALOPRAM OXALATE 5 MG/1
1 TABLET ORAL
COMMUNITY
Start: 2024-03-26

## 2024-04-08 RX ORDER — HYDROXYZINE HYDROCHLORIDE 10 MG/1
10-20 TABLET, FILM COATED ORAL
COMMUNITY
Start: 2023-10-29

## 2024-04-08 RX ORDER — FLUTICASONE PROPIONATE AND SALMETEROL 100; 50 UG/1; UG/1
1 POWDER RESPIRATORY (INHALATION) 2 TIMES DAILY
COMMUNITY
Start: 2024-02-01

## 2024-04-08 RX ORDER — TRIAMCINOLONE ACETONIDE 1 MG/G
CREAM TOPICAL
COMMUNITY
Start: 2023-10-31

## 2024-04-08 ASSESSMENT — PAIN SCALES - GENERAL: PAINLEVEL: MILD PAIN (2)

## 2024-04-08 NOTE — PROGRESS NOTES
[facility]                                                                                            Situation: Patient chart reviewed by care coordinator.    Background: Patient in today for yearly follow-up in regards to her diagnosis of breast cancer.    Assessment: It was recommended that the patient come back in 1 year with an MRI of the breast prior.    Plan/Recommendations: Since she is on yearly follow-up, I have \"resolved\" her from cancer care coordination episode and \"graduated\" her from enrollment.    Checkout note has been deferred until 2/1/2025.    Signature:  Mila Dasilva RN    "

## 2024-04-08 NOTE — PROGRESS NOTES
[facility] Hematology and Oncology Progress Note    Patient: Aida Alberts  MRN: [id number]  Date of Service: 2024       Reason for visit         Problem List Items Addressed This Visit          Other    BRCA2 positive - Primary    Malignant neoplasm of upper-outer quadrant of right breast in female, estrogen receptor negative (H)     Other Visit Diagnoses       Breast cancer screening, high risk patient                Assessment      1. A very pleasant 78 year old  woman with history of breast cancer diagnosed in   status post lumpectomy, adjuvant chemotherapy and radiation therapy. This was triple negative so no hormone therapy was needed.  2018 MRI showed questionable shadow in the left breast.  Biopsy showed no evidence of any disease only some fibro-cystic changes and minimal proliferative changes.  Last mammogram is negative.  2. High risk BRCA2 positive. Her one sister  after being diagnosed with stage IV ovarian cancer. S/P b/l ADONAY and BSO.  She unfortunately passed away in .  3. History of smoking in the past.  No evidence of any recurrence.  4. Patient herself has no biological children.  5. Afib for which she is on Elequis    Plan      For breast cancer we will continue with yearly breast MRIs alternating with mammogram.  Annual breast exams with her primary care as well.  Continue with good diet and exercise.  Follow-up in a yearly basis.  The longitudinal plan of care for the diagnosis(es)/condition(s) as documented were addressed during this visit. Due to the added complexity in care, I will continue to support Aida in the subsequent management and with ongoing continuity of care.       Medical decision making      Moderately complex.      Risk      Moderate risk of morbidity mortality.  Significant risk of side effects from intervention.       Cancer Staging   No matching staging information was found for the patient.          History of present illness       Ms. Aida Alberts is a very pleasant 78 year old  female with a history of breast cancer diagnosed in  located on the right side measuring 1.6 cm in size presenting as a mammogram otherwise completely asymptomatic for which she had lumpectomy and sentinel lymph node biopsy. [place] lymph node biopsy did show some positive cells on immunohistochemistry in her lymph node. Subsequent to that she underwent adjuvant chemotherapy with a dose dense [place] and cyclophosphamide for 4 cycles. Then she went on to have radiation therapy. Then at [facility] they discovered that the lymph node might have been positive so she was given adjuvant Taxol therapy as well. This was triple negative type of breast cancer so no adjuvant hormonal therapy was given. She has since been followed between [facility] and her regular doctor. No evidence of any recurrence. In Summer of 2015 she was found to be positive for BRCA2 mutation. This came to light after her sister was diagnosed with ovarian cancer. She was tested and was found to be positive for BRCA2. Subsequent to that patient and her other siblings were also detected to be positive. Interestingly patient has 3 other unaffected siblings who are all positive as well.  Patient's father  of heart problems. One of his brother  of lung cancer. One of her nieces, sister's daughter was diagnosed with breast cancer at age 40. However she tested negative for BRCA2 mutation.    She was counseled to have her ovaries removed. In 2016 she underwent ADONAY with BSO. No malignancy was found. She recovering well from her surgery.     She had MRI of the breast done at [facility] in 2018.  That showed that behind the nipple on her left breast there were some shadows which was suspicious for malignancy.  She was recommended to have biopsy.  That biopsy was negative as well.    She has been following up with high risk genetics clinic.  She got an MRI of her breast done  "earlier in January 2023.  Reportedly it was normal.   She is otherwise doing fine.  On review of her family history she updated that one of her sisters who had ovarian cancer did die about 3 years ago.    Comes in today for scheduled follow-up.  Overall seems to be doing okay.  Working with a  to improve her muscle strength.  No new medical issues.  Enjoying her FDC.      Review of system      Details noted in the history of present illness.  A detailed review of systems is otherwise negative.      Physical exam        /58 (BP Location: Left arm, Patient Position: Sitting, Cuff Size: Adult Large)   Pulse 77   Temp 98.5  F (36.9  C) (Oral)   Resp 16   Ht 1.638 m (5' 4.5\")   Wt 110.6 kg (243 lb 14.4 oz)   LMP  (LMP Unknown)   SpO2 96%   BMI 41.22 kg/m      GENERAL: No acute distress. Cooperative in conversation.   HEENT:  Pupils are equal, round and reactive. Oral mucosa is clean and intact. No ulcerations or mucositis noted. No bleeding noted.  RESP:Chest symmetric lungs are clear bilaterally per auscultation. Regular respiratory rate. No wheezes or rhonchi.  CV: Normal S1 S2 Regular, rate and rhythm.     ABD: Nondistended, soft, nontender. Positive bowel sounds. No organomegaly.   EXTREMITIES: No lower extremity edema.   NEURO: Non- focal. Alert and oriented x3.  Cranial nerves appear intact.  PSYCH: Within normal limits. No depression or anxiety.  SKIN: Warm dry intact.  BREAST; b/l breast exam done. Right breast is s/p lumpectomy with XRT. Slightly smaller than the left. Right breast is dense with some nodules felt on the 9 o'clock position. No nipple discharge. No axillary LN enlargement noted.      Lab results Reviewed      No results found for this or any previous visit (from the past 720 hour(s)).      Imaging results Reviewed        MR External Imaging Breast    Result Date: 3/18/2024  Images were obtained from an external facility.  Click PACS Images hyperlink to view " images.  Textual results have been scanned into the media tab.       Signed by: Laz Hernándze MD      This note has been dictated using voice recognition software. Any grammatical or context distortions are unintentional and inherent to the software

## 2024-04-08 NOTE — PROGRESS NOTES
"Oncology Rooming Note    April 8, 2024 2:53 PM   Aida Alberts is a 78 year old female who presents for:    Chief Complaint   Patient presents with    Oncology Clinic Visit     Return visit 1 year MR breast 03/11/24. BRCA2 positive.      Initial Vitals: /58 (BP Location: Left arm, Patient Position: Sitting, Cuff Size: Adult Large)   Pulse 77   Temp 98.5  F (36.9  C) (Oral)   Resp 16   Ht 1.638 m (5' 4.5\")   Wt 110.6 kg (243 lb 14.4 oz)   LMP  (LMP Unknown)   SpO2 96%   BMI 41.22 kg/m   Estimated body mass index is 41.22 kg/m  as calculated from the following:    Height as of this encounter: 1.638 m (5' 4.5\").    Weight as of this encounter: 110.6 kg (243 lb 14.4 oz). Body surface area is 2.24 meters squared.  Mild Pain (2) Comment: Data Unavailable   No LMP recorded (lmp unknown). Patient is postmenopausal.  Allergies reviewed: Yes  Medications reviewed: Yes    Medications: Medication refills not needed today.  Pharmacy name entered into [employer]:    [id number] - [place], [place] - [address] [id number] - [place], [place] - [address] [id number] [place], [place] - [address] [facility]     Frailty Screening:   Is the patient here for a new oncology consult visit in cancer care? 2. No      Clinical concerns: Return visit 1 year MR breast 03/11/24. BRCA2 positive.      Dorie Bean CMA            "

## 2024-04-08 NOTE — LETTER
"    4/8/2024         RE: Aida Alberts  [address]   [address]  [place] [address]        Dear Colleague,    Thank you for referring your patient, Aida Alberts, to the [facility] [place]. Please see a copy of my visit note below.    Oncology Rooming Note    April 8, 2024 2:53 PM   Aida Alberts is a 78 year old female who presents for:    Chief Complaint   Patient presents with     Oncology Clinic Visit     Return visit 1 year  breast 03/11/24. BRCA2 positive.      Initial Vitals: /58 (BP Location: Left arm, Patient Position: Sitting, Cuff Size: Adult Large)   Pulse 77   Temp 98.5  F (36.9  C) (Oral)   Resp 16   Ht 1.638 m (5' 4.5\")   Wt 110.6 kg (243 lb 14.4 oz)   LMP  (LMP Unknown)   SpO2 96%   BMI 41.22 kg/m   Estimated body mass index is 41.22 kg/m  as calculated from the following:    Height as of this encounter: 1.638 m (5' 4.5\").    Weight as of this encounter: 110.6 kg (243 lb 14.4 oz). Body surface area is 2.24 meters squared.  Mild Pain (2) Comment: Data Unavailable   No LMP recorded (lmp unknown). Patient is postmenopausal.  Allergies reviewed: Yes  Medications reviewed: Yes    Medications: Medication refills not needed today.  Pharmacy name entered into [employer]:    [facility] [facility] [address] [facility][id number] - [place], [place] - [address] [facility] [id number] [facility] [address] [facility]     Frailty Screening:   Is the patient here for a new oncology consult visit in cancer care? 2. No      Clinical concerns: Return visit 1 year  breast 03/11/24. BRCA2 positive.      Dorie Bean, [facility] Hematology and Oncology Progress Note    Patient: Aida Alberts  MRN: [id number]  Date of Service: Apr 8, 2024       Reason for visit         Problem List Items Addressed This Visit          Other    BRCA2 positive - Primary    " Malignant neoplasm of upper-outer quadrant of right breast in female, estrogen receptor negative (H)     Other Visit Diagnoses       Breast cancer screening, high risk patient                Assessment      1. A very pleasant 78 year old  woman with history of breast cancer diagnosed in   status post lumpectomy, adjuvant chemotherapy and radiation therapy. This was triple negative so no hormone therapy was needed.  2018 MRI showed questionable shadow in the left breast.  Biopsy showed no evidence of any disease only some fibro-cystic changes and minimal proliferative changes.  Last mammogram is negative.  2. High risk BRCA2 positive. Her one sister  after being diagnosed with stage IV ovarian cancer. S/P b/l ADONAY and BSO.  She unfortunately passed away in .  3. History of smoking in the past.  No evidence of any recurrence.  4. Patient herself has no biological children.  5. Afib for which she is on Elequis    Plan      For breast cancer we will continue with yearly breast MRIs alternating with mammogram.  Annual breast exams with her primary care as well.  Continue with good diet and exercise.  Follow-up in a yearly basis.  The longitudinal plan of care for the diagnosis(es)/condition(s) as documented were addressed during this visit. Due to the added complexity in care, I will continue to support Aida in the subsequent management and with ongoing continuity of care.       Medical decision making      Moderately complex.      Risk      Moderate risk of morbidity mortality.  Significant risk of side effects from intervention.       Cancer Staging   No matching staging information was found for the patient.          History of present illness      Ms. Aida Alberts is a very pleasant 78 year old  female with a history of breast cancer diagnosed in  located on the right side measuring 1.6 cm in size presenting as a mammogram otherwise completely asymptomatic for which she had lumpectomy  and sentinel lymph node biopsy. [place] lymph node biopsy did show some positive cells on immunohistochemistry in her lymph node. Subsequent to that she underwent adjuvant chemotherapy with a dose dense [place] and cyclophosphamide for 4 cycles. Then she went on to have radiation therapy. Then at [facility] they discovered that the lymph node might have been positive so she was given adjuvant Taxol therapy as well. This was triple negative type of breast cancer so no adjuvant hormonal therapy was given. She has since been followed between [facility] and her regular doctor. No evidence of any recurrence. In Summer of 2015 she was found to be positive for BRCA2 mutation. This came to light after her sister was diagnosed with ovarian cancer. She was tested and was found to be positive for BRCA2. Subsequent to that patient and her other siblings were also detected to be positive. Interestingly patient has 3 other unaffected siblings who are all positive as well.  Patient's father  of heart problems. One of his brother  of lung cancer. One of her nieces, sister's daughter was diagnosed with breast cancer at age 40. However she tested negative for BRCA2 mutation.    She was counseled to have her ovaries removed. In 2016 she underwent ADONAY with BSO. No malignancy was found. She recovering well from her surgery.     She had MRI of the breast done at [facility] in 2018.  That showed that behind the nipple on her left breast there were some shadows which was suspicious for malignancy.  She was recommended to have biopsy.  That biopsy was negative as well.    She has been following up with high risk genetics clinic.  She got an MRI of her breast done earlier in 2023.  Reportedly it was normal.   She is otherwise doing fine.  On review of her family history she updated that one of her sisters who had ovarian cancer did die about 3 years ago.    Comes in today for scheduled follow-up.  Overall  "seems to be doing okay.  Working with a  to improve her muscle strength.  No new medical issues.  Enjoying her detention.      Review of system      Details noted in the history of present illness.  A detailed review of systems is otherwise negative.      Physical exam        /58 (BP Location: Left arm, Patient Position: Sitting, Cuff Size: Adult Large)   Pulse 77   Temp 98.5  F (36.9  C) (Oral)   Resp 16   Ht 1.638 m (5' 4.5\")   Wt 110.6 kg (243 lb 14.4 oz)   LMP  (LMP Unknown)   SpO2 96%   BMI 41.22 kg/m      GENERAL: No acute distress. Cooperative in conversation.   HEENT:  Pupils are equal, round and reactive. Oral mucosa is clean and intact. No ulcerations or mucositis noted. No bleeding noted.  RESP:Chest symmetric lungs are clear bilaterally per auscultation. Regular respiratory rate. No wheezes or rhonchi.  CV: Normal S1 S2 Regular, rate and rhythm.     ABD: Nondistended, soft, nontender. Positive bowel sounds. No organomegaly.   EXTREMITIES: No lower extremity edema.   NEURO: Non- focal. Alert and oriented x3.  Cranial nerves appear intact.  PSYCH: Within normal limits. No depression or anxiety.  SKIN: Warm dry intact.  BREAST; b/l breast exam done. Right breast is s/p lumpectomy with XRT. Slightly smaller than the left. Right breast is dense with some nodules felt on the 9 o'clock position. No nipple discharge. No axillary LN enlargement noted.      Lab results Reviewed      No results found for this or any previous visit (from the past 720 hour(s)).      Imaging results Reviewed        MR External Imaging Breast    Result Date: 3/18/2024  Images were obtained from an external facility.  Click PACS Images hyperlink to view images.  Textual results have been scanned into the media tab.       Signed by: Laz Hernández MD      This note has been dictated using voice recognition software. Any grammatical or context distortions are unintentional and inherent to the " software      Again, thank you for allowing me to participate in the care of your patient.        Sincerely,        Laz Hernández MD

## 2024-04-09 ENCOUNTER — TELEPHONE (OUTPATIENT)
Dept: ONCOLOGY | Facility: CLINIC | Age: 79
End: 2024-04-09
Payer: COMMERCIAL

## 2024-04-09 NOTE — PROGRESS NOTES
CLINICAL NUTRITION SERVICES     Reason for Contact: Questions from Oncology Distress Screening  1. How concerned are you about your ability to eat? :  0  2. How concerned are you about unintended weight loss or your current weight? : 8  9. If you want to be contacted by one of our professionals, I can send a message to them right now.: Oncology Dietitian    Action: RD called patient indicating reason for phone call. Left a VM with a return call back number.     Follow up: Wait for a return phone call.    Anamaria Robins RD, KARIN  [place] office [phone]

## 2024-05-30 ENCOUNTER — TRANSFERRED RECORDS (OUTPATIENT)
Dept: HEALTH INFORMATION MANAGEMENT | Facility: CLINIC | Age: 79
End: 2024-05-30

## 2024-06-02 ENCOUNTER — HEALTH MAINTENANCE LETTER (OUTPATIENT)
Age: 79
End: 2024-06-02

## 2024-07-09 ENCOUNTER — MYC MEDICAL ADVICE (OUTPATIENT)
Dept: CARDIOLOGY | Facility: CLINIC | Age: 79
End: 2024-07-09
Payer: COMMERCIAL

## 2024-07-09 DIAGNOSIS — I48.0 PAF (PAROXYSMAL ATRIAL FIBRILLATION) (H): ICD-10-CM

## 2024-07-09 NOTE — TELEPHONE ENCOUNTER
[facility] Cardiology Refill Guideline reviewed.  Medication meets criteria for refill.  Patient is due for OV, [employer] message sent to patient indicating need for visit.  4 months supply filled due to access issues with scheduling.  Ayla Forman RN on 7/9/2024 at 11:53 AM

## 2024-07-16 ENCOUNTER — OFFICE VISIT (OUTPATIENT)
Dept: URGENT CARE | Facility: URGENT CARE | Age: 79
End: 2024-07-16
Payer: COMMERCIAL

## 2024-07-16 VITALS
RESPIRATION RATE: 28 BRPM | SYSTOLIC BLOOD PRESSURE: 120 MMHG | BODY MASS INDEX: 39.71 KG/M2 | DIASTOLIC BLOOD PRESSURE: 71 MMHG | HEART RATE: 67 BPM | OXYGEN SATURATION: 95 % | TEMPERATURE: 97.6 F | WEIGHT: 235 LBS

## 2024-07-16 DIAGNOSIS — L84 CORN OR CALLUS: Primary | ICD-10-CM

## 2024-07-16 PROCEDURE — 99203 OFFICE O/P NEW LOW 30 MIN: CPT | Performed by: PHYSICIAN ASSISTANT

## 2024-07-16 RX ORDER — CLINDAMYCIN HCL 300 MG
CAPSULE ORAL
COMMUNITY
Start: 2024-07-14

## 2024-07-16 RX ORDER — CETIRIZINE HYDROCHLORIDE 10 MG/1
TABLET, CHEWABLE ORAL
COMMUNITY
Start: 2024-06-07

## 2024-07-16 RX ORDER — CHLORHEXIDINE GLUCONATE ORAL RINSE 1.2 MG/ML
SOLUTION DENTAL
COMMUNITY
Start: 2024-07-14

## 2024-07-16 NOTE — PATIENT INSTRUCTIONS
July 16, 2024 Urgent Care Plan:     -Salicylic acid (one drop to corn once daily) until corn resolved (maximum of 14 days)     -Keep your already scheduled appointment with your podiatrist for Friday      -Watch for infection (as we reviewed today).

## 2024-07-16 NOTE — PROGRESS NOTES
ASSESSMENT/PLAN:    (L84) Corn or callus  (primary encounter diagnosis)  Plan: salicylic acid (COMPOUND W MAX STRENGTH) 17 %         external gel          AVS-Plan:     July 16, 2024 Urgent Care Plan:     -Salicylic acid (one drop to corn once daily) until corn resolved (maximum of 14 days)     -Keep your already scheduled appointment with your podiatrist for Friday      -Watch for infection (as we reviewed today).         This progress note has been dictated, with use of voice recognition software. Any grammatical, typographical, or context errors are unintentional and inherent to use of voice recognition software.  ---------------      Aida Alberts is a 78-year-old female who presents to urgent care today for evaluation of a corn/callus on the top of her left fifth toe.  Patient states she has been trying to get rid of it with over-the-counter corn pads for the past 2 to 3 weeks but has been unsuccessful.  She does reportedly have  a podiatry appointment on Friday, but decided to stop in to urgent care today to see if there is a treatment that might be started now.    No other acute medical concerns.     Past Medical History:   Diagnosis Date    BRCA2 positive 01/22/2015    deletion of exons 5-7, identified using single site testing through [employer]    Breast cancer (H) 01/01/2002    Colon polyp     patient reports about 6 adenomas    Colonic adenoma     5-6 advanced adenomas in 2013 colonoscopy    Fibrocystic breast     Hx antineoplastic chemotherapy     Hx of radiation therapy     Hyperlipidemia     Malignant neoplasm of upper-outer quadrant of right breast in female, estrogen receptor negative (H) 2002    infiltrating ductal carcinoma w/DCIS, Stage IIA (T1c, N1, cM0, Free text: ER-ve,UT-ve,[id number] -ve)     Patient Active Problem List   Diagnosis    BRCA2 positive    Breast cancer, right (H)    Allergic rhinitis    Arthropathy    Benign neoplasm of descending colon    Bilateral primary  osteoarthritis of knee    Current smoker    Dyslipidemia    Gastroesophageal reflux disease    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    History of colonic polyps    Personal history of breast cancer    Hyperlipidemia    Insomnia    Malignant neoplasm of upper-outer quadrant of right breast in female, estrogen receptor negative (H)    Obesity (BMI 30-39.9)    Diverticular disease of large intestine    Polyp of colon    Morbid obesity (H)     Current Outpatient Medications   Medication Sig Dispense Refill    acetaminophen (TYLENOL) 325 MG tablet Take 325-650 mg by mouth as needed      albuterol (PROAIR HFA/PROVENTIL HFA/VENTOLIN HFA) 108 (90 Base) MCG/ACT inhaler Inhale 2 puffs into the lungs every 4 hours as needed      cetirizine (ZYRTEC) 10 MG CHEW 1 tablet Orally Once a day for 30 day(s)      chlorhexidine (PERIDEX) 0.12 % solution       Cholecalciferol (VITAMIN D3) 2000 UNITS CAPS Take 2,000 Units by mouth Occasionally      clindamycin (CLEOCIN) 300 MG capsule       escitalopram (LEXAPRO) 5 MG tablet Take 1 tablet by mouth daily at 2 pm      fluticasone-salmeterol (ADVAIR) 100-50 MCG/ACT inhaler Inhale 1 puff into the lungs 2 times daily      magnesium 250 MG tablet Take 1 tablet by mouth daily      metFORMIN (GLUCOPHAGE) 850 MG tablet 1 tablet with a meal Orally Once a day      metoprolol succinate ER (TOPROL XL) 25 MG 24 hr tablet Take 1 tablet (25 mg) by mouth daily 30 tablet 11    rosuvastatin (CRESTOR) 10 MG tablet Take 20 mg by mouth daily      apixaban ANTICOAGULANT (ELIQUIS ANTICOAGULANT) 5 MG tablet Take 1 tablet (5 mg) by mouth 2 times daily (Patient not taking: Reported on 7/16/2024) 60 tablet 3    hydrOXYzine HCl (ATARAX) 10 MG tablet Take 10-20 mg by mouth (Patient not taking: Reported on 7/16/2024)      triamcinolone (KENALOG) 0.1 % external cream PLEASE SEE ATTACHED FOR DETAILED DIRECTIONS (Patient not taking: Reported on 7/16/2024)       No current facility-administered medications for this visit.      Allergies   Allergen Reactions    Cefuroxime Hives    Amoxicillin-Pot Clavulanate Nausea and Vomiting       OBJECTIVE:     /71   Pulse 67   Temp 97.6  F (36.4  C) (Tympanic)   Resp 28   Wt 106.6 kg (235 lb)   LMP  (LMP Unknown)   SpO2 95%   BMI 39.71 kg/m      GENERAL:  Very pleasant, comfortable and generally well appearing.  LEFT FOOT: Callus/corn dorsal surface left fifth toe.  There is an area of central opening surrounded by some macerated appearing skin.  The remainder of the toe is uninvolved.  No plantar surface abnormalities.

## 2024-08-06 ENCOUNTER — TRANSCRIBE ORDERS (OUTPATIENT)
Dept: OTHER | Age: 79
End: 2024-08-06

## 2024-08-06 DIAGNOSIS — L50.8 CHRONIC URTICARIA: Primary | ICD-10-CM

## 2024-09-11 ENCOUNTER — TRANSFERRED RECORDS (OUTPATIENT)
Dept: HEALTH INFORMATION MANAGEMENT | Facility: CLINIC | Age: 79
End: 2024-09-11

## 2024-10-03 ENCOUNTER — TELEPHONE (OUTPATIENT)
Dept: CARDIOLOGY | Facility: CLINIC | Age: 79
End: 2024-10-03
Payer: COMMERCIAL

## 2024-10-03 NOTE — TELEPHONE ENCOUNTER
Left message for patient to return my call at their earliest convenience regarding PCP records.  Will attempt return call at a later time.      Edna SINCLAIR 10/03/24  2:52 PM

## 2024-10-09 ENCOUNTER — OFFICE VISIT (OUTPATIENT)
Dept: CARDIOLOGY | Facility: CLINIC | Age: 79
End: 2024-10-09
Payer: COMMERCIAL

## 2024-10-09 VITALS
DIASTOLIC BLOOD PRESSURE: 82 MMHG | OXYGEN SATURATION: 95 % | SYSTOLIC BLOOD PRESSURE: 115 MMHG | BODY MASS INDEX: 41 KG/M2 | HEART RATE: 70 BPM | WEIGHT: 242.6 LBS

## 2024-10-09 DIAGNOSIS — I48.0 PAF (PAROXYSMAL ATRIAL FIBRILLATION) (H): ICD-10-CM

## 2024-10-09 PROCEDURE — 99214 OFFICE O/P EST MOD 30 MIN: CPT | Performed by: INTERNAL MEDICINE

## 2024-10-09 RX ORDER — METOPROLOL SUCCINATE 25 MG/1
25 TABLET, EXTENDED RELEASE ORAL DAILY
Qty: 90 TABLET | Refills: 3 | Status: SHIPPED | OUTPATIENT
Start: 2024-10-09

## 2024-10-09 NOTE — PROGRESS NOTES
HPI and Plan:   Aida Alberts is a 79 year old female who presents with history of paroxysmal atrial fibrillation, obesity, hyperlipidemia.  She is here for an annual follow-up visit today.  Her Apple Watch broke and so she is no longer able to monitor her episodes of atrial fibrillation.  She does not feel palpitations.  Overall she has been doing well she is on an anti-inflammatory diet and exercising at least 2 to 3 days/week.  She states she is only lost 8 pounds which is a bit frustrating for her.  She also has had a prolonged upper respiratory infection for which she was taking steroids for a while.    On exam today her rhythm is regular suggesting a normal sinus rhythm I do not appreciate a murmur gallop or rub    Summary    1.  Paroxysmal atrial [id number] score is 3 and she is on Eliquis for CVA prophylaxis and low-dose metoprolol.  I did renew her prescriptions for her today  2.  Hyperlipidemia on 20 mg of rosuvastatin.  She is scheduled to see her PMD next month for lipid profile    3.  Obesity-discussed GLP agonists with her PMD and has opted not to try these.  She is on an anti-inflammatory diet with regular exercise, I recommend trying 14-hour intermittent fasting.  Lexapro may also be inhibiting her ability to lose weight, recommend discussing with her PMD whether she needs to continue this medication    I be happy to see her annually or as needed please feel free to contact me with any questions given regards to her         Today's clinic visit entailed:    30 minutes spent by me on the date of the encounter doing chart review, history and exam, documentation and further activities per the note  Provider  Link to [facility] Help Grid     The level of medical decision making during this visit was of moderate complexity.    No orders of the defined types were placed in this encounter.    Orders Placed This Encounter   Medications    metoprolol succinate ER (TOPROL XL) 25 MG 24 hr tablet      Sig: Take 1 tablet (25 mg) by mouth daily.     Dispense:  90 tablet     Refill:  3    apixaban ANTICOAGULANT (ELIQUIS ANTICOAGULANT) 5 MG tablet     Sig: Take 1 tablet (5 mg) by mouth 2 times daily.     Dispense:  180 tablet     Refill:  3     Medications Discontinued During This Encounter   Medication Reason    metoprolol succinate ER (TOPROL XL) 25 MG 24 hr tablet Reorder (No AVS)    apixaban ANTICOAGULANT (ELIQUIS ANTICOAGULANT) 5 MG tablet Reorder (No AVS)         Encounter Diagnosis   Name Primary?    PAF (paroxysmal atrial fibrillation) (H)        CURRENT MEDICATIONS:  Current Outpatient Medications   Medication Sig Dispense Refill    acetaminophen (TYLENOL) 325 MG tablet Take 325-650 mg by mouth as needed      albuterol (PROAIR HFA/PROVENTIL HFA/VENTOLIN HFA) 108 (90 Base) MCG/ACT inhaler Inhale 2 puffs into the lungs every 4 hours as needed      apixaban ANTICOAGULANT (ELIQUIS ANTICOAGULANT) 5 MG tablet Take 1 tablet (5 mg) by mouth 2 times daily. 180 tablet 3    cetirizine (ZYRTEC) 10 MG CHEW 1 tablet Orally Once a day for 30 day(s)      Cholecalciferol (VITAMIN D3) 2000 UNITS CAPS Take 2,000 Units by mouth Occasionally      escitalopram (LEXAPRO) 5 MG tablet Take 1 tablet by mouth daily at 2 pm      fluticasone-salmeterol (ADVAIR) 100-50 MCG/ACT inhaler Inhale 1 puff into the lungs 2 times daily      magnesium 250 MG tablet Take 1 tablet by mouth daily      metFORMIN (GLUCOPHAGE) 850 MG tablet 1 tablet with a meal Orally Once a day      metoprolol succinate ER (TOPROL XL) 25 MG 24 hr tablet Take 1 tablet (25 mg) by mouth daily. 90 tablet 3    rosuvastatin (CRESTOR) 10 MG tablet Take 20 mg by mouth daily      triamcinolone (KENALOG) 0.1 % external cream       chlorhexidine (PERIDEX) 0.12 % solution  (Patient not taking: Reported on 10/9/2024)      clindamycin (CLEOCIN) 300 MG capsule  (Patient not taking: Reported on 10/9/2024)      hydrOXYzine HCl (ATARAX) 10 MG tablet Take 10-20 mg by mouth (Patient  not taking: Reported on 2024)      salicylic acid (COMPOUND W MAX STRENGTH) 17 % external gel Apply topically daily One drop daily to corn until corn resolves (use only up to 14 days) (Patient not taking: Reported on 10/9/2024) 14 g 0       ALLERGIES     Allergies   Allergen Reactions    Cefuroxime Hives    Amoxicillin-Pot Clavulanate Nausea and Vomiting       PAST MEDICAL HISTORY:  Past Medical History:   Diagnosis Date    BRCA2 positive 2015    deletion of exons 5-7, identified using single site testing through [employer]    Breast cancer (H) 2002    Colon polyp     patient reports about 6 adenomas    Colonic adenoma     5-6 advanced adenomas in 2013 colonoscopy    Fibrocystic breast     Hx antineoplastic chemotherapy     Hx of radiation therapy     Hyperlipidemia     Malignant neoplasm of upper-outer quadrant of right breast in female, estrogen receptor negative (H)     infiltrating ductal carcinoma w/DCIS, Stage IIA (T1c, N1, cM0, Free text: ER-ve,IL-ve,Hij4cyj -ve)       PAST SURGICAL HISTORY:  Past Surgical History:   Procedure Laterality Date    BIOPSY BREAST  2018    at [facility]    COLONOSCOPY      EXCISE BREAST CYST/FIBROADENOMA/TUMOR/DUCT LESION/NIPPLE LESION/AREOLAR LESION      Description: Breast Surgery Lumpectomy;  Recorded: 2011;  Comments: Breast cancer, 2005    HYSTERECTOMY  2016    LUMPECTOMY BREAST      OOPHORECTOMY         FAMILY HISTORY:  Family History   Problem Relation Age of Onset    Heart Failure Mother          age 96    Heart Disease Father          in his 60s    Stomach Cancer Father         3/4 stomach removed at [place]; possibly 30s or 40s    Ovarian Cancer Sister 77        endometrioid, grade 2    Hereditary Breast and Ovarian Cancer Syndrome Sister         BRCA2 positive, del exons 5-7, [employer] [facility] lab    Melanoma Sister 29    Hereditary Breast and Ovarian Cancer Syndrome Sister     Hereditary Breast and Ovarian Cancer Syndrome  Sister     Heart Disease Sister         HOCM    Uterine Cancer Sister 76         age 76    Hereditary Breast and Ovarian Cancer Syndrome Brother     Leukemia Maternal Grandmother         chronic typle    Lung Cancer Paternal Uncle 60    Breast Cancer Niece 40        3 occurrences    Hereditary Breast and Ovarian Cancer Syndrome Niece         no cancer       SOCIAL HISTORY:  Social History     Socioeconomic History    Marital status:      Spouse name: NA    Number of children: 2    Years of education: None    Highest education level: None   Occupational History    Occupation: Retired     Comment: [employer] [Other]   Tobacco Use    Smoking status: Former     Current packs/day: 0.00     Average packs/day: 0.5 packs/day for 44.0 years (22.0 ttl pk-yrs)     Types: Cigarettes     Start date: 1/3/1965     Quit date: 2008     Years since quitting: 15.7    Smokeless tobacco: Never    Tobacco comments:     1 pack a week until    Substance and Sexual Activity    Alcohol use: Yes     Alcohol/week: 7.0 standard drinks of alcohol    Drug use: No    Sexual activity: Never   Social History Narrative    She previously owned a marketing company. She has 2 adopted children and 2 grandchildren and was  in  after 44 years of marriage (he is bipolar).  She lives alone in an apartment.     Social Determinants of Health     Interpersonal Safety: Not At Risk (2021)    Humiliation, Afraid, Rape, and Kick questionnaire     Fear of Current or Ex-Partner: No     Emotionally Abused: No     Physically Abused: No     Sexually Abused: No       Review of Systems:  Skin:        Eyes:       ENT:  Positive for sinus trouble  Respiratory:  Positive for shortness of breath  Cardiovascular:  Negative;palpitations;chest pain;syncope or near-syncope;cyanosis;lightheadedness;dizziness;fatigue;exercise intolerance edema;Positive for  Gastroenterology:      Genitourinary:       Musculoskeletal:        Neurologic:       Psychiatric:       Heme/Lymph/Imm:       Endocrine:  Negative      Physical Exam:  Vitals: /82   Pulse 70   Wt 110 kg (242 lb 9.6 oz)   LMP  (LMP Unknown)   SpO2 95%   BMI 41.00 kg/m      Constitutional:  cooperative;in no acute distress obese      Skin:  warm and dry to the touch          Head:  normocephalic        Eyes:  pupils equal and round        Lymph:      ENT:  no pallor or cyanosis        Neck:  no carotid bruit        Respiratory:  normal symmetry;clear to auscultation         Cardiac: regular rhythm;no murmurs, gallops or rubs detected                pulses full and equal                                        GI:  abdomen soft obese      Extremities and Muscular Skeletal:  no edema;no deformities, clubbing, cyanosis, erythema observed              Neurological:  no gross motor deficits;affect appropriate        Psych:  Alert and Oriented x 3        Recent Lab Results:  LIPID RESULTS:  Lab Results   Component Value Date    CHOL 250 (H) 03/01/2018    HDL 51 03/01/2018     (H) 03/01/2018    TRIG 197 (H) 03/01/2018       LIVER ENZYME RESULTS:  Lab Results   Component Value Date    AST 28 03/01/2018    ALT 43 03/01/2018       CBC RESULTS:  Lab Results   Component Value Date    WBC 8.6 01/22/2023    RBC 5.20 01/22/2023    HGB 16.1 (H) 01/22/2023    HCT 49.3 (H) 01/22/2023    MCV 95 01/22/2023    MCH 31.0 01/22/2023    MCHC 32.7 01/22/2023    RDW 13.7 01/22/2023     01/22/2023       BMP RESULTS:  Lab Results   Component Value Date     01/22/2023    POTASSIUM 4.2 01/22/2023    POTASSIUM 4.9 06/08/2018    CHLORIDE 101 01/22/2023    CHLORIDE 101 06/08/2018    CO2 26 01/22/2023    CO2 27 06/08/2018    ANIONGAP 13 01/22/2023    ANIONGAP 12 06/08/2018    GLC 86 01/22/2023    GLC 74 06/08/2018    BUN 19.6 01/22/2023    BUN 16 06/08/2018    CR 0.54 01/22/2023    GFRESTIMATED >90 01/22/2023    GFRESTIMATED >90 06/18/2020    GFRESTBLACK >90 06/18/2020    MATHEUS 9.5  "01/22/2023        A1C RESULTS:  No results found for: \"A1C\"    INR RESULTS:  No results found for: \"INR\"        CC  Praveena Avendano,   [address]  [place] [id number]                "

## 2024-10-09 NOTE — LETTER
10/9/2024    JACKSON CHAVEZ CNP  [facility] [address] [place] [address]    RE: Aida Alberts       Dear Colleague,     I [facility] the [facility].  HPI and Plan:   Aida Alberts is a 79 year old female who presents with history of paroxysmal atrial fibrillation, obesity, hyperlipidemia.  She is here for an annual follow-up visit today.  Her Apple Watch broke and so she is no longer able to monitor her episodes of atrial fibrillation.  She does not feel palpitations.  Overall she has been doing well she is on an anti-inflammatory diet and exercising at least 2 to 3 days/week.  She states she is only lost 8 pounds which is a bit frustrating for her.  She also has had a prolonged upper respiratory infection for which she was taking steroids for a while.    On exam today her rhythm is regular suggesting a normal sinus rhythm I do not appreciate a murmur gallop or rub    Summary    1.  Paroxysmal atrial [id number]-[id number] score is 3 and she is on Eliquis for CVA prophylaxis and low-dose metoprolol.  I did renew her prescriptions for her today  2.  Hyperlipidemia on 20 mg of rosuvastatin.  She is scheduled to see her PMD next month for lipid profile    3.  Obesity-discussed GLP agonists with her PMD and has opted not to try these.  She is on an anti-inflammatory diet with regular exercise, I recommend trying 14-hour intermittent fasting.  Lexapro may also be inhibiting her ability to lose weight, recommend discussing with her PMD whether she needs to continue this medication    I be happy to see her annually or as needed please feel free to contact me with any questions given regards to her         Today's clinic visit entailed:    30 minutes spent by me on the date of the encounter doing chart review, history and exam, documentation and further activities per the note  Provider  Link to [facility] Help Grid     The level of  medical decision making during this visit was of moderate complexity.    No orders of the defined types were placed in this encounter.    Orders Placed This Encounter   Medications     metoprolol succinate ER (TOPROL XL) 25 MG 24 hr tablet     Sig: Take 1 tablet (25 mg) by mouth daily.     Dispense:  90 tablet     Refill:  3     apixaban ANTICOAGULANT (ELIQUIS ANTICOAGULANT) 5 MG tablet     Sig: Take 1 tablet (5 mg) by mouth 2 times daily.     Dispense:  180 tablet     Refill:  3     Medications Discontinued During This Encounter   Medication Reason     metoprolol succinate ER (TOPROL XL) 25 MG 24 hr tablet Reorder (No AVS)     apixaban ANTICOAGULANT (ELIQUIS ANTICOAGULANT) 5 MG tablet Reorder (No AVS)         Encounter Diagnosis   Name Primary?     PAF (paroxysmal atrial fibrillation) (H)        CURRENT MEDICATIONS:  Current Outpatient Medications   Medication Sig Dispense Refill     acetaminophen (TYLENOL) 325 MG tablet Take 325-650 mg by mouth as needed       albuterol (PROAIR HFA/PROVENTIL HFA/VENTOLIN HFA) 108 (90 Base) MCG/ACT inhaler Inhale 2 puffs into the lungs every 4 hours as needed       apixaban ANTICOAGULANT (ELIQUIS ANTICOAGULANT) 5 MG tablet Take 1 tablet (5 mg) by mouth 2 times daily. 180 tablet 3     cetirizine (ZYRTEC) 10 MG CHEW 1 tablet Orally Once a day for 30 day(s)       Cholecalciferol (VITAMIN D3) 2000 UNITS CAPS Take 2,000 Units by mouth Occasionally       escitalopram (LEXAPRO) 5 MG tablet Take 1 tablet by mouth daily at 2 pm       fluticasone-salmeterol (ADVAIR) 100-50 MCG/ACT inhaler Inhale 1 puff into the lungs 2 times daily       magnesium 250 MG tablet Take 1 tablet by mouth daily       metFORMIN (GLUCOPHAGE) 850 MG tablet 1 tablet with a meal Orally Once a day       metoprolol succinate ER (TOPROL XL) 25 MG 24 hr tablet Take 1 tablet (25 mg) by mouth daily. 90 tablet 3     rosuvastatin (CRESTOR) 10 MG tablet Take 20 mg by mouth daily       triamcinolone (KENALOG) 0.1 % external  cream        chlorhexidine (PERIDEX) 0.12 % solution  (Patient not taking: Reported on 10/9/2024)       clindamycin (CLEOCIN) 300 MG capsule  (Patient not taking: Reported on 10/9/2024)       hydrOXYzine HCl (ATARAX) 10 MG tablet Take 10-20 mg by mouth (Patient not taking: Reported on 2024)       salicylic acid (COMPOUND W MAX STRENGTH) 17 % external gel Apply topically daily One drop daily to corn until corn resolves (use only up to 14 days) (Patient not taking: Reported on 10/9/2024) 14 g 0       ALLERGIES     Allergies   Allergen Reactions     Cefuroxime Hives     Amoxicillin-Pot Clavulanate Nausea and Vomiting       PAST MEDICAL HISTORY:  Past Medical History:   Diagnosis Date     BRCA2 positive 2015    deletion of exons 5-7, identified using single site testing through [employer]     Breast cancer (H) 2002     Colon polyp     patient reports about 6 adenomas     Colonic adenoma     5-6 advanced adenomas in 2013 colonoscopy     Fibrocystic breast      Hx antineoplastic chemotherapy      Hx of radiation therapy      Hyperlipidemia      Malignant neoplasm of upper-outer quadrant of right breast in female, estrogen receptor negative (H)     infiltrating ductal carcinoma w/DCIS, Stage IIA (T1c, N1, cM0, Free text: ER-ve,AK-ve,Xte3oiz -ve)       PAST SURGICAL HISTORY:  Past Surgical History:   Procedure Laterality Date     BIOPSY BREAST  2018    at [facility]     COLONOSCOPY       EXCISE BREAST CYST/FIBROADENOMA/TUMOR/DUCT LESION/NIPPLE LESION/AREOLAR LESION      Description: Breast Surgery Lumpectomy;  Recorded: 2011;  Comments: Breast cancer, 2005     HYSTERECTOMY  2016     LUMPECTOMY BREAST       OOPHORECTOMY         FAMILY HISTORY:  Family History   Problem Relation Age of Onset     Heart Failure Mother          age 96     Heart Disease Father          in his 60s     Stomach Cancer Father         3/4 stomach removed at [place]; possibly 30s or 40s     Ovarian  Cancer Sister 77        endometrioid, grade 2     Hereditary Breast and Ovarian Cancer Syndrome Sister         BRCA2 positive, del exons 5-7, [facility]     Melanoma Sister 29     Hereditary Breast and Ovarian Cancer Syndrome Sister      Hereditary Breast and Ovarian Cancer Syndrome Sister      Heart Disease Sister         HOCM     Uterine Cancer Sister 76         age 76     Hereditary Breast and Ovarian Cancer Syndrome Brother      Leukemia Maternal Grandmother         chronic typle     Lung Cancer Paternal Uncle 60     Breast Cancer Niece 40        3 occurrences     Hereditary Breast and Ovarian Cancer Syndrome Niece         no cancer       SOCIAL HISTORY:  Social History     Socioeconomic History     Marital status:      Spouse name: NA     Number of children: 2     Years of education: None     Highest education level: None   Occupational History     Occupation: Retired     Comment: [employer] [Other]   Tobacco Use     Smoking status: Former     Current packs/day: 0.00     Average packs/day: 0.5 packs/day for 44.0 years (22.0 ttl pk-yrs)     Types: Cigarettes     Start date: 1/3/1965     Quit date: 2008     Years since quitting: 15.7     Smokeless tobacco: Never     Tobacco comments:     1 pack a week until    Substance and Sexual Activity     Alcohol use: Yes     Alcohol/week: 7.0 standard drinks of alcohol     Drug use: No     Sexual activity: Never   Social History Narrative    She previously owned a marketing company. She has 2 adopted children and 2 grandchildren and was  in 2014 after 44 years of marriage (he is bipolar).  She lives alone in an apartment.     Social Determinants of Health     Interpersonal Safety: Not At Risk (2021)    Humiliation, Afraid, Rape, and Kick questionnaire      Fear of Current or Ex-Partner: No      Emotionally Abused: No      Physically Abused: No      Sexually Abused: No       Review of Systems:  Skin:        Eyes:        ENT:  Positive for sinus trouble  Respiratory:  Positive for shortness of breath  Cardiovascular:  Negative;palpitations;chest pain;syncope or near-syncope;cyanosis;lightheadedness;dizziness;fatigue;exercise intolerance edema;Positive for  Gastroenterology:      Genitourinary:       Musculoskeletal:       Neurologic:       Psychiatric:       Heme/Lymph/Imm:       Endocrine:  Negative      Physical Exam:  Vitals: /82   Pulse 70   Wt 110 kg (242 lb 9.6 oz)   LMP  (LMP Unknown)   SpO2 95%   BMI 41.00 kg/m      Constitutional:  cooperative;in no acute distress obese      Skin:  warm and dry to the touch          Head:  normocephalic        Eyes:  pupils equal and round        Lymph:      ENT:  no pallor or cyanosis        Neck:  no carotid bruit        Respiratory:  normal symmetry;clear to auscultation         Cardiac: regular rhythm;no murmurs, gallops or rubs detected                pulses full and equal                                        GI:  abdomen soft obese      Extremities and Muscular Skeletal:  no edema;no deformities, clubbing, cyanosis, erythema observed              Neurological:  no gross motor deficits;affect appropriate        Psych:  Alert and Oriented x 3        Recent Lab Results:  LIPID RESULTS:  Lab Results   Component Value Date    CHOL 250 (H) 03/01/2018    HDL 51 03/01/2018     (H) 03/01/2018    TRIG 197 (H) 03/01/2018       LIVER ENZYME RESULTS:  Lab Results   Component Value Date    AST 28 03/01/2018    ALT 43 03/01/2018       CBC RESULTS:  Lab Results   Component Value Date    WBC 8.6 01/22/2023    RBC 5.20 01/22/2023    HGB 16.1 (H) 01/22/2023    HCT 49.3 (H) 01/22/2023    MCV 95 01/22/2023    MCH 31.0 01/22/2023    MCHC 32.7 01/22/2023    RDW 13.7 01/22/2023     01/22/2023       BMP RESULTS:  Lab Results   Component Value Date     01/22/2023    POTASSIUM 4.2 01/22/2023    POTASSIUM 4.9 06/08/2018    CHLORIDE 101 01/22/2023    CHLORIDE 101 06/08/2018     "CO2 26 01/22/2023    CO2 27 06/08/2018    ANIONGAP 13 01/22/2023    ANIONGAP 12 06/08/2018    GLC 86 01/22/2023    GLC 74 06/08/2018    BUN 19.6 01/22/2023    BUN 16 06/08/2018    CR 0.54 01/22/2023    GFRESTIMATED >90 01/22/2023    GFRESTIMATED >90 06/18/2020    GFRESTBLACK >90 06/18/2020    MATHEUS 9.5 01/22/2023        A1C RESULTS:  No results found for: \"A1C\"    INR RESULTS:  No results found for: \"INR\"        CC  Praveena Avendano DO  [address]  [place] [id number]                  Thank you for allowing me to participate in the care of your patient.      Sincerely,     Praveena Avendano DO     [facility]  cc:   Praveena Avendano DO  [address]  [place] [id number]      "

## 2024-10-20 ENCOUNTER — HEALTH MAINTENANCE LETTER (OUTPATIENT)
Age: 79
End: 2024-10-20

## 2024-12-12 ENCOUNTER — LAB (OUTPATIENT)
Dept: LAB | Facility: CLINIC | Age: 79
End: 2024-12-12
Payer: COMMERCIAL

## 2024-12-12 ENCOUNTER — OFFICE VISIT (OUTPATIENT)
Dept: ALLERGY | Facility: CLINIC | Age: 79
End: 2024-12-12
Payer: COMMERCIAL

## 2024-12-12 VITALS
DIASTOLIC BLOOD PRESSURE: 63 MMHG | OXYGEN SATURATION: 95 % | HEART RATE: 79 BPM | WEIGHT: 242.51 LBS | SYSTOLIC BLOOD PRESSURE: 97 MMHG | BODY MASS INDEX: 40.98 KG/M2

## 2024-12-12 DIAGNOSIS — L50.8 CHRONIC URTICARIA: ICD-10-CM

## 2024-12-12 DIAGNOSIS — R09.82 PND (POST-NASAL DRIP): ICD-10-CM

## 2024-12-12 DIAGNOSIS — T78.40XA ALLERGY, UNSPECIFIED, INITIAL ENCOUNTER: ICD-10-CM

## 2024-12-12 DIAGNOSIS — R06.7 SNEEZING: ICD-10-CM

## 2024-12-12 DIAGNOSIS — R06.7 SNEEZING: Primary | ICD-10-CM

## 2024-12-12 LAB
ALBUMIN SERPL BCG-MCNC: 4.4 G/DL (ref 3.5–5.2)
ALP SERPL-CCNC: 81 U/L (ref 40–150)
ALT SERPL W P-5'-P-CCNC: 26 U/L (ref 0–50)
ANION GAP SERPL CALCULATED.3IONS-SCNC: 10 MMOL/L (ref 7–15)
AST SERPL W P-5'-P-CCNC: 25 U/L (ref 0–45)
BASOPHILS # BLD AUTO: 0 10E3/UL (ref 0–0.2)
BASOPHILS NFR BLD AUTO: 0 %
BILIRUB SERPL-MCNC: 0.4 MG/DL
BUN SERPL-MCNC: 12.1 MG/DL (ref 8–23)
CALCIUM SERPL-MCNC: 9.6 MG/DL (ref 8.8–10.4)
CHLORIDE SERPL-SCNC: 100 MMOL/L (ref 98–107)
CREAT SERPL-MCNC: 0.67 MG/DL (ref 0.51–0.95)
EGFRCR SERPLBLD CKD-EPI 2021: 88 ML/MIN/1.73M2
EOSINOPHIL # BLD AUTO: 0.2 10E3/UL (ref 0–0.7)
EOSINOPHIL NFR BLD AUTO: 2 %
ERYTHROCYTE [DISTWIDTH] IN BLOOD BY AUTOMATED COUNT: 12.8 % (ref 10–15)
GLUCOSE SERPL-MCNC: 88 MG/DL (ref 70–99)
HCO3 SERPL-SCNC: 28 MMOL/L (ref 22–29)
HCT VFR BLD AUTO: 45.5 % (ref 35–47)
HGB BLD-MCNC: 14.7 G/DL (ref 11.7–15.7)
IMM GRANULOCYTES # BLD: 0 10E3/UL
IMM GRANULOCYTES NFR BLD: 0 %
LYMPHOCYTES # BLD AUTO: 1.8 10E3/UL (ref 0.8–5.3)
LYMPHOCYTES NFR BLD AUTO: 26 %
MCH RBC QN AUTO: 30.9 PG (ref 26.5–33)
MCHC RBC AUTO-ENTMCNC: 32.3 G/DL (ref 31.5–36.5)
MCV RBC AUTO: 96 FL (ref 78–100)
MONOCYTES # BLD AUTO: 0.6 10E3/UL (ref 0–1.3)
MONOCYTES NFR BLD AUTO: 9 %
NEUTROPHILS # BLD AUTO: 4.2 10E3/UL (ref 1.6–8.3)
NEUTROPHILS NFR BLD AUTO: 62 %
PLATELET # BLD AUTO: 212 10E3/UL (ref 150–450)
POTASSIUM SERPL-SCNC: 4.5 MMOL/L (ref 3.4–5.3)
PROT SERPL-MCNC: 7.2 G/DL (ref 6.4–8.3)
RBC # BLD AUTO: 4.75 10E6/UL (ref 3.8–5.2)
SODIUM SERPL-SCNC: 138 MMOL/L (ref 135–145)
WBC # BLD AUTO: 6.8 10E3/UL (ref 4–11)

## 2024-12-12 NOTE — PROGRESS NOTES
Aida Alberts was seen in the Allergy Clinic at [facility].    Aida Alberts is a 79 year old female being seen today at the request of Magui Norris MD/[facility] in consultation for  Chronic urticaria.    She has seasonal allergic rhinitis concerns with sneezing as well as nasal congestion and significant postnasal drainage.    She also has hives and has seen 2 different dermatologist.  She has been taking 4 Zyrtec a day and stopped 7 days ago.  The Zyrtec are causing her significant fatigue.  However it is helping with her hives.  She has photographs on her phone that are consistent with hives that with raised red linear lines.    She also has Advair and albuterol and states she does not have asthma.    She been using Flonase and Radha pot's for the last 6 to 12 months with good relief of her symptoms.      Past Medical History:   Diagnosis Date    BRCA2 positive 2015    deletion of exons 5-7, identified using single site testing through [facility]    Breast cancer (H) 2002    Colon polyp     patient reports about 6 adenomas    Colonic adenoma     5-6 advanced adenomas in 2013 colonoscopy    Fibrocystic breast     Hx antineoplastic chemotherapy     Hx of radiation therapy     Hyperlipidemia     Malignant neoplasm of upper-outer quadrant of right breast in female, estrogen receptor negative (H)     infiltrating ductal carcinoma w/DCIS, Stage IIA (T1c, N1, cM0, Free text: ER-ve,VT-ve,[id number] -ve)     Family History   Problem Relation Age of Onset    Heart Failure Mother          age 96    Heart Disease Father          in his 60s    Stomach Cancer Father         3/4 stomach removed at [employer]; possibly 30s or 40s    Ovarian Cancer Sister 77        endometrioid, grade 2    Hereditary Breast and Ovarian Cancer Syndrome Sister         BRCA2 positive, del exons 5-7, [facility] lab    Melanoma Sister 29    Hereditary Breast and Ovarian Cancer  Syndrome Sister     Hereditary Breast and Ovarian Cancer Syndrome Sister     Heart Disease Sister         HOCM    Uterine Cancer Sister 76         age 76    Hereditary Breast and Ovarian Cancer Syndrome Brother     Leukemia Maternal Grandmother         chronic typle    Lung Cancer Paternal Uncle 60    Breast Cancer Niece 40        3 occurrences    Hereditary Breast and Ovarian Cancer Syndrome Niece         no cancer     Past Surgical History:   Procedure Laterality Date    BIOPSY BREAST  2018    at [facility]    COLONOSCOPY      EXCISE BREAST CYST/FIBROADENOMA/TUMOR/DUCT LESION/NIPPLE LESION/AREOLAR LESION      Description: Breast Surgery Lumpectomy;  Recorded: 2011;  Comments: Breast cancer, 2005    HYSTERECTOMY  2016    LUMPECTOMY BREAST      OOPHORECTOMY         ENVIRONMENTAL HISTORY:   Pets inside the house include None.  Do you smoke cigarettes or other recreational drugs? No There is/are 0 smokers living in the house. The house does not have a basement.     SOCIAL HISTORY:   Aida is retired. She lives with her self.      Review of Systems      Current Outpatient Medications:     Cholecalciferol (VITAMIN D3) 2000 UNITS CAPS, Take 2,000 Units by mouth Occasionally, Disp: , Rfl:     escitalopram (LEXAPRO) 5 MG tablet, Take 1 tablet by mouth daily at 2 pm, Disp: , Rfl:     fluticasone-salmeterol (ADVAIR) 100-50 MCG/ACT inhaler, Inhale 1 puff into the lungs 2 times daily, Disp: , Rfl:     magnesium 250 MG tablet, Take 1 tablet by mouth daily, Disp: , Rfl:     metFORMIN (GLUCOPHAGE) 850 MG tablet, 1 tablet with a meal Orally Once a day, Disp: , Rfl:     metoprolol succinate ER (TOPROL XL) 25 MG 24 hr tablet, Take 1 tablet (25 mg) by mouth daily., Disp: 90 tablet, Rfl: 3    rosuvastatin (CRESTOR) 10 MG tablet, Take 20 mg by mouth daily, Disp: , Rfl:     triamcinolone (KENALOG) 0.1 % external cream, , Disp: , Rfl:     acetaminophen (TYLENOL) 325 MG tablet, Take 325-650 mg by mouth as needed,  Disp: , Rfl:     albuterol (PROAIR HFA/PROVENTIL HFA/VENTOLIN HFA) 108 (90 Base) MCG/ACT inhaler, Inhale 2 puffs into the lungs every 4 hours as needed, Disp: , Rfl:     apixaban ANTICOAGULANT (ELIQUIS ANTICOAGULANT) 5 MG tablet, Take 1 tablet (5 mg) by mouth 2 times daily., Disp: 180 tablet, Rfl: 3    cetirizine (ZYRTEC) 10 MG CHEW, 1 tablet Orally Once a day for 30 day(s), Disp: , Rfl:     hydrOXYzine HCl (ATARAX) 10 MG tablet, Take 10-20 mg by mouth (Patient not taking: Reported on 12/12/2024), Disp: , Rfl:   Allergies   Allergen Reactions    Cefuroxime Hives    Amoxicillin-Pot Clavulanate Nausea and Vomiting         EXAM:   BP 97/63   Pulse 79   Wt 110 kg (242 lb 8.1 oz)   LMP  (LMP Unknown)   SpO2 95%   BMI 40.98 kg/m      Physical Exam    Constitutional:       General: She is not in acute distress.     Appearance: Normal appearance. She is not ill-appearing.   HENT:      Head: Normocephalic and atraumatic.      Nose: Nose normal. No congestion or rhinorrhea.      Mouth/Throat:      Mouth: Mucous membranes are moist.      Pharynx: Oropharynx is clear. No posterior oropharyngeal erythema.   Eyes:      General:         Right eye: No discharge.         Left eye: No discharge.   Cardiovascular:      Rate and Rhythm: Normal rate and regular rhythm.      Heart sounds: Normal heart sounds.   Pulmonary:      Effort: Pulmonary effort is normal.      Breath sounds: Normal breath sounds. No wheezing or rhonchi.   Skin:     General: Skin is warm.      Findings: No erythema or rash.   Neurological:      General: No focal deficit present.      Mental Status: She is alert. Mental status is at baseline.   Psychiatric:         Mood and Affect: Mood normal.         Behavior: Behavior normal.        WORKUP: Skin testing was negative    NVIRONMENTAL PERCUTANEOUS SKIN TESTING: ADULT      12/12/2024     1:00 PM   [facility]   Consent Y   Ordering Physician Dr. Patel   Interpreting Physician Dr. Patel   Testing  Technician Anna BRAN RN   Location Back   Time start: 13:51   Time End: 14:06   Positive Control: Histatrol*ALK 1 mg/ml 4/5   Negative Control: 50% Glycerin 0   Cat Hair*ALK (10,000 BAU/ml) 0   AP Dog Hair/Dander (1:100 w/v) 0   Dust Mite p. 30,000 AU/ml 0   Dust Mite f. (30,000 AU/ml) 0   Darrin (W/F in millimeters) 0   Anatoly Grass (100,000 BAU/mL) 0   Red Cedar (W/F in millimeters) 0   Maple/Cooper (W/F in millimeters) 0   Hackberry (W/F in millimeters) 0   Pembroke (W/F in millimeters) 0   [place] *ALK (W/F in millimeters) 0   American Elm (W/F in millimeters) 0   Billings (W/F in millimeters) 0   Black Pomerene (W/F in millimeters) 0   Birch Mix (W/F in millimeters) 0   Belle Mead (W/F in millimeters) 0   Oak (W/F in millimeters) 0   Cocklebur (W/F in millimeters) 0   Sublimity (W/F in millimeters) 0   White Leandro (W/F in millimeters) 0   Careless (W/F in millimeters) 0   Nettle (W/F in millimeters) 0   English Plantain (W/F in millimeters) 0   Kochia (W/F in millimeters) 0   Lamb's Quarter (W/F in millimeters) 0   Marshelder (W/F in millimeters) 0   Ragweed Mix* ALK (W/F in millimeters) 0   Russian Thistle (W/F in millimeters) 0   Sagebrush/Mugwort (W/F in millimeters) 0   Sheep Sorrel (W/F in millimeters) 0   Feather Mix* ALK (W/F in millimeters) 0   Penicillium Mix (1:10 w/v) 0   Curvularia spicifera (1:10 w/v) 0   Epicoccum (1:10 w/v) 0   Aspergillus fumigatus (1:10 w/v): 0   Alternaria tenius (1:10 w/v) 0   H. Cladosporium (1:10 w/v) 0   Phoma herbarum (1:10 w/v) 0       ASSESSMENT/PLAN:  Aida Alberts is a 79 year old female seen today for possible environmental allergies with negative skin testing.  Will check blood test.  Also has hives.  Will change medications due to significant fatigue with the for Zyrtec that she is taking.  Also check labs due to the persistent hives.    Take 180 mg Allegra (fexofenadine) in AM, then take Zyrtec 10 mg at night.  May increase to 20 mg at night if  necessary.  Generic is fine for both  Labs  Flonase and netipot to continue    Follow-up in 1 month      Thank you for allowing me to participate in the care of Aida Alberts.      I spent 35 minutes on the date of the encounter doing chart review, history and exam, documentation and further coordination as noted above exclusive of separately reported interpretations    Rogers Patel MD  Allergy/Immunology  [facility]

## 2024-12-12 NOTE — PROGRESS NOTES
Per provider verbal order, placed Adult Environmental Panel scratch test.  Verbal consent was obtained by MD prior to procedure.  Once panels were placed, patient was monitored for 15 minutes in clinic.  Provider read test after 15 minutes.  Pt tolerated procedure well.  All questions and concerns were addressed at office visit.     Anna Burkett RN

## 2024-12-12 NOTE — PATIENT INSTRUCTIONS
Take 180 mg Allegra (fexofenadine) in AM, then take Zyrtec 10 mg at night.  May increase to 20 mg at night if necessary.  Generic is fine for both  Labs  Flonase and netipot to continue            Allergy Staff Appt Hours Shot Hours Location       Physician   Rogers Patel MD      Support Staff   JARRED Carias RN, NICOL LO MA      Mondays Tuesdays Thursdays and Fridays:      [place] 7-5      Wednesdays         Close                Mondays, Tuesdays and Fridays:  7:20 - 3:40              [facility]  [address]  [place] [address]  Allergy appointment  line: [phone]    Pulmonary Function Scheduling:  [place]: [phone]           Questions about cost of your care  For questions about your cost of your visit, procedure, lab or imaging contact: [facility] Price Line [phone] or visit:  [email or web address]    Prescription Assistance  If you need assistance with your prescriptions (cost, coverage, etc) please contact: [facility] Prescription Assistance Program [phone]    Important Scheduling Information  All visits for food challenges, medication/drug allergy testing, and drug challenges MUST be scheduled through the allergy clinic nurse. Please contact them via [employer] or by calling the clinic at [phone] and asking to speak with an allergy nurse. They will provide additional information and instructions for the appointment. Discontinue oral antihistamines 7 days prior to the appointment. Discontinue nasal and ocular antihistamines 1 day prior to the appointment.    Appointments for skin testing: Appointment will last approximately 45 minutes.  Please call the appointment line for your clinic to schedule.  Discontinue oral antihistamines 7 days prior to the appointment.  Discontinue nasal and ocular antihistamines 1 days prior to appointment.    Thank you for trusting us with your care.  Please feel free to contact us with any questions or concerns you may have.

## 2024-12-14 LAB
A ALTERNATA IGE QN: <0.1 KU(A)/L
COMMON RAGWEED IGE QN: <0.1 KU(A)/L
D FARINAE IGE QN: <0.1 KU(A)/L
D PTERONYSS IGE QN: <0.1 KU(A)/L
GIANT RAGWEED IGE QN: <0.1 KU(A)/L
MAPLE IGE QN: <0.1 KU(A)/L
SILVER BIRCH IGE QN: <0.1 KU(A)/L
WHITE OAK IGE QN: <0.1 KU(A)/L

## 2025-03-17 ENCOUNTER — TELEPHONE (OUTPATIENT)
Dept: ONCOLOGY | Facility: HOSPITAL | Age: 80
End: 2025-03-17
Payer: COMMERCIAL

## 2025-03-24 ENCOUNTER — TELEPHONE (OUTPATIENT)
Dept: ONCOLOGY | Facility: HOSPITAL | Age: 80
End: 2025-03-24
Payer: COMMERCIAL

## 2025-06-15 ENCOUNTER — HEALTH MAINTENANCE LETTER (OUTPATIENT)
Age: 80
End: 2025-06-15

## 2025-08-19 ENCOUNTER — TELEPHONE (OUTPATIENT)
Dept: ONCOLOGY | Facility: HOSPITAL | Age: 80
End: 2025-08-19
Payer: COMMERCIAL

## (undated) RX ORDER — REGADENOSON 0.08 MG/ML
INJECTION, SOLUTION INTRAVENOUS
Status: DISPENSED
Start: 2023-04-06